# Patient Record
Sex: MALE | Race: WHITE | Employment: OTHER | URBAN - NONMETROPOLITAN AREA
[De-identification: names, ages, dates, MRNs, and addresses within clinical notes are randomized per-mention and may not be internally consistent; named-entity substitution may affect disease eponyms.]

---

## 2017-01-12 ENCOUNTER — OFFICE VISIT (OUTPATIENT)
Dept: UROLOGY | Age: 77
End: 2017-01-12

## 2017-01-12 VITALS
DIASTOLIC BLOOD PRESSURE: 80 MMHG | WEIGHT: 204 LBS | BODY MASS INDEX: 27.04 KG/M2 | HEIGHT: 73 IN | SYSTOLIC BLOOD PRESSURE: 130 MMHG

## 2017-01-12 DIAGNOSIS — N28.1 RENAL CYST, LEFT: ICD-10-CM

## 2017-01-12 DIAGNOSIS — C61 PROSTATE CANCER (HCC): Primary | ICD-10-CM

## 2017-01-12 DIAGNOSIS — R31.29 MICROSCOPIC HEMATURIA: ICD-10-CM

## 2017-01-12 LAB
BILIRUBIN URINE: NEGATIVE
BLOOD URINE, POC: NORMAL
CHARACTER, URINE: CLEAR
COLOR, URINE: YELLOW
GLUCOSE URINE: NEGATIVE MG/DL
KETONES, URINE: NEGATIVE
LEUKOCYTE CLUMPS, URINE: NEGATIVE
NITRITE, URINE: NEGATIVE
PH, URINE: 5.5
PROTEIN, URINE: NEGATIVE MG/DL
SPECIFIC GRAVITY, URINE: <= 1.005 (ref 1–1.03)
UROBILINOGEN, URINE: 0.2 EU/DL

## 2017-01-12 PROCEDURE — G8420 CALC BMI NORM PARAMETERS: HCPCS | Performed by: PHYSICIAN ASSISTANT

## 2017-01-12 PROCEDURE — 4040F PNEUMOC VAC/ADMIN/RCVD: CPT | Performed by: PHYSICIAN ASSISTANT

## 2017-01-12 PROCEDURE — 1123F ACP DISCUSS/DSCN MKR DOCD: CPT | Performed by: PHYSICIAN ASSISTANT

## 2017-01-12 PROCEDURE — 3017F COLORECTAL CA SCREEN DOC REV: CPT | Performed by: PHYSICIAN ASSISTANT

## 2017-01-12 PROCEDURE — 81003 URINALYSIS AUTO W/O SCOPE: CPT | Performed by: PHYSICIAN ASSISTANT

## 2017-01-12 PROCEDURE — G8427 DOCREV CUR MEDS BY ELIG CLIN: HCPCS | Performed by: PHYSICIAN ASSISTANT

## 2017-01-12 PROCEDURE — G8598 ASA/ANTIPLAT THER USED: HCPCS | Performed by: PHYSICIAN ASSISTANT

## 2017-01-12 PROCEDURE — 99213 OFFICE O/P EST LOW 20 MIN: CPT | Performed by: PHYSICIAN ASSISTANT

## 2017-01-12 PROCEDURE — G8484 FLU IMMUNIZE NO ADMIN: HCPCS | Performed by: PHYSICIAN ASSISTANT

## 2017-01-12 PROCEDURE — 1036F TOBACCO NON-USER: CPT | Performed by: PHYSICIAN ASSISTANT

## 2017-04-21 ENCOUNTER — OFFICE VISIT (OUTPATIENT)
Dept: CARDIOLOGY | Age: 77
End: 2017-04-21

## 2017-04-21 VITALS
BODY MASS INDEX: 30.22 KG/M2 | HEART RATE: 62 BPM | WEIGHT: 228 LBS | DIASTOLIC BLOOD PRESSURE: 70 MMHG | HEIGHT: 73 IN | SYSTOLIC BLOOD PRESSURE: 150 MMHG

## 2017-04-21 DIAGNOSIS — I25.10 CORONARY ARTERY DISEASE INVOLVING NATIVE HEART WITHOUT ANGINA PECTORIS, UNSPECIFIED VESSEL OR LESION TYPE: Primary | ICD-10-CM

## 2017-04-21 DIAGNOSIS — I10 ESSENTIAL HYPERTENSION: ICD-10-CM

## 2017-04-21 DIAGNOSIS — E78.01 FAMILIAL HYPERCHOLESTEROLEMIA: ICD-10-CM

## 2017-04-21 PROCEDURE — 1123F ACP DISCUSS/DSCN MKR DOCD: CPT | Performed by: NUCLEAR MEDICINE

## 2017-04-21 PROCEDURE — G8427 DOCREV CUR MEDS BY ELIG CLIN: HCPCS | Performed by: NUCLEAR MEDICINE

## 2017-04-21 PROCEDURE — 4040F PNEUMOC VAC/ADMIN/RCVD: CPT | Performed by: NUCLEAR MEDICINE

## 2017-04-21 PROCEDURE — 99213 OFFICE O/P EST LOW 20 MIN: CPT | Performed by: NUCLEAR MEDICINE

## 2017-04-21 PROCEDURE — G8417 CALC BMI ABV UP PARAM F/U: HCPCS | Performed by: NUCLEAR MEDICINE

## 2017-04-21 PROCEDURE — 1036F TOBACCO NON-USER: CPT | Performed by: NUCLEAR MEDICINE

## 2017-04-21 PROCEDURE — 3017F COLORECTAL CA SCREEN DOC REV: CPT | Performed by: NUCLEAR MEDICINE

## 2017-04-21 PROCEDURE — 93000 ELECTROCARDIOGRAM COMPLETE: CPT | Performed by: NUCLEAR MEDICINE

## 2017-04-21 PROCEDURE — G8598 ASA/ANTIPLAT THER USED: HCPCS | Performed by: NUCLEAR MEDICINE

## 2017-04-21 RX ORDER — ATORVASTATIN CALCIUM 80 MG/1
TABLET, FILM COATED ORAL
Qty: 30 TABLET | Refills: 11 | Status: SHIPPED | OUTPATIENT
Start: 2017-04-21 | End: 2018-04-23 | Stop reason: SDUPTHER

## 2017-05-31 ENCOUNTER — TELEPHONE (OUTPATIENT)
Dept: CARDIOLOGY | Age: 77
End: 2017-05-31

## 2017-06-05 RX ORDER — CLOPIDOGREL BISULFATE 75 MG/1
TABLET ORAL
Qty: 90 TABLET | Refills: 0 | Status: SHIPPED | OUTPATIENT
Start: 2017-06-05 | End: 2017-06-16 | Stop reason: SDUPTHER

## 2017-06-16 ENCOUNTER — OFFICE VISIT (OUTPATIENT)
Dept: FAMILY MEDICINE CLINIC | Age: 77
End: 2017-06-16

## 2017-06-16 VITALS
BODY MASS INDEX: 29.66 KG/M2 | WEIGHT: 224.8 LBS | DIASTOLIC BLOOD PRESSURE: 78 MMHG | SYSTOLIC BLOOD PRESSURE: 116 MMHG | HEART RATE: 68 BPM | TEMPERATURE: 98 F | RESPIRATION RATE: 16 BRPM

## 2017-06-16 DIAGNOSIS — I10 ESSENTIAL HYPERTENSION: Primary | ICD-10-CM

## 2017-06-16 DIAGNOSIS — N52.9 ERECTILE DYSFUNCTION, UNSPECIFIED ERECTILE DYSFUNCTION TYPE: ICD-10-CM

## 2017-06-16 DIAGNOSIS — I25.10 CORONARY ARTERY DISEASE INVOLVING NATIVE HEART WITHOUT ANGINA PECTORIS, UNSPECIFIED VESSEL OR LESION TYPE: ICD-10-CM

## 2017-06-16 DIAGNOSIS — E78.5 HYPERLIPIDEMIA, UNSPECIFIED HYPERLIPIDEMIA TYPE: ICD-10-CM

## 2017-06-16 DIAGNOSIS — H35.30 MACULAR DEGENERATION: ICD-10-CM

## 2017-06-16 DIAGNOSIS — N13.8 BPH WITH OBSTRUCTION/LOWER URINARY TRACT SYMPTOMS: ICD-10-CM

## 2017-06-16 DIAGNOSIS — N40.1 BPH WITH OBSTRUCTION/LOWER URINARY TRACT SYMPTOMS: ICD-10-CM

## 2017-06-16 PROCEDURE — 99214 OFFICE O/P EST MOD 30 MIN: CPT | Performed by: FAMILY MEDICINE

## 2017-06-16 PROCEDURE — G8427 DOCREV CUR MEDS BY ELIG CLIN: HCPCS | Performed by: FAMILY MEDICINE

## 2017-06-16 PROCEDURE — 1036F TOBACCO NON-USER: CPT | Performed by: FAMILY MEDICINE

## 2017-06-16 PROCEDURE — 1123F ACP DISCUSS/DSCN MKR DOCD: CPT | Performed by: FAMILY MEDICINE

## 2017-06-16 PROCEDURE — G8598 ASA/ANTIPLAT THER USED: HCPCS | Performed by: FAMILY MEDICINE

## 2017-06-16 PROCEDURE — G8417 CALC BMI ABV UP PARAM F/U: HCPCS | Performed by: FAMILY MEDICINE

## 2017-06-16 PROCEDURE — 3017F COLORECTAL CA SCREEN DOC REV: CPT | Performed by: FAMILY MEDICINE

## 2017-06-16 PROCEDURE — 4040F PNEUMOC VAC/ADMIN/RCVD: CPT | Performed by: FAMILY MEDICINE

## 2017-06-16 RX ORDER — CLOPIDOGREL BISULFATE 75 MG/1
75 TABLET ORAL DAILY
Qty: 90 TABLET | Refills: 3 | Status: SHIPPED | OUTPATIENT
Start: 2017-06-16 | End: 2018-04-27 | Stop reason: SDUPTHER

## 2017-06-16 ASSESSMENT — ENCOUNTER SYMPTOMS
DIARRHEA: 0
ABDOMINAL PAIN: 0
CONSTIPATION: 0
ANAL BLEEDING: 0
SHORTNESS OF BREATH: 0
NAUSEA: 0
CHEST TIGHTNESS: 0
VOMITING: 0
BLOOD IN STOOL: 0

## 2017-08-04 LAB — PSA, ULTRASENSITIVE: 1.64 NG/ML

## 2017-08-18 ENCOUNTER — TELEPHONE (OUTPATIENT)
Dept: UROLOGY | Age: 77
End: 2017-08-18

## 2017-08-22 DIAGNOSIS — R97.20 ELEVATED PSA: Primary | ICD-10-CM

## 2017-08-23 DIAGNOSIS — R97.20 ELEVATED PSA: Primary | ICD-10-CM

## 2017-08-24 ENCOUNTER — HOSPITAL ENCOUNTER (OUTPATIENT)
Dept: MRI IMAGING | Age: 77
Discharge: HOME | End: 2017-08-24
Payer: MEDICARE

## 2017-08-24 DIAGNOSIS — R97.20 ELEVATED PSA: ICD-10-CM

## 2017-08-24 LAB — POC CREATININE WHOLE BLOOD: 0.7 MG/DL (ref 0.5–1.2)

## 2017-08-24 PROCEDURE — 6360000004 HC RX CONTRAST MEDICATION: Performed by: PHYSICIAN ASSISTANT

## 2017-08-24 PROCEDURE — 82565 ASSAY OF CREATININE: CPT

## 2017-08-24 PROCEDURE — 72197 MRI PELVIS W/O & W/DYE: CPT

## 2017-08-24 PROCEDURE — A9579 GAD-BASE MR CONTRAST NOS,1ML: HCPCS | Performed by: PHYSICIAN ASSISTANT

## 2017-08-24 RX ADMIN — GADOTERIDOL 20 ML: 279.3 INJECTION, SOLUTION INTRAVENOUS at 13:27

## 2017-08-30 ENCOUNTER — TELEPHONE (OUTPATIENT)
Dept: UROLOGY | Age: 77
End: 2017-08-30

## 2017-08-30 NOTE — TELEPHONE ENCOUNTER
Spoke to Mr. Kylee Jose regarding his recent MRI of the pelvis with and without contrast. He has a history of [place] 3+3=6 prostate cancer found on TURP specimen in 2015. His PSA increased from 0.51 (1/17) to 1.64 (8/17), necessitating the MRI. Results as follows:    MRI pelvis    1. There are multiple, heterogeneous and well defined nodules within the transitional and peripheral zones consistent with nodules of benign prostatic hypertrophy. No focal, suspicious lesion is identified. This is consistent with PI-RADS 2: Low    (clinically significant cancer is unlikely to be present).       2. Asymmetry of the seminal vesicles with low intensity T2 signal throughout the right seminal vesicle as well as more homogeneous enhancement on the postcontrast images. No contour deforming mass is identified and this may relate to inspissated material    however, neoplastic process cannot be completely excluded. Further clinical correlation and consideration of repeat exam to document stability would be beneficial     Would like to schedule UroNav Fusion Prostate Biopsy with concentration on the right seminal vesicle with Dr. Jovita Gaucher. Patient is in agreement with the plan. Discussed the possible risks associated with the procedure including bleeding, infection, damage to tissue or adjacent organs. Patient understands risks and wishes to proceed. Mr. Kylee Jose is scheduled to have a colonoscopy on 9/11/17. Will scheduled his prostate biopsy for late September or early October 2017.

## 2017-09-11 ENCOUNTER — PROCEDURE VISIT (OUTPATIENT)
Dept: UROLOGY | Age: 77
End: 2017-09-11
Payer: MEDICARE

## 2017-09-11 VITALS — DIASTOLIC BLOOD PRESSURE: 92 MMHG | SYSTOLIC BLOOD PRESSURE: 172 MMHG

## 2017-09-11 DIAGNOSIS — Z85.46 PERSONAL HISTORY OF PROSTATE CANCER: ICD-10-CM

## 2017-09-11 DIAGNOSIS — R97.20 ELEVATED PSA: Primary | ICD-10-CM

## 2017-09-11 PROCEDURE — 96372 THER/PROPH/DIAG INJ SC/IM: CPT | Performed by: UROLOGY

## 2017-09-11 PROCEDURE — 1036F TOBACCO NON-USER: CPT | Performed by: UROLOGY

## 2017-09-11 PROCEDURE — 76942 ECHO GUIDE FOR BIOPSY: CPT | Performed by: UROLOGY

## 2017-09-11 PROCEDURE — 99999 PR OFFICE/OUTPT VISIT,PROCEDURE ONLY: CPT | Performed by: UROLOGY

## 2017-09-11 PROCEDURE — 76872 US TRANSRECTAL: CPT | Performed by: UROLOGY

## 2017-09-11 PROCEDURE — 55700 PR BIOPSY OF PROSTATE,NEEDLE/PUNCH: CPT | Performed by: UROLOGY

## 2017-09-11 RX ORDER — GENTAMICIN SULFATE 40 MG/ML
80 INJECTION, SOLUTION INTRAMUSCULAR; INTRAVENOUS ONCE
Status: COMPLETED | OUTPATIENT
Start: 2017-09-11 | End: 2017-09-11

## 2017-09-11 RX ADMIN — GENTAMICIN SULFATE 80 MG: 40 INJECTION, SOLUTION INTRAMUSCULAR; INTRAVENOUS at 17:15

## 2017-09-13 ENCOUNTER — TELEPHONE (OUTPATIENT)
Dept: UROLOGY | Age: 77
End: 2017-09-13

## 2017-09-14 ENCOUNTER — TELEPHONE (OUTPATIENT)
Dept: UROLOGY | Age: 77
End: 2017-09-14

## 2017-09-18 ENCOUNTER — TELEPHONE (OUTPATIENT)
Dept: UROLOGY | Age: 77
End: 2017-09-18

## 2018-01-03 ENCOUNTER — HOSPITAL ENCOUNTER (OUTPATIENT)
Dept: ULTRASOUND IMAGING | Age: 78
Discharge: HOME | End: 2018-01-03
Payer: MEDICARE

## 2018-01-03 DIAGNOSIS — R39.15 URGENCY OF URINATION: ICD-10-CM

## 2018-01-03 DIAGNOSIS — N28.1 RENAL CYST, LEFT: ICD-10-CM

## 2018-01-03 PROCEDURE — 76770 US EXAM ABDO BACK WALL COMP: CPT

## 2018-01-08 ENCOUNTER — TELEPHONE (OUTPATIENT)
Dept: UROLOGY | Age: 78
End: 2018-01-08

## 2018-01-09 ENCOUNTER — HOSPITAL ENCOUNTER (OUTPATIENT)
Age: 78
Discharge: HOME | End: 2018-01-09
Payer: MEDICARE

## 2018-01-09 DIAGNOSIS — N13.8 BPH WITH OBSTRUCTION/LOWER URINARY TRACT SYMPTOMS: Primary | ICD-10-CM

## 2018-01-09 DIAGNOSIS — N13.8 BPH WITH OBSTRUCTION/LOWER URINARY TRACT SYMPTOMS: ICD-10-CM

## 2018-01-09 DIAGNOSIS — N40.1 BPH WITH OBSTRUCTION/LOWER URINARY TRACT SYMPTOMS: Primary | ICD-10-CM

## 2018-01-09 DIAGNOSIS — N40.1 BPH WITH OBSTRUCTION/LOWER URINARY TRACT SYMPTOMS: ICD-10-CM

## 2018-01-09 LAB — PROSTATE SPECIFIC ANTIGEN: 0.44 NG/ML (ref 0–1)

## 2018-01-09 PROCEDURE — 84153 ASSAY OF PSA TOTAL: CPT

## 2018-01-09 PROCEDURE — 36415 COLL VENOUS BLD VENIPUNCTURE: CPT

## 2018-02-16 RX ORDER — CLOPIDOGREL BISULFATE 75 MG/1
TABLET ORAL
Qty: 90 TABLET | Refills: 0 | Status: SHIPPED | OUTPATIENT
Start: 2018-02-16 | End: 2018-03-01 | Stop reason: SDUPTHER

## 2018-03-01 ENCOUNTER — TELEPHONE (OUTPATIENT)
Dept: UROLOGY | Age: 78
End: 2018-03-01

## 2018-03-01 ENCOUNTER — OFFICE VISIT (OUTPATIENT)
Dept: UROLOGY | Age: 78
End: 2018-03-01
Payer: MEDICARE

## 2018-03-01 VITALS
BODY MASS INDEX: 29.69 KG/M2 | SYSTOLIC BLOOD PRESSURE: 136 MMHG | WEIGHT: 224 LBS | HEIGHT: 73 IN | DIASTOLIC BLOOD PRESSURE: 72 MMHG

## 2018-03-01 DIAGNOSIS — C61 PROSTATE CANCER (HCC): ICD-10-CM

## 2018-03-01 DIAGNOSIS — N28.1 RENAL CYST: ICD-10-CM

## 2018-03-01 DIAGNOSIS — N40.1 BPH WITH OBSTRUCTION/LOWER URINARY TRACT SYMPTOMS: Primary | ICD-10-CM

## 2018-03-01 DIAGNOSIS — R31.29 MICROSCOPIC HEMATURIA: ICD-10-CM

## 2018-03-01 DIAGNOSIS — N13.8 BPH WITH OBSTRUCTION/LOWER URINARY TRACT SYMPTOMS: Primary | ICD-10-CM

## 2018-03-01 LAB
BILIRUBIN URINE: NEGATIVE
BILIRUBIN URINE: NEGATIVE
BLOOD URINE, POC: NORMAL
BLOOD, URINE: NEGATIVE
CHARACTER, URINE: CLEAR
CHARACTER, URINE: CLEAR
COLOR, URINE: YELLOW
COLOR: YELLOW
GLUCOSE URINE: NEGATIVE MG/DL
GLUCOSE URINE: NEGATIVE MG/DL
KETONES, URINE: NEGATIVE
KETONES, URINE: NEGATIVE
LEUKOCYTE CLUMPS, URINE: NEGATIVE
LEUKOCYTE ESTERASE, URINE: NEGATIVE
NITRITE, URINE: NEGATIVE
NITRITE, URINE: NEGATIVE
PH UA: 5.5
PH, URINE: 5.5
PROTEIN UA: NEGATIVE
PROTEIN, URINE: NEGATIVE MG/DL
SPECIFIC GRAVITY, URINE: 1.01 (ref 1–1.03)
SPECIFIC GRAVITY, URINE: <= 1.005 (ref 1–1.03)
UROBILINOGEN, URINE: 0.2 EU/DL
UROBILINOGEN, URINE: 0.2 EU/DL

## 2018-03-01 PROCEDURE — G8419 CALC BMI OUT NRM PARAM NOF/U: HCPCS | Performed by: PHYSICIAN ASSISTANT

## 2018-03-01 PROCEDURE — G8427 DOCREV CUR MEDS BY ELIG CLIN: HCPCS | Performed by: PHYSICIAN ASSISTANT

## 2018-03-01 PROCEDURE — G8484 FLU IMMUNIZE NO ADMIN: HCPCS | Performed by: PHYSICIAN ASSISTANT

## 2018-03-01 PROCEDURE — 1036F TOBACCO NON-USER: CPT | Performed by: PHYSICIAN ASSISTANT

## 2018-03-01 PROCEDURE — G8598 ASA/ANTIPLAT THER USED: HCPCS | Performed by: PHYSICIAN ASSISTANT

## 2018-03-01 PROCEDURE — 3017F COLORECTAL CA SCREEN DOC REV: CPT | Performed by: PHYSICIAN ASSISTANT

## 2018-03-01 PROCEDURE — 81003 URINALYSIS AUTO W/O SCOPE: CPT | Performed by: PHYSICIAN ASSISTANT

## 2018-03-01 PROCEDURE — 1123F ACP DISCUSS/DSCN MKR DOCD: CPT | Performed by: PHYSICIAN ASSISTANT

## 2018-03-01 PROCEDURE — 4040F PNEUMOC VAC/ADMIN/RCVD: CPT | Performed by: PHYSICIAN ASSISTANT

## 2018-03-01 PROCEDURE — 99213 OFFICE O/P EST LOW 20 MIN: CPT | Performed by: PHYSICIAN ASSISTANT

## 2018-03-01 RX ORDER — SILDENAFIL CITRATE 20 MG/1
TABLET ORAL
Qty: 30 TABLET | Refills: 11 | Status: SHIPPED | OUTPATIENT
Start: 2018-03-01 | End: 2019-03-07 | Stop reason: SDUPTHER

## 2018-03-01 NOTE — TELEPHONE ENCOUNTER
Gladys Napoles is scheduled for renal uts at [facility] radiology 2-25-19 at 9 am, being there at 830 am.  He has order and instructions for preparation, in hand.

## 2018-03-01 NOTE — PROGRESS NOTES
pH, Urine 5.50 5.0 - 9.0    Protein, Urine Negative NEGATIVE mg/dl    Urobilinogen, Urine 0.20 0.0 - 1.0 eu/dl    Nitrite, Urine Negative NEGATIVE    Leukocyte Clumps, Urine Negative NEGATIVE    Color, Urine Yellow YELLOW-STR    Character, Urine Clear [id number]       Lab Results   Component Value Date    CREATININE 0.6 11/23/2016    BUN 14 11/23/2016     11/23/2016    K 4.6 11/23/2016     11/23/2016    CO2 27 11/23/2016       Lab Results   Component Value Date    PSA 0.44 01/09/2018    PSA 0.51 01/06/2017    PSA 0.48 07/13/2016     Radiology:    FINDINGS: The right kidney measures 12.7 x 5.8 x 6.9 cm and the left kidney measures 13.7 x 6.4 x 8.2 cm. Renal cortical thickness is normal bilaterally. Given differences in technique, an avascular anechoic structure at the superior left kidney is    stable in size, now measuring approximately 1.2 cm. There is no hydronephrosis or renal calculi on either side.       Color Doppler demonstrates expected wave forms in the bilateral renal arteries. Arcuate resistive indices are normal bilaterally.       The distended urinary bladder is unremarkable. There is a small amount of postvoid residual urine in the bladder.           Impression   1. Stable small left renal cyst.   2. Small amount of postvoid residual urine but otherwise unremarkable urinary bladder. Plan:    1. Prostate cancer- [place] 3+3=6 disease. Under active surveillance. TRUS prostate biopsy in 9/17 significant for [place] 3+3=6 in left mid and left base. PSA has decreased from 1.64 to 0.44. Will continue to monitor PSA levels every 6 months. Will call patient with next PSA result. Follow-up in one year.     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 His has mild to moderate irritative/obstructive symptoms but the patient does not desire treatment at this time. Discussed medical and surgical options. Patient was instructed to call immediately if his irritative or obstructive symptoms worsen.

## 2018-03-06 ENCOUNTER — TELEPHONE (OUTPATIENT)
Dept: UROLOGY | Age: 78
End: 2018-03-06

## 2018-03-06 NOTE — TELEPHONE ENCOUNTER
Completed request from cover my meds for sildenafil citrate 20mg. Response from website:  \"Your information has been submitted to [facility] Medicare Part D. [facility] Medicare Part D will review the request and will issue a decision, typically within 1-3 days from your submission. You can check the updated outcome later by reopening this request.    If [facility] Medicare Part D has not responded in 1-3 days or if you have any questions about your ePA request, please contact [facility] Medicare Part D at [phone]. If you think there may be a problem with your PA request, use our live chat feature at the bottom right. \"

## 2018-04-21 ENCOUNTER — HOSPITAL ENCOUNTER (OUTPATIENT)
Age: 78
Discharge: HOME | End: 2018-04-21
Payer: MEDICARE

## 2018-04-21 DIAGNOSIS — I10 ESSENTIAL HYPERTENSION: ICD-10-CM

## 2018-04-21 DIAGNOSIS — E78.5 HYPERLIPIDEMIA, UNSPECIFIED HYPERLIPIDEMIA TYPE: ICD-10-CM

## 2018-04-21 LAB
ALBUMIN SERPL-MCNC: 3.9 G/DL (ref 3.5–5.1)
ALP BLD-CCNC: 87 U/L (ref 38–126)
ALT SERPL-CCNC: 23 U/L (ref 11–66)
ANION GAP SERPL CALCULATED.3IONS-SCNC: 7 MEQ/L (ref 8–16)
AST SERPL-CCNC: 23 U/L (ref 5–40)
BILIRUB SERPL-MCNC: 1 MG/DL (ref 0.3–1.2)
BUN BLDV-MCNC: 12 MG/DL (ref 7–22)
CALCIUM SERPL-MCNC: 9.5 MG/DL (ref 8.5–10.5)
CHLORIDE BLD-SCNC: 103 MEQ/L (ref 98–111)
CHOLESTEROL, TOTAL: 202 MG/DL (ref 100–199)
CO2: 29 MEQ/L (ref 23–33)
CREAT SERPL-MCNC: 0.7 MG/DL (ref 0.4–1.2)
GFR SERPL CREATININE-BSD FRML MDRD: > 90 ML/MIN/1.73M2
GLUCOSE BLD-MCNC: 102 MG/DL (ref 70–108)
HDLC SERPL-MCNC: 33 MG/DL
LDL CHOLESTEROL CALCULATED: 103 MG/DL
POTASSIUM SERPL-SCNC: 4.2 MEQ/L (ref 3.5–5.2)
SODIUM BLD-SCNC: 139 MEQ/L (ref 135–145)
TOTAL PROTEIN: 7.3 G/DL (ref 6.1–8)
TRIGL SERPL-MCNC: 331 MG/DL (ref 0–199)

## 2018-04-21 PROCEDURE — 80053 COMPREHEN METABOLIC PANEL: CPT

## 2018-04-21 PROCEDURE — 36415 COLL VENOUS BLD VENIPUNCTURE: CPT

## 2018-04-21 PROCEDURE — 80061 LIPID PANEL: CPT

## 2018-04-23 ENCOUNTER — TELEPHONE (OUTPATIENT)
Dept: FAMILY MEDICINE CLINIC | Age: 78
End: 2018-04-23

## 2018-04-23 DIAGNOSIS — I10 ESSENTIAL HYPERTENSION: ICD-10-CM

## 2018-04-23 DIAGNOSIS — E78.5 HYPERLIPIDEMIA, UNSPECIFIED HYPERLIPIDEMIA TYPE: Primary | ICD-10-CM

## 2018-04-23 RX ORDER — LOSARTAN POTASSIUM 100 MG/1
TABLET ORAL
Qty: 90 TABLET | Refills: 3 | Status: SHIPPED | OUTPATIENT
Start: 2018-04-23 | End: 2019-09-23 | Stop reason: SDUPTHER

## 2018-04-23 RX ORDER — ATORVASTATIN CALCIUM 80 MG/1
TABLET, FILM COATED ORAL
Qty: 30 TABLET | Refills: 1 | Status: SHIPPED | OUTPATIENT
Start: 2018-04-23 | End: 2018-06-07 | Stop reason: SDUPTHER

## 2018-04-23 RX ORDER — AMLODIPINE BESYLATE 2.5 MG/1
TABLET ORAL
Qty: 90 TABLET | Refills: 3 | Status: SHIPPED | OUTPATIENT
Start: 2018-04-23 | End: 2018-04-27 | Stop reason: DRUGHIGH

## 2018-04-27 ENCOUNTER — OFFICE VISIT (OUTPATIENT)
Dept: CARDIOLOGY CLINIC | Age: 78
End: 2018-04-27
Payer: MEDICARE

## 2018-04-27 ENCOUNTER — TELEPHONE (OUTPATIENT)
Dept: CARDIOLOGY CLINIC | Age: 78
End: 2018-04-27

## 2018-04-27 VITALS
DIASTOLIC BLOOD PRESSURE: 94 MMHG | HEIGHT: 73 IN | WEIGHT: 232 LBS | HEART RATE: 68 BPM | SYSTOLIC BLOOD PRESSURE: 154 MMHG | BODY MASS INDEX: 30.75 KG/M2

## 2018-04-27 DIAGNOSIS — I25.10 CORONARY ARTERY DISEASE INVOLVING NATIVE HEART WITHOUT ANGINA PECTORIS, UNSPECIFIED VESSEL OR LESION TYPE: Primary | ICD-10-CM

## 2018-04-27 DIAGNOSIS — I10 ESSENTIAL HYPERTENSION: ICD-10-CM

## 2018-04-27 DIAGNOSIS — Z13.6 SCREENING FOR AAA (ABDOMINAL AORTIC ANEURYSM): ICD-10-CM

## 2018-04-27 DIAGNOSIS — E78.01 FAMILIAL HYPERCHOLESTEROLEMIA: ICD-10-CM

## 2018-04-27 PROCEDURE — G8417 CALC BMI ABV UP PARAM F/U: HCPCS | Performed by: NUCLEAR MEDICINE

## 2018-04-27 PROCEDURE — 4040F PNEUMOC VAC/ADMIN/RCVD: CPT | Performed by: NUCLEAR MEDICINE

## 2018-04-27 PROCEDURE — 1036F TOBACCO NON-USER: CPT | Performed by: NUCLEAR MEDICINE

## 2018-04-27 PROCEDURE — 93000 ELECTROCARDIOGRAM COMPLETE: CPT | Performed by: NUCLEAR MEDICINE

## 2018-04-27 PROCEDURE — 99213 OFFICE O/P EST LOW 20 MIN: CPT | Performed by: NUCLEAR MEDICINE

## 2018-04-27 PROCEDURE — 3017F COLORECTAL CA SCREEN DOC REV: CPT | Performed by: NUCLEAR MEDICINE

## 2018-04-27 PROCEDURE — G8427 DOCREV CUR MEDS BY ELIG CLIN: HCPCS | Performed by: NUCLEAR MEDICINE

## 2018-04-27 PROCEDURE — 1123F ACP DISCUSS/DSCN MKR DOCD: CPT | Performed by: NUCLEAR MEDICINE

## 2018-04-27 PROCEDURE — G8598 ASA/ANTIPLAT THER USED: HCPCS | Performed by: NUCLEAR MEDICINE

## 2018-04-27 RX ORDER — CLOPIDOGREL BISULFATE 75 MG/1
75 TABLET ORAL DAILY
Qty: 90 TABLET | Refills: 3 | Status: SHIPPED | OUTPATIENT
Start: 2018-04-27 | End: 2019-04-23 | Stop reason: SDUPTHER

## 2018-04-27 RX ORDER — AMLODIPINE BESYLATE 5 MG/1
5 TABLET ORAL DAILY
COMMUNITY
End: 2018-04-27 | Stop reason: SDUPTHER

## 2018-04-27 RX ORDER — AMLODIPINE BESYLATE 5 MG/1
5 TABLET ORAL DAILY
Qty: 90 TABLET | Refills: 3 | Status: SHIPPED | OUTPATIENT
Start: 2018-04-27 | End: 2019-09-23 | Stop reason: SDUPTHER

## 2018-04-27 RX ORDER — CHLORAL HYDRATE 500 MG
2000 CAPSULE ORAL DAILY
COMMUNITY

## 2018-05-08 ENCOUNTER — HOSPITAL ENCOUNTER (OUTPATIENT)
Dept: ULTRASOUND IMAGING | Age: 78
Discharge: HOME | End: 2018-05-08
Payer: MEDICARE

## 2018-05-08 DIAGNOSIS — Z13.6 SCREENING FOR AAA (ABDOMINAL AORTIC ANEURYSM): ICD-10-CM

## 2018-05-08 DIAGNOSIS — I10 ESSENTIAL HYPERTENSION: ICD-10-CM

## 2018-05-08 DIAGNOSIS — I25.10 CORONARY ARTERY DISEASE INVOLVING NATIVE HEART WITHOUT ANGINA PECTORIS, UNSPECIFIED VESSEL OR LESION TYPE: ICD-10-CM

## 2018-05-08 DIAGNOSIS — E78.01 FAMILIAL HYPERCHOLESTEROLEMIA: ICD-10-CM

## 2018-05-08 PROCEDURE — 76706 US ABDL AORTA SCREEN AAA: CPT

## 2018-05-09 ENCOUNTER — TELEPHONE (OUTPATIENT)
Dept: CARDIOLOGY CLINIC | Age: 78
End: 2018-05-09

## 2018-05-31 ENCOUNTER — OFFICE VISIT (OUTPATIENT)
Dept: CARDIOLOGY CLINIC | Age: 78
End: 2018-05-31
Payer: MEDICARE

## 2018-05-31 VITALS
HEIGHT: 73 IN | DIASTOLIC BLOOD PRESSURE: 90 MMHG | HEART RATE: 72 BPM | SYSTOLIC BLOOD PRESSURE: 164 MMHG | WEIGHT: 227 LBS | BODY MASS INDEX: 30.09 KG/M2

## 2018-05-31 DIAGNOSIS — I25.10 CORONARY ARTERY DISEASE INVOLVING NATIVE CORONARY ARTERY OF NATIVE HEART WITHOUT ANGINA PECTORIS: ICD-10-CM

## 2018-05-31 DIAGNOSIS — E78.01 FAMILIAL HYPERCHOLESTEROLEMIA: ICD-10-CM

## 2018-05-31 DIAGNOSIS — I10 ESSENTIAL HYPERTENSION: Primary | ICD-10-CM

## 2018-05-31 DIAGNOSIS — Z01.818 PRE-OP EVALUATION: ICD-10-CM

## 2018-05-31 PROCEDURE — G8427 DOCREV CUR MEDS BY ELIG CLIN: HCPCS | Performed by: NUCLEAR MEDICINE

## 2018-05-31 PROCEDURE — G8417 CALC BMI ABV UP PARAM F/U: HCPCS | Performed by: NUCLEAR MEDICINE

## 2018-05-31 PROCEDURE — 4040F PNEUMOC VAC/ADMIN/RCVD: CPT | Performed by: NUCLEAR MEDICINE

## 2018-05-31 PROCEDURE — G8598 ASA/ANTIPLAT THER USED: HCPCS | Performed by: NUCLEAR MEDICINE

## 2018-05-31 PROCEDURE — 1123F ACP DISCUSS/DSCN MKR DOCD: CPT | Performed by: NUCLEAR MEDICINE

## 2018-05-31 PROCEDURE — 1036F TOBACCO NON-USER: CPT | Performed by: NUCLEAR MEDICINE

## 2018-05-31 PROCEDURE — 3017F COLORECTAL CA SCREEN DOC REV: CPT | Performed by: NUCLEAR MEDICINE

## 2018-05-31 PROCEDURE — 99214 OFFICE O/P EST MOD 30 MIN: CPT | Performed by: NUCLEAR MEDICINE

## 2018-06-05 LAB
ALT SERPL-CCNC: 26 U/L (ref 5–50)
AST SERPL-CCNC: 29 U/L (ref 9–50)
CHOLESTEROL/HDL RATIO: 5.1
CHOLESTEROL: 172 MG/DL
HDLC SERPL-MCNC: 34 MG/DL
LDL CHOLESTEROL CALCULATED: 95 MG/DL
LDL/HDL RATIO: 2.8
TRIGL SERPL-MCNC: 217 MG/DL
VLDLC SERPL CALC-MCNC: 43 MG/DL

## 2018-06-06 ENCOUNTER — HOSPITAL ENCOUNTER (OUTPATIENT)
Dept: NON INVASIVE DIAGNOSTICS | Age: 78
Discharge: HOME | End: 2018-06-06
Payer: MEDICARE

## 2018-06-06 VITALS — WEIGHT: 215 LBS | BODY MASS INDEX: 28.49 KG/M2 | HEIGHT: 73 IN

## 2018-06-06 DIAGNOSIS — E78.01 FAMILIAL HYPERCHOLESTEROLEMIA: ICD-10-CM

## 2018-06-06 DIAGNOSIS — I10 ESSENTIAL HYPERTENSION: ICD-10-CM

## 2018-06-06 DIAGNOSIS — Z01.818 PRE-OP EVALUATION: ICD-10-CM

## 2018-06-06 DIAGNOSIS — I25.10 CORONARY ARTERY DISEASE INVOLVING NATIVE CORONARY ARTERY OF NATIVE HEART WITHOUT ANGINA PECTORIS: ICD-10-CM

## 2018-06-06 LAB
LV EF: 53 %
LVEF MODALITY: NORMAL

## 2018-06-06 PROCEDURE — 3430000000 HC RX DIAGNOSTIC RADIOPHARMACEUTICAL: Performed by: NUCLEAR MEDICINE

## 2018-06-06 PROCEDURE — 93017 CV STRESS TEST TRACING ONLY: CPT

## 2018-06-06 PROCEDURE — A9500 TC99M SESTAMIBI: HCPCS | Performed by: NUCLEAR MEDICINE

## 2018-06-06 PROCEDURE — 78452 HT MUSCLE IMAGE SPECT MULT: CPT

## 2018-06-06 RX ADMIN — Medication 31.8 MILLICURIE: at 08:35

## 2018-06-06 RX ADMIN — Medication 10.1 MILLICURIE: at 07:28

## 2018-06-07 ENCOUNTER — TELEPHONE (OUTPATIENT)
Dept: CARDIOLOGY CLINIC | Age: 78
End: 2018-06-07

## 2018-06-07 ENCOUNTER — TELEPHONE (OUTPATIENT)
Dept: FAMILY MEDICINE CLINIC | Age: 78
End: 2018-06-07

## 2018-06-07 DIAGNOSIS — E78.5 HYPERLIPIDEMIA, UNSPECIFIED HYPERLIPIDEMIA TYPE: ICD-10-CM

## 2018-06-07 RX ORDER — ATORVASTATIN CALCIUM 80 MG/1
TABLET, FILM COATED ORAL
Qty: 90 TABLET | Refills: 3 | Status: SHIPPED | OUTPATIENT
Start: 2018-06-07 | End: 2019-08-27 | Stop reason: SDUPTHER

## 2018-06-12 ENCOUNTER — OFFICE VISIT (OUTPATIENT)
Dept: FAMILY MEDICINE CLINIC | Age: 78
End: 2018-06-12
Payer: MEDICARE

## 2018-06-12 VITALS
WEIGHT: 228 LBS | BODY MASS INDEX: 30.08 KG/M2 | SYSTOLIC BLOOD PRESSURE: 148 MMHG | DIASTOLIC BLOOD PRESSURE: 94 MMHG | HEART RATE: 60 BPM | TEMPERATURE: 98 F | RESPIRATION RATE: 20 BRPM

## 2018-06-12 DIAGNOSIS — N40.1 BPH WITH OBSTRUCTION/LOWER URINARY TRACT SYMPTOMS: ICD-10-CM

## 2018-06-12 DIAGNOSIS — I10 ESSENTIAL HYPERTENSION: ICD-10-CM

## 2018-06-12 DIAGNOSIS — E78.5 HYPERLIPIDEMIA, UNSPECIFIED HYPERLIPIDEMIA TYPE: ICD-10-CM

## 2018-06-12 DIAGNOSIS — N13.8 BPH WITH OBSTRUCTION/LOWER URINARY TRACT SYMPTOMS: ICD-10-CM

## 2018-06-12 DIAGNOSIS — I25.10 CORONARY ARTERY DISEASE INVOLVING NATIVE HEART WITHOUT ANGINA PECTORIS, UNSPECIFIED VESSEL OR LESION TYPE: ICD-10-CM

## 2018-06-12 DIAGNOSIS — R73.01 IFG (IMPAIRED FASTING GLUCOSE): Primary | ICD-10-CM

## 2018-06-12 PROCEDURE — G8417 CALC BMI ABV UP PARAM F/U: HCPCS | Performed by: FAMILY MEDICINE

## 2018-06-12 PROCEDURE — 99214 OFFICE O/P EST MOD 30 MIN: CPT | Performed by: FAMILY MEDICINE

## 2018-06-12 PROCEDURE — 4040F PNEUMOC VAC/ADMIN/RCVD: CPT | Performed by: FAMILY MEDICINE

## 2018-06-12 PROCEDURE — 3017F COLORECTAL CA SCREEN DOC REV: CPT | Performed by: FAMILY MEDICINE

## 2018-06-12 PROCEDURE — G8427 DOCREV CUR MEDS BY ELIG CLIN: HCPCS | Performed by: FAMILY MEDICINE

## 2018-06-12 PROCEDURE — G8598 ASA/ANTIPLAT THER USED: HCPCS | Performed by: FAMILY MEDICINE

## 2018-06-12 PROCEDURE — 1123F ACP DISCUSS/DSCN MKR DOCD: CPT | Performed by: FAMILY MEDICINE

## 2018-06-12 PROCEDURE — 1036F TOBACCO NON-USER: CPT | Performed by: FAMILY MEDICINE

## 2018-06-12 ASSESSMENT — ENCOUNTER SYMPTOMS
ABDOMINAL PAIN: 0
DIARRHEA: 0
VOMITING: 0
ANAL BLEEDING: 0
BLOOD IN STOOL: 0
NAUSEA: 0
CHEST TIGHTNESS: 0
SHORTNESS OF BREATH: 0
CONSTIPATION: 0

## 2018-06-12 ASSESSMENT — PATIENT HEALTH QUESTIONNAIRE - PHQ9
SUM OF ALL RESPONSES TO PHQ QUESTIONS 1-9: 0
1. LITTLE INTEREST OR PLEASURE IN DOING THINGS: 0
SUM OF ALL RESPONSES TO PHQ9 QUESTIONS 1 & 2: 0
2. FEELING DOWN, DEPRESSED OR HOPELESS: 0

## 2018-12-04 LAB
AVERAGE GLUCOSE: 114 MG/DL (ref 66–114)
HBA1C MFR BLD: 5.6 %
HDLC SERPL-MCNC: 30.6 MG/DL
LDL CHOLESTEROL DIRECT: 102 MG/DL
PSA, ULTRASENSITIVE: 0.68 NG/ML
TRIGL SERPL-MCNC: 291 MG/DL

## 2018-12-05 ENCOUNTER — TELEPHONE (OUTPATIENT)
Dept: UROLOGY | Age: 78
End: 2018-12-05

## 2018-12-10 ENCOUNTER — OFFICE VISIT (OUTPATIENT)
Dept: FAMILY MEDICINE CLINIC | Age: 78
End: 2018-12-10
Payer: MEDICARE

## 2018-12-10 VITALS
DIASTOLIC BLOOD PRESSURE: 80 MMHG | HEART RATE: 68 BPM | WEIGHT: 231 LBS | SYSTOLIC BLOOD PRESSURE: 136 MMHG | BODY MASS INDEX: 30.48 KG/M2 | TEMPERATURE: 98.1 F | RESPIRATION RATE: 16 BRPM

## 2018-12-10 DIAGNOSIS — N40.1 BPH WITH OBSTRUCTION/LOWER URINARY TRACT SYMPTOMS: ICD-10-CM

## 2018-12-10 DIAGNOSIS — F43.21 ADJUSTMENT DISORDER WITH DEPRESSED MOOD: ICD-10-CM

## 2018-12-10 DIAGNOSIS — H35.30 MACULAR DEGENERATION, UNSPECIFIED LATERALITY, UNSPECIFIED TYPE: ICD-10-CM

## 2018-12-10 DIAGNOSIS — R73.01 IFG (IMPAIRED FASTING GLUCOSE): ICD-10-CM

## 2018-12-10 DIAGNOSIS — N13.8 BPH WITH OBSTRUCTION/LOWER URINARY TRACT SYMPTOMS: ICD-10-CM

## 2018-12-10 DIAGNOSIS — R39.15 URINARY URGENCY: ICD-10-CM

## 2018-12-10 DIAGNOSIS — I10 ESSENTIAL HYPERTENSION: Primary | ICD-10-CM

## 2018-12-10 DIAGNOSIS — I25.10 CORONARY ARTERY DISEASE INVOLVING NATIVE HEART WITHOUT ANGINA PECTORIS, UNSPECIFIED VESSEL OR LESION TYPE: ICD-10-CM

## 2018-12-10 DIAGNOSIS — E78.5 HYPERLIPIDEMIA, UNSPECIFIED HYPERLIPIDEMIA TYPE: ICD-10-CM

## 2018-12-10 PROCEDURE — 99214 OFFICE O/P EST MOD 30 MIN: CPT | Performed by: FAMILY MEDICINE

## 2018-12-10 PROCEDURE — 1036F TOBACCO NON-USER: CPT | Performed by: FAMILY MEDICINE

## 2018-12-10 PROCEDURE — G8598 ASA/ANTIPLAT THER USED: HCPCS | Performed by: FAMILY MEDICINE

## 2018-12-10 PROCEDURE — G8427 DOCREV CUR MEDS BY ELIG CLIN: HCPCS | Performed by: FAMILY MEDICINE

## 2018-12-10 PROCEDURE — G8417 CALC BMI ABV UP PARAM F/U: HCPCS | Performed by: FAMILY MEDICINE

## 2018-12-10 PROCEDURE — G8484 FLU IMMUNIZE NO ADMIN: HCPCS | Performed by: FAMILY MEDICINE

## 2018-12-10 PROCEDURE — 4040F PNEUMOC VAC/ADMIN/RCVD: CPT | Performed by: FAMILY MEDICINE

## 2018-12-10 PROCEDURE — 1101F PT FALLS ASSESS-DOCD LE1/YR: CPT | Performed by: FAMILY MEDICINE

## 2018-12-10 PROCEDURE — 1123F ACP DISCUSS/DSCN MKR DOCD: CPT | Performed by: FAMILY MEDICINE

## 2018-12-10 RX ORDER — BUPROPION HYDROCHLORIDE 150 MG/1
150 TABLET ORAL EVERY MORNING
Qty: 30 TABLET | Refills: 1 | Status: SHIPPED | OUTPATIENT
Start: 2018-12-10 | End: 2019-01-15 | Stop reason: SDUPTHER

## 2018-12-10 ASSESSMENT — ENCOUNTER SYMPTOMS
ABDOMINAL PAIN: 0
CHEST TIGHTNESS: 0
SHORTNESS OF BREATH: 0
CONSTIPATION: 0
DIARRHEA: 0
ANAL BLEEDING: 0
NAUSEA: 0
BLOOD IN STOOL: 0
VOMITING: 0

## 2018-12-10 NOTE — PATIENT INSTRUCTIONS
Encouraged NEW SHINGLES SHOT [facility]) - to do at 12 PM today and then again in 2-6 months. COLONOSCOPY done 10/11/2017 per Dr. Penny Oquendo- to do again in 2020. (updated 12/10/2018)  Dr. Edgar Orellana/ Tania Anne managing JOSEFA and PSA- to follow up 3/7/2019  After discussion with pt, will start Wellbutrin  mg- 1 pill daily. #30/1 refill. Call update in 2-3 weeks   After discussion with pt, will hold on changing max dose Lipitor and instead, try to take medicine more regularly and start exercise regimen at this time. Continue current medicines   Check HGA1C, FLP, CMP, TSH/ FREE T4 after 6/4/2019  No refills needed today.     Follow up in 6 weeks.

## 2018-12-10 NOTE — PROGRESS NOTES
oriented to person, place, and time. He appears well-developed and well-nourished. HENT:   Head: Normocephalic and atraumatic. Right Ear: External ear normal.   Left Ear: External ear normal.   Nose: Nose normal.   Mouth/Throat: Oropharynx is clear and moist.   Eyes: Pupils are equal, round, and reactive to light. Conjunctivae and EOM are normal.   Neck: Normal range of motion. Neck supple. Cardiovascular: Normal rate, regular rhythm and intact distal pulses. Pulmonary/Chest: Effort normal and breath sounds normal.   Abdominal: Soft. Bowel sounds are normal.   Musculoskeletal: Normal range of motion. Neurological: He is alert and oriented to person, place, and time. He has normal reflexes. Skin: Skin is warm and dry. Psychiatric: He has a normal mood and affect. His behavior is normal. Judgment and thought content normal.   Nursing note and vitals reviewed.     Component      Latest Ref Rng & Units 12/3/2018 6/4/2018 4/21/2018   Glucose      70 - 108 mg/dL   102   Creatinine      0.4 - 1.2 mg/dL   0.7   BUN      7 - 22 mg/dL   12   Sodium      135 - 145 meq/L   139   Potassium      3.5 - 5.2 meq/L   4.2   Chloride      98 - 111 meq/L   103   CO2      23 - 33 meq/L   29   Calcium      8.5 - 10.5 mg/dL   9.5   AST      9 - 50 U/L  29 23   Alk Phos      38 - 126 U/L   87   Total Protein      6.1 - 8.0 g/dL   7.3   Albumin      3.5 - 5.1 g/dL   3.9   Bilirubin      0.3 - 1.2 mg/dL   1.0   ALT      5 - 50 U/L  26 23   Cholesterol      <200 mg/dL  172    Triglycerides      <150 mg/dL 291 (H) 217 (H) 331 (H)   HDL Cholesterol      >39 mg/dL 30.6 (L) 34 (L) 33   LDL Calculated      <130 mg/dL  95 103   VLDL Cholesterol Calculated      <30 mg/dL  43 (H)    LDl/HDL Ratio      <3.5  2.8    Chol/HDL Ratio      <5  5.1 (H)    Cholesterol, Total      100 - 199 mg/dL   202 (H)   Hemoglobin A1C      <5.5 % 5.6 (H)     AVERAGE GLUCOSE      66 - 114 mg/dL 114     Est, Glom Filt Rate      ml/min/1.73m2   >90   Anion Gap      8.0 - 16.0 meq/L   7.0 (L)   LDL Direct      <100 mg/dL 102 (H)     PSA, Ultrasensitive      <=4.00 ng/mL 0.683         The 10-year ASCVD risk score (Marlen Lawler et al., 2013) is: 37.5%    Values used to calculate the score:      Age: 70 years      Sex: Male      Is Non- : No      Diabetic: No      Tobacco smoker: No      Systolic Blood Pressure: 431 mmHg      Is BP treated: Yes      HDL Cholesterol: 30.6 mg/dL      Total Cholesterol: 172 mg/dL      Lab Results   Component Value Date    WBC 6.3 09/02/2015    HGB 14.2 09/02/2015    HCT 41.9 (L) 09/02/2015    MCV 98.2 (H) 09/02/2015     09/02/2015       Component      Latest Ref Rng & Units 12/3/2018 1/9/2018 8/3/2017 1/6/2017           8:21 AM  2:00 PM  8:15 AM  1:23 PM   Prostatic Specific Ag      0.00 - 1.00 ng/mL  0.44  0.51   PSA, Ultrasensitive      <=4.00 ng/mL 0.683  1.64        Component      Latest Ref Rng & Units 7/13/2016 7/30/2015 1/22/2013 5/27/2011           4:39 PM  8:37 AM  7:01 AM 12:00 AM   Prostatic Specific Ag      0.00 - 1.00 ng/mL 0.48 1.88 1.41 1.44   PSA, Ultrasensitive      <=4.00 ng/mL           Component      Latest Ref Rng & Units 5/18/2010          12:00 AM   Prostatic Specific Ag      0.00 - 1.00 ng/mL 1.32   PSA, Ultrasensitive      <=4.00 ng/mL        Immunization History   Administered Date(s) Administered    Influenza Virus Vaccine 10/01/2009, 11/08/2012, 11/12/2013, 12/05/2014, 11/10/2015, 12/03/2018    Influenza, Rose [place], 3 Years and older, IM (Fluzone 3 yrs and older or Afluria 5 yrs and older) 12/08/2016    Pneumococcal 13-valent Conjugate ([id number]) 11/10/2015    Pneumococcal Polysaccharide ([id number]) 11/15/2007    Tdap (Boostrix, Adacel) 05/01/2007, 06/15/2017    Zoster Live (Zostavax) 12/03/2009       Health Maintenance   Topic Date Due    Shingles Vaccine (1 of 2 - 2 Dose Series) 02/03/2010    Potassium monitoring  04/21/2019    Creatinine monitoring  04/21/2019   

## 2019-01-14 ENCOUNTER — PATIENT MESSAGE (OUTPATIENT)
Dept: FAMILY MEDICINE CLINIC | Age: 79
End: 2019-01-14

## 2019-01-14 DIAGNOSIS — F43.21 ADJUSTMENT DISORDER WITH DEPRESSED MOOD: ICD-10-CM

## 2019-01-15 RX ORDER — BUPROPION HYDROCHLORIDE 150 MG/1
150 TABLET ORAL EVERY MORNING
Qty: 30 TABLET | Refills: 0 | Status: SHIPPED | OUTPATIENT
Start: 2019-01-15 | End: 2019-03-11 | Stop reason: SDUPTHER

## 2019-02-25 ENCOUNTER — HOSPITAL ENCOUNTER (OUTPATIENT)
Dept: ULTRASOUND IMAGING | Age: 79
Discharge: HOME | End: 2019-02-25
Payer: MEDICARE

## 2019-02-25 DIAGNOSIS — N28.1 RENAL CYST: ICD-10-CM

## 2019-02-25 PROCEDURE — 76770 US EXAM ABDO BACK WALL COMP: CPT

## 2019-03-06 DIAGNOSIS — F43.21 ADJUSTMENT DISORDER WITH DEPRESSED MOOD: ICD-10-CM

## 2019-03-06 RX ORDER — BUPROPION HYDROCHLORIDE 150 MG/1
150 TABLET ORAL EVERY MORNING
Qty: 90 TABLET | Refills: 2 | OUTPATIENT
Start: 2019-03-06

## 2019-03-07 ENCOUNTER — OFFICE VISIT (OUTPATIENT)
Dept: UROLOGY | Age: 79
End: 2019-03-07
Payer: MEDICARE

## 2019-03-07 VITALS
SYSTOLIC BLOOD PRESSURE: 134 MMHG | WEIGHT: 228 LBS | BODY MASS INDEX: 30.22 KG/M2 | DIASTOLIC BLOOD PRESSURE: 80 MMHG | HEIGHT: 73 IN

## 2019-03-07 DIAGNOSIS — N28.1 RENAL CYST, LEFT: ICD-10-CM

## 2019-03-07 DIAGNOSIS — C61 PROSTATE CANCER (HCC): ICD-10-CM

## 2019-03-07 DIAGNOSIS — N52.02 CORPORO-VENOUS OCCLUSIVE ERECTILE DYSFUNCTION: ICD-10-CM

## 2019-03-07 DIAGNOSIS — C61 MALIGNANT NEOPLASM OF PROSTATE (HCC): Primary | ICD-10-CM

## 2019-03-07 PROCEDURE — G8417 CALC BMI ABV UP PARAM F/U: HCPCS | Performed by: PHYSICIAN ASSISTANT

## 2019-03-07 PROCEDURE — 1101F PT FALLS ASSESS-DOCD LE1/YR: CPT | Performed by: PHYSICIAN ASSISTANT

## 2019-03-07 PROCEDURE — G8484 FLU IMMUNIZE NO ADMIN: HCPCS | Performed by: PHYSICIAN ASSISTANT

## 2019-03-07 PROCEDURE — 1036F TOBACCO NON-USER: CPT | Performed by: PHYSICIAN ASSISTANT

## 2019-03-07 PROCEDURE — 4040F PNEUMOC VAC/ADMIN/RCVD: CPT | Performed by: PHYSICIAN ASSISTANT

## 2019-03-07 PROCEDURE — G8427 DOCREV CUR MEDS BY ELIG CLIN: HCPCS | Performed by: PHYSICIAN ASSISTANT

## 2019-03-07 PROCEDURE — 99213 OFFICE O/P EST LOW 20 MIN: CPT | Performed by: PHYSICIAN ASSISTANT

## 2019-03-07 PROCEDURE — G8598 ASA/ANTIPLAT THER USED: HCPCS | Performed by: PHYSICIAN ASSISTANT

## 2019-03-07 PROCEDURE — 1123F ACP DISCUSS/DSCN MKR DOCD: CPT | Performed by: PHYSICIAN ASSISTANT

## 2019-03-07 RX ORDER — SILDENAFIL CITRATE 20 MG/1
TABLET ORAL
Qty: 30 TABLET | Refills: 11 | Status: SHIPPED | OUTPATIENT
Start: 2019-03-07 | End: 2021-03-18 | Stop reason: SDUPTHER

## 2019-03-11 DIAGNOSIS — F43.21 ADJUSTMENT DISORDER WITH DEPRESSED MOOD: ICD-10-CM

## 2019-03-11 RX ORDER — BUPROPION HYDROCHLORIDE 150 MG/1
150 TABLET ORAL EVERY MORNING
Qty: 90 TABLET | Refills: 3 | Status: SHIPPED | OUTPATIENT
Start: 2019-03-11 | End: 2020-04-16

## 2019-03-15 ENCOUNTER — HOSPITAL ENCOUNTER (OUTPATIENT)
Dept: MRI IMAGING | Age: 79
Discharge: HOME | End: 2019-03-15
Payer: MEDICARE

## 2019-03-15 DIAGNOSIS — C61 PROSTATE CANCER (HCC): ICD-10-CM

## 2019-03-15 LAB — POC CREATININE WHOLE BLOOD: 0.8 MG/DL (ref 0.5–1.2)

## 2019-03-15 PROCEDURE — 82565 ASSAY OF CREATININE: CPT

## 2019-03-15 PROCEDURE — A9579 GAD-BASE MR CONTRAST NOS,1ML: HCPCS | Performed by: PHYSICIAN ASSISTANT

## 2019-03-15 PROCEDURE — 76377 3D RENDER W/INTRP POSTPROCES: CPT

## 2019-03-15 PROCEDURE — 6360000004 HC RX CONTRAST MEDICATION: Performed by: PHYSICIAN ASSISTANT

## 2019-03-15 RX ADMIN — GADOTERIDOL 20 ML: 279.3 INJECTION, SOLUTION INTRAVENOUS at 08:53

## 2019-03-29 ENCOUNTER — TELEPHONE (OUTPATIENT)
Dept: UROLOGY | Age: 79
End: 2019-03-29

## 2019-03-29 NOTE — TELEPHONE ENCOUNTER
Mr. Londono's MRI of the pelvis with contrast is very stable. It is classified as PI-RADS 2 (no significant changes noted). Post surgical changes are noted from previous biopsy and areas of BPH. Due to the stability of his MRI, we will hold on repeat prostate biopsy at this time. If his PSA level demonstrates a persistent climb, we will discuss prostate biopsy at a later date.

## 2019-04-01 NOTE — TELEPHONE ENCOUNTER
I called the patient and advised him of the message from [facility]. MRI of the pelvis with contrast is very stable. It is classified as PI-RADS 2 (no significant changes noted). Post surgical changes are noted from previous biopsy and areas of BPH. Due to the stability of his MRI, we will hold on repeat prostate biopsy at this time. If his PSA level demonstrates a persistent climb, we will discuss prostate biopsy at a later date. He voiced understanding.

## 2019-04-23 ENCOUNTER — OFFICE VISIT (OUTPATIENT)
Dept: CARDIOLOGY CLINIC | Age: 79
End: 2019-04-23
Payer: MEDICARE

## 2019-04-23 VITALS
HEART RATE: 73 BPM | DIASTOLIC BLOOD PRESSURE: 80 MMHG | WEIGHT: 229 LBS | BODY MASS INDEX: 30.35 KG/M2 | SYSTOLIC BLOOD PRESSURE: 140 MMHG | HEIGHT: 73 IN

## 2019-04-23 DIAGNOSIS — E78.01 FAMILIAL HYPERCHOLESTEROLEMIA: ICD-10-CM

## 2019-04-23 DIAGNOSIS — I10 ESSENTIAL HYPERTENSION: ICD-10-CM

## 2019-04-23 DIAGNOSIS — I25.10 CORONARY ARTERY DISEASE INVOLVING NATIVE CORONARY ARTERY OF NATIVE HEART WITHOUT ANGINA PECTORIS: Primary | ICD-10-CM

## 2019-04-23 DIAGNOSIS — I25.10 CORONARY ARTERY DISEASE INVOLVING NATIVE HEART WITHOUT ANGINA PECTORIS, UNSPECIFIED VESSEL OR LESION TYPE: ICD-10-CM

## 2019-04-23 PROCEDURE — 1123F ACP DISCUSS/DSCN MKR DOCD: CPT | Performed by: NUCLEAR MEDICINE

## 2019-04-23 PROCEDURE — 4040F PNEUMOC VAC/ADMIN/RCVD: CPT | Performed by: NUCLEAR MEDICINE

## 2019-04-23 PROCEDURE — G8598 ASA/ANTIPLAT THER USED: HCPCS | Performed by: NUCLEAR MEDICINE

## 2019-04-23 PROCEDURE — G8417 CALC BMI ABV UP PARAM F/U: HCPCS | Performed by: NUCLEAR MEDICINE

## 2019-04-23 PROCEDURE — 99213 OFFICE O/P EST LOW 20 MIN: CPT | Performed by: NUCLEAR MEDICINE

## 2019-04-23 PROCEDURE — 93000 ELECTROCARDIOGRAM COMPLETE: CPT | Performed by: NUCLEAR MEDICINE

## 2019-04-23 PROCEDURE — G8427 DOCREV CUR MEDS BY ELIG CLIN: HCPCS | Performed by: NUCLEAR MEDICINE

## 2019-04-23 PROCEDURE — 1036F TOBACCO NON-USER: CPT | Performed by: NUCLEAR MEDICINE

## 2019-04-23 RX ORDER — CLOPIDOGREL BISULFATE 75 MG/1
75 TABLET ORAL DAILY
Qty: 90 TABLET | Refills: 3 | Status: SHIPPED | OUTPATIENT
Start: 2019-04-23 | End: 2020-12-07

## 2019-04-23 NOTE — PROGRESS NOTES
[address] CARDIOLOGY  [place] [address] [address]  [address] [address]  Dept: [phone]  Dept Fax: [phone]  Loc: [phone]    Visit Date: 4/23/2019    Oj Resendez is a 70 y.o. male who presents todayfor:  Chief Complaint   Patient presents with    1 Year Follow Up    Hypertension    Coronary Artery Disease    Hyperlipidemia   known stent to RCA a while back   No chest pain   No changes in breathing  Staying active   Bp is stable         HPI:  HPI  Past Medical History:   Diagnosis Date    Arrhythmia     BPH (benign prostatic hyperplasia)     Dr. Nolasco Sports Dr. Graciela Morris now    CAD (coronary artery disease)     C SILENT ISCHEMIA    Depression     Dizziness - light-headed     Erectile dysfunction     Hyperlipidemia     Hypertension     Prostate cancer ([address] 3/7/2019    Tinnitus, subjective       Past Surgical History:   Procedure Laterality Date    CARDIAC CATHETERIZATION  12/20/2006    Multivessel CAD, diffuse in nature w/ more significant being RCA, which is site of ischemia. Normal LV function. EF 55%.  CARDIOVASCULAR STRESS TEST  11-10-06    Sinus rhythm. Frequent PVC's. No V-tach, SVT, or pauses. Abnormal Holter monitor.  CARPAL TUNNEL RELEASE  1/09    bilateral-Dr. Estrella Pastor    CATARACT REMOVAL Bilateral 2015    Bilateral - staged    COLONOSCOPY      EYE SURGERY      HERNIA REPAIR  1970's    bilateral inguinal hernia    PROSTATE SURGERY      PTCA  12/06    mid RCA    ROTATOR CUFF REPAIR           ROTATOR CUFF REPAIR  07/2018    Dr Krystle Barajas ECHOCARDIOGRAM  12/05/2006    Preserved LV size w/ systolic function at lower limits of normal. EF 50%. Possible bicuspal AV, consider PAULA. Mild MR and TR. No significant AS or AI at current time.      TURP  8/14/14    TURP  09/01/2015     Family History   Problem Relation Age of Onset    Cancer Mother     Heart Disease Father     High Cholesterol Father     or weight loss  Pulmonary:    No dyspnea, no wheezing  Cardiac:    Denies recent chest pain,   GI:     No nausea or vomiting, no abdominal pain  Neuro:    No dizziness or light headedness,   Musculoskeletal:  No recent active issues  Extremities:   No edema, good peripheral pulses      Objective:  Physical Exam  BP (!) 144/80   Pulse 73   Ht 6' 1\" (1.854 m)   Wt 229 lb (103.9 kg)   BMI 30.21 kg/m²   General:   Well developed, well nourished  Lungs:   Clear to auscultation  Heart:    Normal S1 S2, Slight murmur. no rubs, no gallops  Abdomen:   Soft, non tender, no organomegalies, positive bowel sounds  Extremities:   No edema, no cyanosis, good peripheral pulses  Neurological:   Awake, alert, oriented. No obvious focal deficits  Musculoskelatal:  No obvious deformities    Assessment:      Diagnosis Orders   1. Coronary artery disease involving native coronary artery of native heart without angina pectoris  EKG 12 Lead   2. Coronary artery disease involving native heart without angina pectoris, unspecified vessel or lesion type  clopidogrel (PLAVIX) 75 MG tablet   3. Essential hypertension     4. Familial hypercholesterolemia     cardiac fair for now   ECG in office was done today. I reviewed the ECG. No acute findings      Plan:  No follow-ups on file. As above  Continue risk factor modification and medical management  Thank you for allowing me to participate in the care of your patient. Please don't hesitate to contact me regarding any further issues related to the patient care    Orders Placed:  Orders Placed This Encounter   Procedures    EKG 12 Lead     Order Specific Question:   Reason for Exam?     Answer: Other       Medications Prescribed:  No orders of the defined types were placed in this encounter. Discussed use, benefit, and side effects of prescribed medications. All patient questions answered. Pt voicedunderstanding. Instructed to continue current medications, diet and exercise.  Continue risk factor modification and medical management. Patient agreed with treatment plan. Follow up as directed.     Electronically signedby Salomón Wilson MD on 4/23/2019 at 8:35 AM

## 2019-04-23 NOTE — PROGRESS NOTES
1 year follow-up. He denies having any chest pain, SOB, dizziness, lightheadedness, palpitations or KI. EKG completed.

## 2019-06-18 ENCOUNTER — TELEPHONE (OUTPATIENT)
Dept: UROLOGY | Age: 79
End: 2019-06-18

## 2019-06-18 NOTE — TELEPHONE ENCOUNTER
The patient stated 6-7 years ago he was told a low grade cancer was present. He went to donate blood and was rejected  due to the cancer DX. He needs clearance so he can donate blood. Please advise. Thank you.

## 2019-06-19 NOTE — TELEPHONE ENCOUNTER
Called pt to inform him of your note and he stated that the last 5 years he has been donating blood. He said either he has not been telling them the truth or they haven't been asking the the right question? He did voice understanding and said that he will no longer donate if that is what the recommendation is.      Thank you

## 2019-06-19 NOTE — TELEPHONE ENCOUNTER
Mr. Santi Esteban has Miles 3+3=6 prostate cancer diagnosed in 2015. He has never received treatment for this cancer and he is undergoing strict surveillance. According to the [employer] blood donation guidelines, people with cancer (other than leukemias and lymphomas) may donate if the cancer has been successfully treated,  it has been more than 12 months since treatment was completed, and there has been no cancer recurrence in this time. Even though he has only a small amount of low-grade disease, he still harbors the disease and has never been treated. This would make him ineligible to donate per the [employer] guidelines. If he has a prostatectomy in the future and there are no signs of disease recurrence, he could donate blood one year from his surgery date.

## 2019-08-07 LAB
ALBUMIN SERPL-MCNC: 4 G/DL (ref 3.2–5.3)
ALK PHOSPHATASE: 86 U/L (ref 39–130)
ALT SERPL-CCNC: 23 U/L (ref 0–40)
ANION GAP SERPL CALCULATED.3IONS-SCNC: 7 MMOL/L (ref 4–12)
AST SERPL-CCNC: 24 U/L (ref 0–41)
AVERAGE GLUCOSE: 120 MG/DL
BILIRUB SERPL-MCNC: 1 MG/DL (ref 0.3–1.2)
BUN BLDV-MCNC: 17 MG/DL (ref 5–27)
CALCIUM SERPL-MCNC: 9.4 MG/DL (ref 8.5–10.5)
CHLORIDE BLD-SCNC: 105 MMOL/L (ref 98–109)
CHOLESTEROL/HDL RATIO: 5.9 (ref 1–5)
CHOLESTEROL: 176 MG/DL (ref 150–200)
CO2: 30 MMOL/L (ref 22–32)
CREAT SERPL-MCNC: 0.9 MG/DL (ref 0.6–1.3)
EGFR AFRICAN AMERICAN: >60 ML/MIN/1.73SQ.M
EGFR IF NONAFRICAN AMERICAN: >60 ML/MIN/1.73SQ.M
GLUCOSE: 107 MG/DL (ref 65–99)
HBA1C MFR BLD: 5.8 % (ref 4.4–6.4)
HDLC SERPL-MCNC: 30 MG/DL
LDL CHOLESTEROL CALCULATED: 90 MG/DL
LDL/HDL RATIO: 3
POTASSIUM SERPL-SCNC: 4.9 MMOL/L (ref 3.5–5)
SODIUM BLD-SCNC: 142 MMOL/L (ref 134–146)
T4 FREE: 0.96 NG/DL (ref 0.61–1.6)
TOTAL PROTEIN: 6.7 G/DL (ref 6–8)
TRIGL SERPL-MCNC: 281 MG/DL (ref 27–150)
TSH SERPL DL<=0.05 MIU/L-ACNC: 1.86 UIU/ML (ref 0.49–4.67)
VLDLC SERPL CALC-MCNC: 56 MG/DL (ref 0–30)

## 2019-08-27 DIAGNOSIS — E78.5 HYPERLIPIDEMIA, UNSPECIFIED HYPERLIPIDEMIA TYPE: ICD-10-CM

## 2019-08-27 RX ORDER — ATORVASTATIN CALCIUM 80 MG/1
TABLET, FILM COATED ORAL
Qty: 90 TABLET | Refills: 1 | Status: SHIPPED | OUTPATIENT
Start: 2019-08-27 | End: 2020-08-25

## 2019-09-04 NOTE — PROGRESS NOTES
Mr. Chalino Velázquez is a 70year-old-male was seen in follow up for BPH with obstruction. He is status post TURP with cystolitholapaxy in 9/15 for a bladder neck contracture, prostatic regrowth, and prostate stones. His pathology at this time was significant for a small focus of [place] 3+3=6 prostate cancer and acute/chronic prostatitis. He elected active surveillance and has been followed very closely with serial PSA levels. His PSA level increased from 0.51 (1/17) to 1.64 (8/17), necessitating a MRI of the pelvis in August 2017. Asymmetric enhancement of the seminal vesicles was noted, so a TRUS prostate biopsy was performed in 9/17. His pathology was significant for a small amount of [place] 3+3=6 in the left mid and left base, along with acute and chronic prostatitis. A MRI of the pelvis was obtained in March 2019 revealing post-surgical changes in the previous biopsy area and areas of BPH. It was classified as PI-RADS 2. In regards to his urinary health, he reports rare urgency, frequency, and nocturia x 1. He reports rare weakened stream. He believes that his irritative symptomatology is exacerbated by dietary factors and stress. He has noticed significant improvement in his irritative symptomatology with Prelief before meals. He denies dysuria, gross hematuria, flank pain, fever, chills, suprapubic pain, and urinary retention.  He also denies night sweats, poor appetite, unexplained weight loss, fatigue, malaise, hip or back pain. He presents today to review his PSA and for general follow-up.           Past Medical History:   Diagnosis Date    Arrhythmia     BPH (benign prostatic hyperplasia)     Dr. Maris Tomlinson now    CAD (coronary artery disease)     C SILENT ISCHEMIA    Depression     Dizziness - light-headed     Erectile dysfunction     Hyperlipidemia     Hypertension     Prostate cancer ([facility] [address] 3/7/2019    Tinnitus, subjective        Past Surgical History:   Procedure Laterality Date    CARDIAC CATHETERIZATION  12/20/2006    Multivessel CAD, diffuse in nature w/ more significant being RCA, which is site of ischemia. Normal LV function. EF 55%.  CARDIOVASCULAR STRESS TEST  11-10-06    Sinus rhythm. Frequent PVC's. No V-tach, SVT, or pauses. Abnormal Holter monitor.  CARPAL TUNNEL RELEASE  1/09    bilateral-Dr. Burroughs Favor    CATARACT REMOVAL Bilateral 2015    Bilateral - staged    COLONOSCOPY      EYE SURGERY      HERNIA REPAIR  1970's    bilateral inguinal hernia    PROSTATE SURGERY      PTCA  12/06    mid RCA    ROTATOR CUFF REPAIR           ROTATOR CUFF REPAIR  07/2018    Dr Garrett Joseph ECHOCARDIOGRAM  12/05/2006    Preserved LV size w/ systolic function at lower limits of normal. EF 50%. Possible bicuspal AV, consider PAULA. Mild MR and TR. No significant AS or AI at current time.  TURP  8/14/14    TURP  09/01/2015       Current Outpatient Medications on File Prior to Visit   Medication Sig Dispense Refill    atorvastatin (LIPITOR) 80 MG tablet TAKE 1 TABLET BY MOUTH ONE TIME A DAY  90 tablet 1    clopidogrel (PLAVIX) 75 MG tablet Take 1 tablet by mouth daily 90 tablet 3    buPROPion (WELLBUTRIN XL) 150 MG extended release tablet Take 1 tablet by mouth every morning 90 tablet 3    sildenafil (REVATIO) 20 MG tablet Use 3-5 tablets once daily as needed 30-60 minutes prior to intercourse. 30 tablet 11    Omega-3 Fatty Acids (FISH OIL) 1000 MG CAPS Take 2,000 mg by mouth daily       amLODIPine (NORVASC) 5 MG tablet Take 1 tablet by mouth daily 90 tablet 3    losartan (COZAAR) 100 MG tablet TAKE 1 TABLET DAILY 90 tablet 3    aspirin 81 MG tablet Take 81 mg by mouth daily      Misc Natural Products (GLUCOSAMINE CHONDROITIN ADV PO) Take 1 tablet by mouth daily      Cholecalciferol (VITAMIN D3) 2000 UNITS CAPS Take by mouth      ibuprofen (ADVIL;MOTRIN) 800 MG tablet Take 800 mg by mouth as needed for Pain.       Coenzyme Q10 (CO Q-10) 200 MG CAPS Take 1 capsule by mouth daily. No current facility-administered medications on file prior to visit. No Known Allergies    Family History   Problem Relation Age of Onset    Cancer Mother     Heart Disease Father     High Cholesterol Father     Cancer Other         kidney, colon, thyroid cancers-multiple siblings    Cancer Brother        Social History     Socioeconomic History    Marital status:      Spouse name: Not on file    Number of children: Not on file    Years of education: Not on file    Highest education level: Not on file   Occupational History    Not on file   Social Needs    Financial resource strain: Not on file    Food insecurity:     Worry: Not on file     Inability: Not on file    Transportation needs:     Medical: Not on file     Non-medical: Not on file   Tobacco Use    Smoking status: Former Smoker     Packs/day: 1.50     Years: 3.00     Pack years: 4.50     Types: Cigarettes     Last attempt to quit: 1967     Years since quittin.2    Smokeless tobacco: Never Used   Substance and Sexual Activity    Alcohol use: Yes     Alcohol/week: 0.0 standard drinks     Comment: socially- 2-3 beers/HS.      Drug use: No    Sexual activity: Yes     Partners: Female   Lifestyle    Physical activity:     Days per week: Not on file     Minutes per session: Not on file    Stress: Not on file   Relationships    Social connections:     Talks on phone: Not on file     Gets together: Not on file     Attends Jainism service: Not on file     Active member of club or organization: Not on file     Attends meetings of clubs or organizations: Not on file     Relationship status: Not on file    Intimate partner violence:     Fear of current or ex partner: Not on file     Emotionally abused: Not on file     Physically abused: Not on file     Forced sexual activity: Not on file   Other Topics Concern    Not on file   Social History Narrative    Not on file       Review of Character, Urine Clear [id number]       Lab Results   Component Value Date    CREATININE 0.90 08/06/2019    BUN 17 08/06/2019     08/06/2019    K 4.9 08/06/2019     08/06/2019    CO2 30 08/06/2019       Lab Results   Component Value Date    PSA 0.44 01/09/2018    PSA 0.51 01/06/2017    PSA 0.48 07/13/2016         Plan:  1. Prostate cancer- [place] 3+3=6 disease. Under active surveillance. TRUS prostate biopsy in 9/17 significant for [place] 3+3=6 in left mid and left base. PSA has ranged from 1.64 to 0. 44. MRI of the pelvis was obtained in March 2019 revealing post-surgical changes in the previous biopsy area and areas of BPH. It was classified as PI-RADS 2. Even though the MRI looked unremarkable, it has been two years since his last prostate biopsy. We discussed the need for periodic prostate biopsies on active surveillance. He would like to hold at this time due to his stable PSA of 0.49. His previous PSA was 0.683 in December 2018. Will continue to monitor PSA levels every 3 months. Will call patient with next PSA results in three months. Follow-up in six months.     2. BPH with obstruction- S/p TURP 9/15. He has very mild irritative symptomatology and does not desire treatment at this time. Patient was instructed to call immediately if his irritative or obstructive symptoms worsen.      3. Family history of renal cell carcinoma- Renal US as above. Stable simple 1.3 cm lesion of left kidney. Since patient has an extensive family history of renal cell carcinoma (brothers x 3) will obtain follow-up renal US every 12 months. Due March 2020.

## 2019-09-05 ENCOUNTER — OFFICE VISIT (OUTPATIENT)
Dept: UROLOGY | Age: 79
End: 2019-09-05
Payer: MEDICARE

## 2019-09-05 VITALS
DIASTOLIC BLOOD PRESSURE: 82 MMHG | SYSTOLIC BLOOD PRESSURE: 132 MMHG | WEIGHT: 228 LBS | BODY MASS INDEX: 30.22 KG/M2 | HEIGHT: 73 IN

## 2019-09-05 DIAGNOSIS — C61 MALIGNANT NEOPLASM OF PROSTATE (HCC): Primary | ICD-10-CM

## 2019-09-05 LAB
BILIRUBIN URINE: NEGATIVE
BLOOD URINE, POC: NEGATIVE
CHARACTER, URINE: CLEAR
COLOR, URINE: YELLOW
GLUCOSE URINE: NEGATIVE MG/DL
KETONES, URINE: NEGATIVE
LEUKOCYTE CLUMPS, URINE: NEGATIVE
NITRITE, URINE: NEGATIVE
PH, URINE: 7 (ref 5–9)
PROTEIN, URINE: NEGATIVE MG/DL
PSA, ULTRASENSITIVE: 0.49 NG/ML (ref 0–4)
SPECIFIC GRAVITY, URINE: 1.01 (ref 1–1.03)
UROBILINOGEN, URINE: 1 EU/DL (ref 0–1)

## 2019-09-05 PROCEDURE — G8428 CUR MEDS NOT DOCUMENT: HCPCS | Performed by: PHYSICIAN ASSISTANT

## 2019-09-05 PROCEDURE — G8598 ASA/ANTIPLAT THER USED: HCPCS | Performed by: PHYSICIAN ASSISTANT

## 2019-09-05 PROCEDURE — 99213 OFFICE O/P EST LOW 20 MIN: CPT | Performed by: PHYSICIAN ASSISTANT

## 2019-09-05 PROCEDURE — G8417 CALC BMI ABV UP PARAM F/U: HCPCS | Performed by: PHYSICIAN ASSISTANT

## 2019-09-05 PROCEDURE — 1123F ACP DISCUSS/DSCN MKR DOCD: CPT | Performed by: PHYSICIAN ASSISTANT

## 2019-09-05 PROCEDURE — 81003 URINALYSIS AUTO W/O SCOPE: CPT | Performed by: PHYSICIAN ASSISTANT

## 2019-09-05 PROCEDURE — 1036F TOBACCO NON-USER: CPT | Performed by: PHYSICIAN ASSISTANT

## 2019-09-05 PROCEDURE — 4040F PNEUMOC VAC/ADMIN/RCVD: CPT | Performed by: PHYSICIAN ASSISTANT

## 2019-09-23 DIAGNOSIS — I10 ESSENTIAL HYPERTENSION: ICD-10-CM

## 2019-09-24 RX ORDER — LOSARTAN POTASSIUM 100 MG/1
TABLET ORAL
Qty: 90 TABLET | Refills: 3 | Status: SHIPPED | OUTPATIENT
Start: 2019-09-24 | End: 2020-11-30

## 2019-09-24 RX ORDER — AMLODIPINE BESYLATE 5 MG/1
TABLET ORAL
Qty: 90 TABLET | Refills: 3 | Status: SHIPPED | OUTPATIENT
Start: 2019-09-24 | End: 2020-11-29

## 2019-12-01 DIAGNOSIS — I25.10 CORONARY ARTERY DISEASE INVOLVING NATIVE HEART WITHOUT ANGINA PECTORIS, UNSPECIFIED VESSEL OR LESION TYPE: ICD-10-CM

## 2019-12-02 RX ORDER — CLOPIDOGREL BISULFATE 75 MG/1
TABLET ORAL
Qty: 90 TABLET | Refills: 3 | Status: SHIPPED | OUTPATIENT
Start: 2019-12-02 | End: 2020-01-08 | Stop reason: SDUPTHER

## 2019-12-12 LAB — PSA, ULTRASENSITIVE: 0.54 NG/ML (ref 0–4)

## 2019-12-15 ENCOUNTER — TELEPHONE (OUTPATIENT)
Dept: UROLOGY | Age: 79
End: 2019-12-15

## 2020-01-08 ENCOUNTER — OFFICE VISIT (OUTPATIENT)
Dept: FAMILY MEDICINE CLINIC | Age: 80
End: 2020-01-08
Payer: MEDICARE

## 2020-01-08 VITALS
RESPIRATION RATE: 17 BRPM | HEART RATE: 68 BPM | BODY MASS INDEX: 30.27 KG/M2 | SYSTOLIC BLOOD PRESSURE: 136 MMHG | DIASTOLIC BLOOD PRESSURE: 84 MMHG | TEMPERATURE: 98.4 F | WEIGHT: 229.4 LBS

## 2020-01-08 PROCEDURE — 99214 OFFICE O/P EST MOD 30 MIN: CPT | Performed by: FAMILY MEDICINE

## 2020-01-08 PROCEDURE — 90653 IIV ADJUVANT VACCINE IM: CPT | Performed by: FAMILY MEDICINE

## 2020-01-08 PROCEDURE — 4040F PNEUMOC VAC/ADMIN/RCVD: CPT | Performed by: FAMILY MEDICINE

## 2020-01-08 PROCEDURE — G8482 FLU IMMUNIZE ORDER/ADMIN: HCPCS | Performed by: FAMILY MEDICINE

## 2020-01-08 PROCEDURE — G8427 DOCREV CUR MEDS BY ELIG CLIN: HCPCS | Performed by: FAMILY MEDICINE

## 2020-01-08 PROCEDURE — G0008 ADMIN INFLUENZA VIRUS VAC: HCPCS | Performed by: FAMILY MEDICINE

## 2020-01-08 PROCEDURE — 1036F TOBACCO NON-USER: CPT | Performed by: FAMILY MEDICINE

## 2020-01-08 PROCEDURE — G8417 CALC BMI ABV UP PARAM F/U: HCPCS | Performed by: FAMILY MEDICINE

## 2020-01-08 PROCEDURE — 1123F ACP DISCUSS/DSCN MKR DOCD: CPT | Performed by: FAMILY MEDICINE

## 2020-01-08 RX ORDER — CYCLOBENZAPRINE HCL 10 MG
5-10 TABLET ORAL 3 TIMES DAILY PRN
Qty: 30 TABLET | Refills: 0 | Status: SHIPPED | OUTPATIENT
Start: 2020-01-08 | End: 2020-11-17

## 2020-01-08 SDOH — ECONOMIC STABILITY: FOOD INSECURITY: WITHIN THE PAST 12 MONTHS, THE FOOD YOU BOUGHT JUST DIDN'T LAST AND YOU DIDN'T HAVE MONEY TO GET MORE.: NEVER TRUE

## 2020-01-08 SDOH — ECONOMIC STABILITY: TRANSPORTATION INSECURITY
IN THE PAST 12 MONTHS, HAS THE LACK OF TRANSPORTATION KEPT YOU FROM MEDICAL APPOINTMENTS OR FROM GETTING MEDICATIONS?: NO

## 2020-01-08 SDOH — ECONOMIC STABILITY: TRANSPORTATION INSECURITY
IN THE PAST 12 MONTHS, HAS LACK OF TRANSPORTATION KEPT YOU FROM MEETINGS, WORK, OR FROM GETTING THINGS NEEDED FOR DAILY LIVING?: NO

## 2020-01-08 SDOH — ECONOMIC STABILITY: FOOD INSECURITY: WITHIN THE PAST 12 MONTHS, YOU WORRIED THAT YOUR FOOD WOULD RUN OUT BEFORE YOU GOT MONEY TO BUY MORE.: NEVER TRUE

## 2020-01-08 SDOH — ECONOMIC STABILITY: INCOME INSECURITY: HOW HARD IS IT FOR YOU TO PAY FOR THE VERY BASICS LIKE FOOD, HOUSING, MEDICAL CARE, AND HEATING?: NOT HARD AT ALL

## 2020-01-08 ASSESSMENT — ENCOUNTER SYMPTOMS
CONSTIPATION: 0
ABDOMINAL PAIN: 0
BLOOD IN STOOL: 0
VOMITING: 0
SHORTNESS OF BREATH: 0
ANAL BLEEDING: 0
CHEST TIGHTNESS: 0
DIARRHEA: 0
NAUSEA: 0

## 2020-01-08 ASSESSMENT — PATIENT HEALTH QUESTIONNAIRE - PHQ9
SUM OF ALL RESPONSES TO PHQ QUESTIONS 1-9: 0
1. LITTLE INTEREST OR PLEASURE IN DOING THINGS: 0
SUM OF ALL RESPONSES TO PHQ QUESTIONS 1-9: 0
2. FEELING DOWN, DEPRESSED OR HOPELESS: 0
SUM OF ALL RESPONSES TO PHQ9 QUESTIONS 1 & 2: 0
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
## 2020-01-08 NOTE — PATIENT INSTRUCTIONS
FLU SHOT today. COLONOSCOPY done 10/11/2017 per Dr. Mandi Yao- to do again in 10/2020. (updated 1/8/2020)  JOSEFA and PSA management per [facility]/ [facility]- to follow up 3/5/2020- to have PSA every 3 months. OK for Flexeril 10 mg- 1/2-1 pill 3x/day as needed for back spasm. #30/ no refills. After discussion with pt, he would like to hold on changing max dose Lipitor and instead, continue to work on diet, exercise (30 minutes, 5x/week), and weight loss for optimal cardiovascular health. pt would like to recheck in 8/2020 with annual labs. (updated 1/8/2020)  Check HGA1C, FLP, CMP, and CBC after 8/6/2020  Continue current medicines   No refills needed today.     Follow up in 6-12 months.

## 2020-02-28 ENCOUNTER — HOSPITAL ENCOUNTER (OUTPATIENT)
Dept: ULTRASOUND IMAGING | Age: 80
Discharge: HOME | End: 2020-02-28
Payer: MEDICARE

## 2020-02-28 PROCEDURE — 76770 US EXAM ABDO BACK WALL COMP: CPT

## 2020-03-04 LAB — PSA, ULTRASENSITIVE: 0.5 NG/ML (ref 0–4)

## 2020-03-04 NOTE — PROGRESS NOTES
CARDIAC CATHETERIZATION  12/20/2006    Multivessel CAD, diffuse in nature w/ more significant being RCA, which is site of ischemia. Normal LV function. EF 55%.  CARDIOVASCULAR STRESS TEST  11-10-06    Sinus rhythm. Frequent PVC's. No V-tach, SVT, or pauses. Abnormal Holter monitor.  CARPAL TUNNEL RELEASE  1/09    bilateral-Dr. Jame Rashid    CATARACT REMOVAL Bilateral 2015    Bilateral - staged    COLONOSCOPY      EYE SURGERY      HERNIA REPAIR  1970's    bilateral inguinal hernia    PROSTATE SURGERY      PTCA  12/06    mid RCA    ROTATOR CUFF REPAIR           ROTATOR CUFF REPAIR  07/2018    Dr Rito Ovalles ECHOCARDIOGRAM  12/05/2006    Preserved LV size w/ systolic function at lower limits of normal. EF 50%. Possible bicuspal AV, consider PAULA. Mild MR and TR. No significant AS or AI at current time.  TURP  8/14/14    TURP  09/01/2015       Current Outpatient Medications on File Prior to Visit   Medication Sig Dispense Refill    losartan (COZAAR) 100 MG tablet TAKE 1 TABLET BY MOUTH ONCE DAILY 90 tablet 3    amLODIPine (NORVASC) 5 MG tablet TAKE 1 TABLET BY MOUTH ONCE DAILY 90 tablet 3    atorvastatin (LIPITOR) 80 MG tablet TAKE 1 TABLET BY MOUTH ONE TIME A DAY  90 tablet 1    clopidogrel (PLAVIX) 75 MG tablet Take 1 tablet by mouth daily 90 tablet 3    buPROPion (WELLBUTRIN XL) 150 MG extended release tablet Take 1 tablet by mouth every morning 90 tablet 3    sildenafil (REVATIO) 20 MG tablet Use 3-5 tablets once daily as needed 30-60 minutes prior to intercourse. 30 tablet 11    Omega-3 Fatty Acids (FISH OIL) 1000 MG CAPS Take 2,000 mg by mouth daily       aspirin 81 MG tablet Take 81 mg by mouth daily      Misc Natural Products (GLUCOSAMINE CHONDROITIN ADV PO) Take 1 tablet by mouth daily      Cholecalciferol (VITAMIN D3) 2000 UNITS CAPS Take by mouth      ibuprofen (ADVIL;MOTRIN) 800 MG tablet Take 800 mg by mouth as needed for Pain.       Coenzyme Q10 (CO Q-10) 200 MG CAPS Take 1 capsule by mouth daily. No current facility-administered medications on file prior to visit. No Known Allergies    Family History   Problem Relation Age of Onset    Cancer Mother     Heart Disease Father     High Cholesterol Father     Cancer Other         kidney, colon, thyroid cancers-multiple siblings    Cancer Brother        Social History     Socioeconomic History    Marital status:      Spouse name: Not on file    Number of children: Not on file    Years of education: Not on file    Highest education level: Not on file   Occupational History    Not on file   Social Needs    Financial resource strain: Not hard at all   [employer] insecurity:     Worry: Never true     Inability: Never true   Evince needs:     Medical: No     Non-medical: No   Tobacco Use    Smoking status: Former Smoker     Packs/day: 1.50     Years: 3.00     Pack years: 4.50     Types: Cigarettes     Last attempt to quit: 1967     Years since quittin.6    Smokeless tobacco: Never Used   Substance and Sexual Activity    Alcohol use: Yes     Alcohol/week: 0.0 standard drinks     Comment: socially- 2-3 beers/HS.      Drug use: No    Sexual activity: Yes     Partners: Female   Lifestyle    Physical activity:     Days per week: Not on file     Minutes per session: Not on file    Stress: Not on file   Relationships    Social connections:     Talks on phone: Not on file     Gets together: Not on file     Attends Worship service: Not on file     Active member of club or organization: Not on file     Attends meetings of clubs or organizations: Not on file     Relationship status: Not on file    Intimate partner violence:     Fear of current or ex partner: Not on file     Emotionally abused: Not on file     Physically abused: Not on file     Forced sexual activity: Not on file   Other Topics Concern    Not on file   Social History Narrative    Not on file       Review of

## 2020-03-05 ENCOUNTER — OFFICE VISIT (OUTPATIENT)
Dept: UROLOGY | Age: 80
End: 2020-03-05
Payer: MEDICARE

## 2020-03-05 VITALS
BODY MASS INDEX: 30.48 KG/M2 | SYSTOLIC BLOOD PRESSURE: 136 MMHG | WEIGHT: 230 LBS | HEIGHT: 73 IN | DIASTOLIC BLOOD PRESSURE: 74 MMHG

## 2020-03-05 PROCEDURE — G8482 FLU IMMUNIZE ORDER/ADMIN: HCPCS | Performed by: PHYSICIAN ASSISTANT

## 2020-03-05 PROCEDURE — G8417 CALC BMI ABV UP PARAM F/U: HCPCS | Performed by: PHYSICIAN ASSISTANT

## 2020-03-05 PROCEDURE — 99213 OFFICE O/P EST LOW 20 MIN: CPT | Performed by: PHYSICIAN ASSISTANT

## 2020-03-05 PROCEDURE — G8427 DOCREV CUR MEDS BY ELIG CLIN: HCPCS | Performed by: PHYSICIAN ASSISTANT

## 2020-03-05 PROCEDURE — 4040F PNEUMOC VAC/ADMIN/RCVD: CPT | Performed by: PHYSICIAN ASSISTANT

## 2020-03-05 PROCEDURE — 1036F TOBACCO NON-USER: CPT | Performed by: PHYSICIAN ASSISTANT

## 2020-03-05 PROCEDURE — 1123F ACP DISCUSS/DSCN MKR DOCD: CPT | Performed by: PHYSICIAN ASSISTANT

## 2020-03-11 ENCOUNTER — TELEPHONE (OUTPATIENT)
Dept: UROLOGY | Age: 80
End: 2020-03-11

## 2020-04-16 RX ORDER — BUPROPION HYDROCHLORIDE 150 MG/1
TABLET ORAL
Qty: 90 TABLET | Refills: 3 | Status: SHIPPED | OUTPATIENT
Start: 2020-04-16 | End: 2021-01-11 | Stop reason: SDUPTHER

## 2020-07-29 ENCOUNTER — OFFICE VISIT (OUTPATIENT)
Dept: CARDIOLOGY CLINIC | Age: 80
End: 2020-07-29
Payer: MEDICARE

## 2020-07-29 VITALS
BODY MASS INDEX: 29.03 KG/M2 | HEART RATE: 67 BPM | DIASTOLIC BLOOD PRESSURE: 76 MMHG | SYSTOLIC BLOOD PRESSURE: 140 MMHG | WEIGHT: 219 LBS | HEIGHT: 73 IN

## 2020-07-29 PROCEDURE — 1036F TOBACCO NON-USER: CPT | Performed by: NUCLEAR MEDICINE

## 2020-07-29 PROCEDURE — 99213 OFFICE O/P EST LOW 20 MIN: CPT | Performed by: NUCLEAR MEDICINE

## 2020-07-29 PROCEDURE — G8427 DOCREV CUR MEDS BY ELIG CLIN: HCPCS | Performed by: NUCLEAR MEDICINE

## 2020-07-29 PROCEDURE — 93000 ELECTROCARDIOGRAM COMPLETE: CPT | Performed by: NUCLEAR MEDICINE

## 2020-07-29 PROCEDURE — 1123F ACP DISCUSS/DSCN MKR DOCD: CPT | Performed by: NUCLEAR MEDICINE

## 2020-07-29 PROCEDURE — G8417 CALC BMI ABV UP PARAM F/U: HCPCS | Performed by: NUCLEAR MEDICINE

## 2020-07-29 PROCEDURE — 4040F PNEUMOC VAC/ADMIN/RCVD: CPT | Performed by: NUCLEAR MEDICINE

## 2020-07-29 NOTE — PROGRESS NOTES
[facility],[address] [address]  Dept: [phone]  Dept Fax: [phone]  Loc: [phone]    Visit Date: 7/29/2020    Daniel Rolon is a 70 y.o. male who presents todayfor:  Chief Complaint   Patient presents with    Check-Up    Hypertension    Hyperlipidemia    Coronary Artery Disease   known RCA stent   No chest pain  No changes in breathing  BP is stable  No dizziness  No syncope        HPI:  HPI  Past Medical History:   Diagnosis Date    Arrhythmia     BPH (benign prostatic hyperplasia)     Dr. Colleen Kiran now    CAD (coronary artery disease)     C SILENT ISCHEMIA    Depression     Dizziness - light-headed     Erectile dysfunction     Hyperlipidemia     Hypertension     Prostate cancer ([facility] [address] 3/7/2019    Tinnitus, subjective       Past Surgical History:   Procedure Laterality Date    CARDIAC CATHETERIZATION  12/20/2006    Multivessel CAD, diffuse in nature w/ more significant being RCA, which is site of ischemia. Normal LV function. EF 55%.  CARDIOVASCULAR STRESS TEST  11-10-06    Sinus rhythm. Frequent PVC's. No V-tach, SVT, or pauses. Abnormal Holter monitor.  CARPAL TUNNEL RELEASE  1/09    bilateral-Dr. Logan Maxwell    CATARACT REMOVAL Bilateral 2015    Bilateral - staged    COLONOSCOPY      EYE SURGERY      HERNIA REPAIR  1970's    bilateral inguinal hernia    PROSTATE SURGERY      PTCA  12/06    mid RCA    ROTATOR CUFF REPAIR           ROTATOR CUFF REPAIR  07/2018    Dr April Crews ECHOCARDIOGRAM  12/05/2006    Preserved LV size w/ systolic function at lower limits of normal. EF 50%. Possible bicuspal AV, consider PAULA. Mild MR and TR. No significant AS or AI at current time.      TURP  8/14/14    TURP  09/01/2015     Family History   Problem Relation Age of Onset    Cancer Mother     Heart Disease Father     High Cholesterol Father     Cancer Other kidney, colon, thyroid cancers-multiple siblings    Cancer Brother      Social History     Tobacco Use    Smoking status: Former Smoker     Packs/day: 1.50     Years: 3.00     Pack years: 4.50     Types: Cigarettes     Last attempt to quit: 1967     Years since quittin.0    Smokeless tobacco: Never Used   Substance Use Topics    Alcohol use: Yes     Alcohol/week: 0.0 standard drinks     Comment: socially- 2-3 beers/HS. Current Outpatient Medications   Medication Sig Dispense Refill    buPROPion (WELLBUTRIN XL) 150 MG extended release tablet TAKE 1 TABLET BY MOUTH ONCE DAILY IN THE MORNING 90 tablet 3    losartan (COZAAR) 100 MG tablet TAKE 1 TABLET BY MOUTH ONCE DAILY 90 tablet 3    amLODIPine (NORVASC) 5 MG tablet TAKE 1 TABLET BY MOUTH ONCE DAILY 90 tablet 3    atorvastatin (LIPITOR) 80 MG tablet TAKE 1 TABLET BY MOUTH ONE TIME A DAY  90 tablet 1    clopidogrel (PLAVIX) 75 MG tablet Take 1 tablet by mouth daily 90 tablet 3    sildenafil (REVATIO) 20 MG tablet Use 3-5 tablets once daily as needed 30-60 minutes prior to intercourse. 30 tablet 11    Omega-3 Fatty Acids (FISH OIL) 1000 MG CAPS Take 2,000 mg by mouth daily       aspirin 81 MG tablet Take 81 mg by mouth daily      Misc Natural Products (GLUCOSAMINE CHONDROITIN ADV PO) Take 1 tablet by mouth daily      Cholecalciferol (VITAMIN D3) 2000 UNITS CAPS Take by mouth      ibuprofen (ADVIL;MOTRIN) 800 MG tablet Take 800 mg by mouth as needed for Pain.  Coenzyme Q10 (CO Q-10) 200 MG CAPS Take 1 capsule by mouth daily. No current facility-administered medications for this visit.       No Known Allergies  Health Maintenance   Topic Date Due    Annual Wellness Visit (AWV)  2019    Lipid screen  2020    Potassium monitoring  2020    Creatinine monitoring  2020    Flu vaccine (1) 2020    PSA counseling  2021    DTaP/Tdap/Td vaccine (3 - Td) 06/15/2027    Shingles Vaccine  Completed    Pneumococcal 65+ years Vaccine  Completed    Hepatitis A vaccine  Aged Out    Hepatitis B vaccine  Aged Out    Hib vaccine  Aged Out    Meningococcal (ACWY) vaccine  Aged Out       Subjective:  Review of Systems  General:   No fever, no chills, No fatigue or weight loss  Pulmonary:    No dyspnea, no wheezing  Cardiac:    Denies recent chest pain,   GI:     No nausea or vomiting, no abdominal pain  Neuro:    No dizziness or light headedness,   Musculoskeletal:  No recent active issues  Extremities:   No edema, no obvious claudication       Objective:  Physical Exam  BP (!) 160/84   Pulse 67   Ht 6' 1\" (1.854 m)   Wt 219 lb (99.3 kg)   BMI 28.89 kg/m²   General:   Well developed, well nourished  Lungs:   Clear to auscultation  Heart:    Normal S1 S2, Slight murmur. no rubs, no gallops  Abdomen:   Soft, non tender, no organomegalies, positive bowel sounds  Extremities:   No edema, no cyanosis, good peripheral pulses  Neurological:   Awake, alert, oriented. No obvious focal deficits  Musculoskelatal:  No obvious deformities    Assessment:      Diagnosis Orders   1. Coronary artery disease involving native heart without angina pectoris, unspecified vessel or lesion type  EKG 12 lead   2. Essential hypertension     3. Familial hypercholesterolemia     cardiac fair for now   ECG in office was done today. I reviewed the ECG. No acute findings      Plan:  No follow-ups on file. As above  Continue risk factor modification and medical management  Thank you for allowing me to participate in the care of your patient. Please don't hesitate to contact me regarding any further issues related to the patient care    Orders Placed:  Orders Placed This Encounter   Procedures    EKG 12 lead     Order Specific Question:   Reason for Exam?     Answer: Other       Medications Prescribed:  No orders of the defined types were placed in this encounter. Discussed use, benefit, and side effects of prescribed medications.  All

## 2020-08-03 ENCOUNTER — TELEPHONE (OUTPATIENT)
Dept: FAMILY MEDICINE CLINIC | Age: 80
End: 2020-08-03

## 2020-08-03 NOTE — LETTER
[facility]  [address] [place] [address], [address]  Phone: [phone]  Fax: [phone]    August 3, 2020    [place] [address] [facility]  [place] [address]    Dear Kimberly Brito,    Are you enrolled in Medicare Part B? If so, you qualify for an Annual Wellness Visit, a wonderful benefit that can help you stay healthy at no cost to you. Your Annual Wellness Visit will include:   *Measurements of your height, weight and blood pressure   *Screenings for depression and mood disorders, risk of falling and other health and safety issues   *Review of medical and family history and medications   *Advice on how to improve your health and well-being   *You will be asked to complete a Health Risk Questionnaire before your visit    What are the Benefits to You? *Empowers you to improve your health and manage chronic diseases   *Review of risk factors for potential diseases to catch potentially serious health issues early   *Personalized health advice   *You receive recommendations for preventing disease, managing chronic diseases, making lifestyle changes, and health screens to detect undiagnosed disease    The Annual Wellness Visit is not an appointment for refilling your medications, discussing existing conditions or addressing new or immediate health problems. Addressing those additional items during an Annual Wellness Visit would require a longer visit and separate charges would apply. Please call the office at [phone] to schedule your Free Medicare Annual Wellness visit by video or in office. Call and schedule with our nurse practitioners Shane Mata or Maureen Cooley.     Sincerely,   Mamie Freeman MD and your [facility]

## 2020-08-11 ENCOUNTER — NURSE ONLY (OUTPATIENT)
Dept: LAB | Age: 80
End: 2020-08-11

## 2020-08-11 LAB
ALBUMIN SERPL-MCNC: 4 G/DL (ref 3.5–5.1)
ALP BLD-CCNC: 90 U/L (ref 38–126)
ALT SERPL-CCNC: 25 U/L (ref 11–66)
ANION GAP SERPL CALCULATED.3IONS-SCNC: 9 MEQ/L (ref 8–16)
AST SERPL-CCNC: 27 U/L (ref 5–40)
AVERAGE GLUCOSE: 108 MG/DL (ref 70–126)
BASOPHILS # BLD: 1.9 %
BASOPHILS ABSOLUTE: 0.1 THOU/MM3 (ref 0–0.1)
BILIRUB SERPL-MCNC: 1.4 MG/DL (ref 0.3–1.2)
BUN BLDV-MCNC: 15 MG/DL (ref 7–22)
CALCIUM SERPL-MCNC: 9.4 MG/DL (ref 8.5–10.5)
CHLORIDE BLD-SCNC: 105 MEQ/L (ref 98–111)
CHOLESTEROL, TOTAL: 169 MG/DL (ref 100–199)
CO2: 28 MEQ/L (ref 23–33)
CREAT SERPL-MCNC: 0.6 MG/DL (ref 0.4–1.2)
EOSINOPHIL # BLD: 2.6 %
EOSINOPHILS ABSOLUTE: 0.1 THOU/MM3 (ref 0–0.4)
ERYTHROCYTE [DISTWIDTH] IN BLOOD BY AUTOMATED COUNT: 12.5 % (ref 11.5–14.5)
ERYTHROCYTE [DISTWIDTH] IN BLOOD BY AUTOMATED COUNT: 47.2 FL (ref 35–45)
GFR SERPL CREATININE-BSD FRML MDRD: > 90 ML/MIN/1.73M2
GLUCOSE BLD-MCNC: 100 MG/DL (ref 70–108)
HBA1C MFR BLD: 5.6 % (ref 4.4–6.4)
HCT VFR BLD CALC: 47.9 % (ref 42–52)
HDLC SERPL-MCNC: 35 MG/DL
HEMOGLOBIN: 15.8 GM/DL (ref 14–18)
IMMATURE GRANS (ABS): 0.01 THOU/MM3 (ref 0–0.07)
IMMATURE GRANULOCYTES: 0.2 %
LDL CHOLESTEROL CALCULATED: 97 MG/DL
LYMPHOCYTES # BLD: 41.7 %
LYMPHOCYTES ABSOLUTE: 1.8 THOU/MM3 (ref 1–4.8)
MCH RBC QN AUTO: 33.3 PG (ref 26–33)
MCHC RBC AUTO-ENTMCNC: 33 GM/DL (ref 32.2–35.5)
MCV RBC AUTO: 100.8 FL (ref 80–94)
MONOCYTES # BLD: 11.7 %
MONOCYTES ABSOLUTE: 0.5 THOU/MM3 (ref 0.4–1.3)
NUCLEATED RED BLOOD CELLS: 0 /100 WBC
PLATELET # BLD: 202 THOU/MM3 (ref 130–400)
PMV BLD AUTO: 10.6 FL (ref 9.4–12.4)
POTASSIUM SERPL-SCNC: 4 MEQ/L (ref 3.5–5.2)
RBC # BLD: 4.75 MILL/MM3 (ref 4.7–6.1)
SEG NEUTROPHILS: 41.9 %
SEGMENTED NEUTROPHILS ABSOLUTE COUNT: 1.8 THOU/MM3 (ref 1.8–7.7)
SODIUM BLD-SCNC: 142 MEQ/L (ref 135–145)
TOTAL PROTEIN: 7 G/DL (ref 6.1–8)
TRIGL SERPL-MCNC: 184 MG/DL (ref 0–199)
WBC # BLD: 4.2 THOU/MM3 (ref 4.8–10.8)

## 2020-08-14 ENCOUNTER — TELEPHONE (OUTPATIENT)
Dept: FAMILY MEDICINE CLINIC | Age: 80
End: 2020-08-14

## 2020-08-14 NOTE — TELEPHONE ENCOUNTER
----- Message from Nona Madrid MD sent at 8/11/2020 12:23 PM EDT -----  Notify pt-   Results reviewed. 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 FLP ok, except TG slightly above goal of 150, HDL slightly low, and LDL slightly above goal of 70- continue max dose Lipitor. CBC ok, except mild leukopenia  Recheck CBC In 1-2 months to assure normalization.   ES

## 2020-08-25 RX ORDER — ATORVASTATIN CALCIUM 80 MG/1
TABLET, FILM COATED ORAL
Qty: 90 TABLET | Refills: 3 | Status: SHIPPED | OUTPATIENT
Start: 2020-08-25 | End: 2020-09-15 | Stop reason: SDUPTHER

## 2020-08-26 LAB — PSA, ULTRASENSITIVE: 0.55 NG/ML (ref 0–4)

## 2020-08-27 ENCOUNTER — OFFICE VISIT (OUTPATIENT)
Dept: UROLOGY | Age: 80
End: 2020-08-27
Payer: MEDICARE

## 2020-08-27 VITALS — HEIGHT: 73 IN | WEIGHT: 220 LBS | BODY MASS INDEX: 29.16 KG/M2 | TEMPERATURE: 97.2 F

## 2020-08-27 LAB
BILIRUBIN URINE: NEGATIVE
BLOOD URINE, POC: NEGATIVE
CHARACTER, URINE: CLEAR
COLOR, URINE: YELLOW
GLUCOSE URINE: NEGATIVE MG/DL
KETONES, URINE: NEGATIVE
LEUKOCYTE CLUMPS, URINE: NEGATIVE
NITRITE, URINE: NEGATIVE
PH, URINE: 5.5 (ref 5–9)
PROTEIN, URINE: NEGATIVE MG/DL
SPECIFIC GRAVITY, URINE: 1.01 (ref 1–1.03)
UROBILINOGEN, URINE: 0.2 EU/DL (ref 0–1)

## 2020-08-27 PROCEDURE — 81003 URINALYSIS AUTO W/O SCOPE: CPT | Performed by: PHYSICIAN ASSISTANT

## 2020-08-27 PROCEDURE — 1123F ACP DISCUSS/DSCN MKR DOCD: CPT | Performed by: PHYSICIAN ASSISTANT

## 2020-08-27 PROCEDURE — 99213 OFFICE O/P EST LOW 20 MIN: CPT | Performed by: PHYSICIAN ASSISTANT

## 2020-08-27 PROCEDURE — G8417 CALC BMI ABV UP PARAM F/U: HCPCS | Performed by: PHYSICIAN ASSISTANT

## 2020-08-27 PROCEDURE — 4040F PNEUMOC VAC/ADMIN/RCVD: CPT | Performed by: PHYSICIAN ASSISTANT

## 2020-08-27 PROCEDURE — 1036F TOBACCO NON-USER: CPT | Performed by: PHYSICIAN ASSISTANT

## 2020-08-27 PROCEDURE — G8427 DOCREV CUR MEDS BY ELIG CLIN: HCPCS | Performed by: PHYSICIAN ASSISTANT

## 2020-08-27 NOTE — PROGRESS NOTES
Mr. Randal Kilpatrick is a 70year-old-male was seen in follow up for BPH with obstruction. He is status post TURP with cystolitholapaxy in 9/15 for a bladder neck contracture, prostatic regrowth, and prostate stones. His pathology at this time was significant for a small focus of [place] 3+3=6 prostate cancer and acute/chronic prostatitis. He elected active surveillance and has been followed very closely with serial PSA levels. His PSA level increased from 0.51 (1/17) to 1.64 (8/17), necessitating a MRI of the pelvis in August 2017. Asymmetric enhancement of the seminal vesicles was noted, so a TRUS prostate biopsy was performed in 9/17. His pathology was significant for a small amount of [place] 3+3=6 in the left mid and left base, along with acute and chronic prostatitis. A MRI of the pelvis was obtained in March 2019 revealing post-surgical changes in the previous biopsy area and areas of BPH. It was classified as PI-RADS 2.      In regards to his urinary health, he reports rare urgency, frequency, and nocturia x 1. He reports rare weakened stream. He believes that his irritative symptomatology is exacerbated by dietary factors and stress. He has noticed significant improvement in his irritative symptomatology with Prelief before meals. He denies dysuria, gross hematuria, flank pain, fever, chills, suprapubic pain, and urinary retention. He also denies night sweats, poor appetite, unexplained weight loss, fatigue, malaise, hip or back pain. He presents today to review his PSA and for general follow-up.       Past Medical History:   Diagnosis Date    Arrhythmia     BPH (benign prostatic hyperplasia)     Dr. Tosin Cronin now    CAD (coronary artery disease)     C SILENT ISCHEMIA    Depression     Dizziness - light-headed     Erectile dysfunction     Hyperlipidemia     Hypertension     Prostate cancer ([facility] [address] 3/7/2019    Tinnitus, subjective        Past Surgical History:   Procedure Laterality Date    CARDIAC CATHETERIZATION  12/20/2006    Multivessel CAD, diffuse in nature w/ more significant being RCA, which is site of ischemia. Normal LV function. EF 55%.  CARDIOVASCULAR STRESS TEST  11-10-06    Sinus rhythm. Frequent PVC's. No V-tach, SVT, or pauses. Abnormal Holter monitor.  CARPAL TUNNEL RELEASE  1/09    bilateral-Dr. Beckman Steffany    CATARACT REMOVAL Bilateral 2015    Bilateral - staged    COLONOSCOPY      EYE SURGERY      HERNIA REPAIR  1970's    bilateral inguinal hernia    PROSTATE SURGERY      PTCA  12/06    mid RCA    ROTATOR CUFF REPAIR           ROTATOR CUFF REPAIR  07/2018    Dr Burgess Asa ECHOCARDIOGRAM  12/05/2006    Preserved LV size w/ systolic function at lower limits of normal. EF 50%. Possible bicuspal AV, consider PAULA. Mild MR and TR. No significant AS or AI at current time.  TURP  8/14/14    TURP  09/01/2015       Current Outpatient Medications on File Prior to Visit   Medication Sig Dispense Refill    atorvastatin (LIPITOR) 80 MG tablet TAKE 1 TABLET BY MOUTH ONE TIME A DAY  90 tablet 3    buPROPion (WELLBUTRIN XL) 150 MG extended release tablet TAKE 1 TABLET BY MOUTH ONCE DAILY IN THE MORNING 90 tablet 3    losartan (COZAAR) 100 MG tablet TAKE 1 TABLET BY MOUTH ONCE DAILY 90 tablet 3    amLODIPine (NORVASC) 5 MG tablet TAKE 1 TABLET BY MOUTH ONCE DAILY 90 tablet 3    clopidogrel (PLAVIX) 75 MG tablet Take 1 tablet by mouth daily 90 tablet 3    sildenafil (REVATIO) 20 MG tablet Use 3-5 tablets once daily as needed 30-60 minutes prior to intercourse. 30 tablet 11    Omega-3 Fatty Acids (FISH OIL) 1000 MG CAPS Take 2,000 mg by mouth daily       aspirin 81 MG tablet Take 81 mg by mouth daily      Misc Natural Products (GLUCOSAMINE CHONDROITIN ADV PO) Take 1 tablet by mouth daily      ibuprofen (ADVIL;MOTRIN) 800 MG tablet Take 800 mg by mouth as needed for Pain.  Coenzyme Q10 (CO Q-10) 200 MG CAPS Take 1 capsule by mouth daily.       Cholecalciferol (VITAMIN D3) 2000 UNITS CAPS Take by mouth       No current facility-administered medications on file prior to visit. No Known Allergies    Family History   Problem Relation Age of Onset    Cancer Mother     Heart Disease Father     High Cholesterol Father     Cancer Other         kidney, colon, thyroid cancers-multiple siblings    Cancer Brother        Social History     Socioeconomic History    Marital status:      Spouse name: Not on file    Number of children: Not on file    Years of education: Not on file    Highest education level: Not on file   Occupational History    Not on file   Social Needs    Financial resource strain: Not hard at all   "[employer] insecurity     Worry: Never true     Inability: Never true   [employer] needs     Medical: No     Non-medical: No   Tobacco Use    Smoking status: Former Smoker     Packs/day: 1.50     Years: 3.00     Pack years: 4.50     Types: Cigarettes     Last attempt to quit: 1967     Years since quittin.1    Smokeless tobacco: Never Used   Substance and Sexual Activity    Alcohol use: Yes     Alcohol/week: 0.0 standard drinks     Comment: socially- 2-3 beers/HS.      Drug use: No    Sexual activity: Yes     Partners: Female   Lifestyle    Physical activity     Days per week: Not on file     Minutes per session: Not on file    Stress: Not on file   Relationships    Social connections     Talks on phone: Not on file     Gets together: Not on file     Attends Judaism service: Not on file     Active member of club or organization: Not on file     Attends meetings of clubs or organizations: Not on file     Relationship status: Not on file    Intimate partner violence     Fear of current or ex partner: Not on file     Emotionally abused: Not on file     Physically abused: Not on file     Forced sexual activity: Not on file   Other Topics Concern    Not on file   Social History Narrative    Not on file       Review of Systems  No problems with ears, nose or throat. No problems with eyes. No chest pain, shortness of breath, abdominal pain, extremity pain or weakness, and no neurological deficits. No rashes. No swollen glands or lymph nodes.  symptoms per HPI. The remainder of the review of symptoms is negative.     Exam     Temp 97.2 °F (36.2 °C)   Ht 6' 1\" (1.854 m)   Wt 220 lb (99.8 kg)   BMI 29.03 kg/m²     Constitutional: Alert and oriented times 3, no acute distress and cooperative to examination with appropriate mood and affect. HENT:    NC/AT. PERRL. Neck supple. Trachea midline  Cardiovascular:    Normal rate, regular rhythm, S1 S2 heart sounds. No murmurs, rub, or gallops.    Pulmonary/Chest:  Chest symmetric with normal A/P diameter, CTA with no wheezes, rales, or rhonchi noted. Normal respiratory rate and rhthym. No use of accessory muscles. Abdominal:    Soft. No tenderness. No rebound, no guarding and no CVA tenderness. Bowel sounds present. : Prostate enlarged no obvious nodularity   Musculoskeletal:  Normal range of motion. No edema or tenderness of lower extremities. Extremities: No cyanosis, clubbing, or edema present. Neurological:    Alert and oriented. No cranial nerve deficit. There are no focalizing motor or sensory deficits. CN II-XII are grossly intact. Skin:  Skin color, texture, turgor normal. No rashes or lesions.    Psychiatric:    Normal mood and affect.      Labs    Results for POC orders placed in visit on 08/27/20   POCT Urinalysis No Micro (Auto)   Result Value Ref Range    Glucose, Ur Negative NEGATIVE mg/dl    Bilirubin Urine Negative     Ketones, Urine Negative NEGATIVE    Specific Gravity, Urine 1.010 1.002 - 1.03    Blood, UA POC Negative NEGATIVE    pH, Urine 5.50 5.0 - 9.0    Protein, Urine Negative NEGATIVE mg/dl    Urobilinogen, Urine 0.20 0.0 - 1.0 eu/dl    Nitrite, Urine Negative NEGATIVE    Leukocyte Clumps, Urine Negative NEGATIVE    Color, Urine Yellow YELLOW-STR Character, Urine Clear [id number]       Lab Results   Component Value Date    CREATININE 0.6 08/11/2020    BUN 15 08/11/2020     08/11/2020    K 4.0 08/11/2020     08/11/2020    CO2 28 08/11/2020       Lab Results   Component Value Date    PSA 0.44 01/09/2018    PSA 0.51 01/06/2017    PSA 0.48 07/13/2016         Plan:  1. Prostate cancer- [place] 3+3=6 disease. Under active surveillance. TRUS prostate biopsy in 9/17 significant for [place] 3+3=6 in left mid and left base. PSA has ranged from 1.64 to 0.44. MRI of the pelvis was obtained in March 2019 revealing post-surgical changes in the previous biopsy area and areas of BPH. It was classified as PI-RADS 2. Even though the MRI looked unremarkable, it has been over two years since his last prostate biopsy. We discussed the need for periodic prostate biopsies on active surveillance. We will hold at this time due to his stable PSA (August 2020) of 0.55. Will continue to monitor PSA levels every six months. Follow-up in six months.     2. BPH with obstruction- S/p TURP 9/15. He has very mild irritative symptomatology and does not feel the need for  treatment at this time. Patient was instructed to call immediately if his irritative or obstructive symptoms worsen.      3. Family history of renal cell carcinoma-  Stable simple 1.5 cm lesion of left kidney. Since patient has an extensive family history of renal cell carcinoma (brothers x 3) will obtain annual renal US  March 2021.

## 2020-09-15 ENCOUNTER — PATIENT MESSAGE (OUTPATIENT)
Dept: FAMILY MEDICINE CLINIC | Age: 80
End: 2020-09-15

## 2020-09-15 RX ORDER — ATORVASTATIN CALCIUM 80 MG/1
TABLET, FILM COATED ORAL
Qty: 90 TABLET | Refills: 3 | Status: SHIPPED | OUTPATIENT
Start: 2020-09-15 | End: 2021-11-15

## 2020-09-15 NOTE — TELEPHONE ENCOUNTER
From: Perlita Hidalgo  To: Coby Rivas MD  Sent: 9/15/2020 11:06 AM EDT  Subject: Prescription Question    Could you please send a prescription for Atorvastatin 80 mg to [facility] [address]ANT on [address]? I can get it for no charge at [facility] but [facility] now charges.  Thanks, [employer]

## 2020-10-27 ENCOUNTER — NURSE ONLY (OUTPATIENT)
Dept: LAB | Age: 80
End: 2020-10-27

## 2020-10-27 ENCOUNTER — TELEPHONE (OUTPATIENT)
Dept: FAMILY MEDICINE CLINIC | Age: 80
End: 2020-10-27

## 2020-10-27 LAB
BASOPHILS # BLD: 1.7 %
BASOPHILS ABSOLUTE: 0.1 THOU/MM3 (ref 0–0.1)
EOSINOPHIL # BLD: 2 %
EOSINOPHILS ABSOLUTE: 0.1 THOU/MM3 (ref 0–0.4)
ERYTHROCYTE [DISTWIDTH] IN BLOOD BY AUTOMATED COUNT: 12.2 % (ref 11.5–14.5)
ERYTHROCYTE [DISTWIDTH] IN BLOOD BY AUTOMATED COUNT: 45 FL (ref 35–45)
HCT VFR BLD CALC: 47 % (ref 42–52)
HEMOGLOBIN: 15.8 GM/DL (ref 14–18)
IMMATURE GRANS (ABS): 0 THOU/MM3 (ref 0–0.07)
IMMATURE GRANULOCYTES: 0 %
LYMPHOCYTES # BLD: 41.3 %
LYMPHOCYTES ABSOLUTE: 1.7 THOU/MM3 (ref 1–4.8)
MCH RBC QN AUTO: 33.5 PG (ref 26–33)
MCHC RBC AUTO-ENTMCNC: 33.6 GM/DL (ref 32.2–35.5)
MCV RBC AUTO: 99.6 FL (ref 80–94)
MONOCYTES # BLD: 12.5 %
MONOCYTES ABSOLUTE: 0.5 THOU/MM3 (ref 0.4–1.3)
NUCLEATED RED BLOOD CELLS: 0 /100 WBC
PLATELET # BLD: 212 THOU/MM3 (ref 130–400)
PMV BLD AUTO: 10.5 FL (ref 9.4–12.4)
RBC # BLD: 4.72 MILL/MM3 (ref 4.7–6.1)
SEG NEUTROPHILS: 42.5 %
SEGMENTED NEUTROPHILS ABSOLUTE COUNT: 1.7 THOU/MM3 (ref 1.8–7.7)
WBC # BLD: 4.1 THOU/MM3 (ref 4.8–10.8)

## 2020-10-27 NOTE — TELEPHONE ENCOUNTER
----- Message from Caity Parham MD sent at 10/27/2020 10:46 AM EDT -----  Notify pt-   Results reviewed. CBC shows stable mild leukopenia, otherwise unremarkable. Recheck CBC prior to appt in 1/2021 if not already ordered.   ES

## 2020-11-17 RX ORDER — CYCLOBENZAPRINE HCL 10 MG
TABLET ORAL
Qty: 30 TABLET | Refills: 0 | Status: SHIPPED | OUTPATIENT
Start: 2020-11-17

## 2020-11-29 RX ORDER — AMLODIPINE BESYLATE 5 MG/1
TABLET ORAL
Qty: 90 TABLET | Refills: 0 | Status: SHIPPED | OUTPATIENT
Start: 2020-11-29 | End: 2021-01-11 | Stop reason: SDUPTHER

## 2020-11-30 RX ORDER — LOSARTAN POTASSIUM 100 MG/1
TABLET ORAL
Qty: 90 TABLET | Refills: 3 | Status: SHIPPED | OUTPATIENT
Start: 2020-11-30 | End: 2022-04-04

## 2020-12-07 RX ORDER — CLOPIDOGREL BISULFATE 75 MG/1
TABLET ORAL
Qty: 90 TABLET | Refills: 2 | Status: SHIPPED | OUTPATIENT
Start: 2020-12-07 | End: 2021-09-08 | Stop reason: ALTCHOICE

## 2021-01-05 ENCOUNTER — NURSE ONLY (OUTPATIENT)
Dept: LAB | Age: 81
End: 2021-01-05

## 2021-01-05 DIAGNOSIS — D72.819 LEUKOPENIA, UNSPECIFIED TYPE: ICD-10-CM

## 2021-01-05 LAB
ERYTHROCYTE [DISTWIDTH] IN BLOOD BY AUTOMATED COUNT: 12.2 % (ref 11.5–14.5)
ERYTHROCYTE [DISTWIDTH] IN BLOOD BY AUTOMATED COUNT: 44.2 FL (ref 35–45)
HCT VFR BLD CALC: 47.5 % (ref 42–52)
HEMOGLOBIN: 16.1 GM/DL (ref 14–18)
MCH RBC QN AUTO: 33.3 PG (ref 26–33)
MCHC RBC AUTO-ENTMCNC: 33.9 GM/DL (ref 32.2–35.5)
MCV RBC AUTO: 98.3 FL (ref 80–94)
PLATELET # BLD: 218 THOU/MM3 (ref 130–400)
PMV BLD AUTO: 10.6 FL (ref 9.4–12.4)
RBC # BLD: 4.83 MILL/MM3 (ref 4.7–6.1)
WBC # BLD: 4.8 THOU/MM3 (ref 4.8–10.8)

## 2021-01-09 NOTE — PROGRESS NOTES
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 The home BP readings have been in the 130-140 / 70-80's range. Checking 3x/week at this time    Pt doing well at this time- denies any new complaints. Pt continues working every day and \"enjoying it\"     Wt Readings from Last 3 Encounters:   01/11/21 224 lb 9.6 oz (101.9 kg)   08/27/20 220 lb (99.8 kg)   07/29/20 219 lb (99.3 kg)   Weight decreased 5# since last visit 12 months ago.      Patient Active Problem List   Diagnosis    Essential hypertension    Hyperlipidemia    Arrhythmia    Tinnitus, subjective    ED (erectile dysfunction)    Depression    Family history of kidney cancer    Urinary urgency    History of adenomatous polyp of colon    Stone, prostate    Bladder stone    BPH with obstruction/lower urinary tract symptoms    Coronary artery disease involving native heart without angina pectoris    Macular degeneration- Dr. Jerrica Heart Prostate cancer [facility])     Current Outpatient Medications   Medication Sig Dispense Refill    buPROPion (WELLBUTRIN XL) 150 MG extended release tablet Take 1 tablet by mouth every morning 90 tablet 3    amLODIPine (NORVASC) 5 MG tablet Take 1 tablet by mouth daily 90 tablet 3    clopidogrel (PLAVIX) 75 MG tablet Take 1 tablet by mouth once daily 90 tablet 2    losartan (COZAAR) 100 MG tablet Take 1 tablet by mouth once daily 90 tablet 3    cyclobenzaprine (FLEXERIL) 10 MG tablet TAKE 1/2 TO 1 (ONE-HALF TO ONE) TABLET BY MOUTH THREE TIMES DAILY AS NEEDED FOR MUSCLE SPASM 30 tablet 0    atorvastatin (LIPITOR) 80 MG tablet TAKE 1 TABLET BY MOUTH ONE TIME A DAY 90 tablet 3    sildenafil (REVATIO) 20 MG tablet Use 3-5 tablets once daily as needed 30-60 minutes prior to intercourse. 30 tablet 11    Omega-3 Fatty Acids (FISH OIL) 1000 MG CAPS Take 2,000 mg by mouth daily       aspirin 81 MG tablet Take 81 mg by mouth daily      Misc Natural Products (GLUCOSAMINE CHONDROITIN ADV PO) Take 1 tablet by mouth daily      Cholecalciferol (VITAMIN D3) 2000 UNITS CAPS Take by mouth      ibuprofen (ADVIL;MOTRIN) 800 MG tablet Take 800 mg by mouth as needed for Pain.  Coenzyme Q10 (CO Q-10) 200 MG CAPS Take 1 capsule by mouth daily. No current facility-administered medications for this visit. Review of Systems   Constitutional: Negative for chills, diaphoresis, fatigue, fever and unexpected weight change. Eyes: Negative for visual disturbance. Respiratory: Negative for chest tightness and shortness of breath. Cardiovascular: Negative for chest pain, palpitations and leg swelling. Gastrointestinal: Negative for abdominal pain, anal bleeding, blood in stool, constipation, diarrhea, nausea and vomiting. Genitourinary: Negative for dysuria and hematuria. Musculoskeletal: Positive for arthralgias (right arm/ right shoulder pain with occasional tingling- no work-up desired. ). Negative for neck pain. Neurological: Negative for dizziness, light-headedness and headaches. OBJECTIVE     /82 (Site: Right Upper Arm, Position: Sitting, Cuff Size: Medium Adult)   Pulse 65   Temp 97.2 °F (36.2 °C) (Temporal)   Resp 16   Ht 6' 0.84\" (1.85 m)   Wt 224 lb 9.6 oz (101.9 kg)   BMI 29.77 kg/m²   Body mass index is 29.77 kg/m².   BP Readings from Last 3 Encounters:   01/11/21 136/82   07/29/20 (!) 140/76   03/05/20 136/74 Physical Exam  Vitals signs and nursing note reviewed. Exam conducted with a chaperone present. Constitutional:       General: He is not in acute distress. Appearance: Normal appearance. He is not ill-appearing, toxic-appearing or diaphoretic. HENT:      Head: Normocephalic and atraumatic. Right Ear: External ear normal.      Left Ear: External ear normal.      Nose: Nose normal. No rhinorrhea. Mouth/Throat:      Mouth: Mucous membranes are moist.      Pharynx: Oropharynx is clear. Eyes:      General: No scleral icterus. Right eye: No discharge. Left eye: No discharge. Extraocular Movements: Extraocular movements intact. Conjunctiva/sclera: Conjunctivae normal.      Pupils: Pupils are equal, round, and reactive to light. Neck:      Musculoskeletal: Normal range of motion. Cardiovascular:      Rate and Rhythm: Normal rate and regular rhythm. Heart sounds: Normal heart sounds. No murmur. No friction rub. No gallop. Pulmonary:      Effort: Pulmonary effort is normal. No respiratory distress. Breath sounds: Normal breath sounds. No stridor. No wheezing, rhonchi or rales. Chest:      Chest wall: No tenderness. Abdominal:      General: Abdomen is flat. Bowel sounds are normal. There is no distension. Palpations: Abdomen is soft. There is no mass. Tenderness: There is no abdominal tenderness. There is no guarding or rebound. Hernia: No hernia is present. Musculoskeletal: Normal range of motion. General: No swelling, deformity or signs of injury. Skin:     General: Skin is warm and dry. Coloration: Skin is not jaundiced or pale. Findings: No bruising, erythema, lesion or rash. Neurological:      General: No focal deficit present. Mental Status: He is alert and oriented to person, place, and time. Mental status is at baseline. Motor: No weakness.       Coordination: Coordination normal.      Gait: Gait Latest Ref Rng & Units 8/25/2020 3/3/2020 12/11/2019 9/4/2019          10:02 AM  7:56 AM  8:22 AM 11:33 AM   Prostatic Specific Ag      0.00 - 1.00 ng/mL       PSA, Ultrasensitive      0.00 - 4.00 ng/mL 0.55 0.50 0.54 0.49     Component      Latest Ref Rng & Units 12/3/2018 1/9/2018 8/3/2017 1/6/2017           8:21 AM  2:00 PM  8:15 AM  1:23 PM   Prostatic Specific Ag      0.00 - 1.00 ng/mL  0.44  0.51   PSA, Ultrasensitive      0.00 - 4.00 ng/mL 0.683  1.64      Component      Latest Ref Rng & Units 7/13/2016           4:39 PM   Prostatic Specific Ag      0.00 - 1.00 ng/mL 0.48   PSA, Ultrasensitive      0.00 - 4.00 ng/mL          Immunization History   Administered Date(s) Administered    Influenza 10/01/2009, 11/08/2012, 11/12/2013    Influenza Virus Vaccine 10/01/2009, 12/05/2014, 11/10/2015, 12/03/2018    Influenza, High Dose (Fluzone 65 yrs and older) 10/22/2020    Influenza, Quadv, IM, (6 mo and older Fluzone, Flulaval, Fluarix and 3 yrs and older Afluria) 12/08/2016    Influenza, Quadv, adjuvanted, 65 yrs +, IM, PF (Fluad) 10/22/2020    Influenza, Triv, inactivated, subunit, adjuvanted, IM (Fluad 65 yrs and older) 12/03/2018, 01/08/2020    Pneumococcal Conjugate 13-valent ([id number]) 11/10/2015    Pneumococcal Polysaccharide ([id number]) 11/15/2007    Tdap (Boostrix, Adacel) 05/01/2007, 06/15/2017    Zoster Live (Zostavax) 12/03/2009    Zoster Recombinant (Shingrix) 12/10/2018, 07/08/2019       Health Maintenance   Topic Date Due    Annual Wellness Visit (AWV)  06/18/2019    Lipid screen  08/11/2021    Potassium monitoring  08/11/2021    Creatinine monitoring  08/11/2021    PSA counseling  08/25/2021    DTaP/Tdap/Td vaccine (3 - Td) 06/15/2027    Flu vaccine  Completed    Shingles Vaccine  Completed    Pneumococcal 65+ years Vaccine  Completed    Hepatitis A vaccine  Aged Out    Hepatitis B vaccine  Aged Out    Hib vaccine  Aged Out    Meningococcal (ACWY) vaccine  Aged Out    Hepatitis C screen  Discontinued       AAA ultrasound (Male, 65-75, smoked ever) indicated at this time? COMPLETED   Right common iliac artery aneurysm measuring 1.6 cm. Ectasia of the abdominal aorta.               **This report has been created using voice recognition software.  It may contain minor errors which are inherent in voice recognition technology. **       Final report electronically signed by Dr. Noreen Malave on 5/8/2018 9:34 AM     CT Lung Screen (55-80, 30 pk-yrs, smoking or quit <15 years) indicated at this time? NO HISTORY OF PROLONGED USE, quit in 1967. Sleep Medicine referral indicated at this time (Obesity, Snoring, Daytime Somnolence, Apneic Episodes)? Pt denies any significant symptoms. Future Appointments   Date Time Provider Minerva Pendleton   1/13/2021  6:00 AM STR ULTRASOUND RM 2 STRZ US STR Radiolog   3/5/2021  8:30 AM STR ULTRASOUND RM 2 STRZ US STR Radiolog   3/18/2021 10:00 AM Rosette Alonzo PA-C [facility] [address]   8/11/2021  9:00 AM Gayle Cortez MD N [facility]   1/17/2022  8:15 AM Rc Marsh MD [address] [facility]         ASSESSMENT       Diagnosis Orders   1. Routine general medical examination at a health care facility     2. Iliac artery aneurysm, right (HCC)  US ABDOMINAL AORTA LIMITED   3. Essential hypertension  Comprehensive Metabolic Panel    T4, Free    TSH without Reflex    amLODIPine (NORVASC) 5 MG tablet   4. Hyperlipidemia, unspecified hyperlipidemia type  Lipid Panel    Comprehensive Metabolic Panel   5. Adjustment disorder with depressed mood  buPROPion (WELLBUTRIN XL) 150 MG extended release tablet   6. Prostate cancer ([facility] [address]     7. Coronary artery disease involving native heart without angina pectoris, unspecified vessel or lesion type     8. Erectile dysfunction, unspecified erectile dysfunction type     9. Elevated bilirubin  Bilirubin Total Direct & Indirect   10. IFG (impaired fasting glucose)  Hemoglobin A1C   11. Leukopenia, unspecified type  CBC Auto Differential       PLAN      Home BP's- Call if > 140/90 on a regular basis  After discussion with pt, he would like to hold on changing max dose Lipitor and instead, continue to work on diet, exercise (30 minutes, 5x/week), and weight loss for optimal cardiovascular health. (updated 1/11/2021)  Continue annual Renal Ultrasound per Urology due to family history of Kidney CA. Recheck US Abdomen for known Right Iliac Artery Aneurysm. Check T & D BILI  at this time. Check HGA1C, FLP, CMP, FREE T4/ TSH, and CBC after 8/11/2021  Continue current medicines   Refills     Follow up in 6-12 months. Preventive Health Topics:  Pt declines HEP C screening at this time due to lack of risk factors.    (updated 1/11/2021)  COLONOSCOPY done 10/15/2020 per Dr. Medina Shon- to do again in 10/2023. (updated 1/11/2021)  JOSEFA and PSA management per [facility]/ Gonzalo Juarez- to follow up 3/18/2021 (updated 1/11/2021)       Electronically signed by Yodit Falk MD on 1/11/2021 at 12:22 PM

## 2021-01-11 ENCOUNTER — OFFICE VISIT (OUTPATIENT)
Dept: FAMILY MEDICINE CLINIC | Age: 81
End: 2021-01-11
Payer: MEDICARE

## 2021-01-11 VITALS
DIASTOLIC BLOOD PRESSURE: 82 MMHG | HEIGHT: 73 IN | WEIGHT: 224.6 LBS | BODY MASS INDEX: 29.77 KG/M2 | RESPIRATION RATE: 16 BRPM | TEMPERATURE: 97.2 F | HEART RATE: 65 BPM | SYSTOLIC BLOOD PRESSURE: 136 MMHG

## 2021-01-11 DIAGNOSIS — Z00.00 ROUTINE GENERAL MEDICAL EXAMINATION AT A HEALTH CARE FACILITY: Primary | ICD-10-CM

## 2021-01-11 DIAGNOSIS — I72.3 ILIAC ARTERY ANEURYSM, RIGHT (HCC): ICD-10-CM

## 2021-01-11 DIAGNOSIS — D72.819 LEUKOPENIA, UNSPECIFIED TYPE: ICD-10-CM

## 2021-01-11 DIAGNOSIS — E78.5 HYPERLIPIDEMIA, UNSPECIFIED HYPERLIPIDEMIA TYPE: ICD-10-CM

## 2021-01-11 DIAGNOSIS — R73.01 IFG (IMPAIRED FASTING GLUCOSE): ICD-10-CM

## 2021-01-11 DIAGNOSIS — R17 ELEVATED BILIRUBIN: ICD-10-CM

## 2021-01-11 DIAGNOSIS — I10 ESSENTIAL HYPERTENSION: ICD-10-CM

## 2021-01-11 DIAGNOSIS — C61 PROSTATE CANCER (HCC): ICD-10-CM

## 2021-01-11 DIAGNOSIS — N52.9 ERECTILE DYSFUNCTION, UNSPECIFIED ERECTILE DYSFUNCTION TYPE: ICD-10-CM

## 2021-01-11 DIAGNOSIS — F43.21 ADJUSTMENT DISORDER WITH DEPRESSED MOOD: ICD-10-CM

## 2021-01-11 DIAGNOSIS — I25.10 CORONARY ARTERY DISEASE INVOLVING NATIVE HEART WITHOUT ANGINA PECTORIS, UNSPECIFIED VESSEL OR LESION TYPE: ICD-10-CM

## 2021-01-11 PROCEDURE — G0438 PPPS, INITIAL VISIT: HCPCS | Performed by: FAMILY MEDICINE

## 2021-01-11 PROCEDURE — 4040F PNEUMOC VAC/ADMIN/RCVD: CPT | Performed by: FAMILY MEDICINE

## 2021-01-11 PROCEDURE — 1123F ACP DISCUSS/DSCN MKR DOCD: CPT | Performed by: FAMILY MEDICINE

## 2021-01-11 PROCEDURE — G8482 FLU IMMUNIZE ORDER/ADMIN: HCPCS | Performed by: FAMILY MEDICINE

## 2021-01-11 RX ORDER — BUPROPION HYDROCHLORIDE 150 MG/1
150 TABLET ORAL EVERY MORNING
Qty: 90 TABLET | Refills: 3 | Status: SHIPPED | OUTPATIENT
Start: 2021-01-11 | End: 2022-08-08 | Stop reason: ALTCHOICE

## 2021-01-11 RX ORDER — AMLODIPINE BESYLATE 5 MG/1
5 TABLET ORAL DAILY
Qty: 90 TABLET | Refills: 3 | Status: SHIPPED | OUTPATIENT
Start: 2021-01-11 | End: 2022-04-04

## 2021-01-11 ASSESSMENT — LIFESTYLE VARIABLES
HOW OFTEN DO YOU HAVE SIX OR MORE DRINKS ON ONE OCCASION: 1
HAS A RELATIVE, FRIEND, DOCTOR, OR ANOTHER HEALTH PROFESSIONAL EXPRESSED CONCERN ABOUT YOUR DRINKING OR SUGGESTED YOU CUT DOWN: 0
HOW OFTEN DURING THE LAST YEAR HAVE YOU NEEDED AN ALCOHOLIC DRINK FIRST THING IN THE MORNING TO GET YOURSELF GOING AFTER A NIGHT OF HEAVY DRINKING: 0
HOW OFTEN DURING THE LAST YEAR HAVE YOU HAD A FEELING OF GUILT OR REMORSE AFTER DRINKING: 0
HOW MANY STANDARD DRINKS CONTAINING ALCOHOL DO YOU HAVE ON A TYPICAL DAY: 0
HAVE YOU OR SOMEONE ELSE BEEN INJURED AS A RESULT OF YOUR DRINKING: 0
AUDIT TOTAL SCORE: 5
HOW OFTEN DURING THE LAST YEAR HAVE YOU BEEN UNABLE TO REMEMBER WHAT HAPPENED THE NIGHT BEFORE BECAUSE YOU HAD BEEN DRINKING: 0
HOW OFTEN DURING THE LAST YEAR HAVE YOU FAILED TO DO WHAT WAS NORMALLY EXPECTED FROM YOU BECAUSE OF DRINKING: 0
AUDIT-C TOTAL SCORE: 5

## 2021-01-11 ASSESSMENT — PATIENT HEALTH QUESTIONNAIRE - PHQ9
1. LITTLE INTEREST OR PLEASURE IN DOING THINGS: 0
2. FEELING DOWN, DEPRESSED OR HOPELESS: 0
SUM OF ALL RESPONSES TO PHQ QUESTIONS 1-9: 0

## 2021-01-11 ASSESSMENT — ENCOUNTER SYMPTOMS
CONSTIPATION: 0
NAUSEA: 0
BLOOD IN STOOL: 0
DIARRHEA: 0
CHEST TIGHTNESS: 0
ABDOMINAL PAIN: 0
SHORTNESS OF BREATH: 0
ANAL BLEEDING: 0
VOMITING: 0

## 2021-01-11 NOTE — PATIENT INSTRUCTIONS
Home BP's- Call if > 140/90 on a regular basis  After discussion with pt, he would like to hold on changing max dose Lipitor and instead, continue to work on diet, exercise (30 minutes, 5x/week), and weight loss for optimal cardiovascular health. (updated 1/11/2021)  Continue annual Renal Ultrasound per Urology due to family history of Kidney CA. Recheck US Abdomen for known Right Iliac Artery Aneurysm. Check T & D BILI  at this time. Check HGA1C, FLP, CMP, FREE T4/ TSH, and CBC after 8/11/2021  Continue current medicines   Refills     Follow up in 6-12 months. Preventive Health Topics:  Pt declines HEP C screening at this time due to lack of risk factors. (updated 1/11/2021)  COLONOSCOPY done 10/15/2020 per Dr. [employer]- to do again in 10/2023. (updated 1/11/2021)  JOSEFA and PSA management per [facility]/ Henrique Leonard- to follow up 3/18/2021 (updated 1/11/2021)                    Personalized Preventive Plan for Kit Lisa - 1/11/2021  Medicare offers a range of preventive health benefits. Some of the tests and screenings are paid in full while other may be subject to a deductible, co-insurance, and/or copay. Some of these benefits include a comprehensive review of your medical history including lifestyle, illnesses that may run in your family, and various assessments and screenings as appropriate. After reviewing your medical record and screening and assessments performed today your provider may have ordered immunizations, labs, imaging, and/or referrals for you. A list of these orders (if applicable) as well as your Preventive Care list are included within your After Visit Summary for your review. Other Preventive Recommendations:    · A preventive eye exam performed by an eye specialist is recommended every 1-2 years to screen for glaucoma; cataracts, macular degeneration, and other eye disorders. · A preventive dental visit is recommended every 6 months.   · Try to get at least 150 minutes of exercise per week or 10,000 steps per day on a pedometer . · Order or download the FREE \"Exercise & Physical Activity: Your Everyday Guide\" from [employer]. Call 0-333.160.2496 or search [employer] online. · You need 9223-8528 mg of calcium and 9444-6610 IU of vitamin D per day. It is possible to meet your calcium requirement with diet alone, but a vitamin D supplement is usually necessary to meet this goal.  · When exposed to the sun, use a sunscreen that protects against both UVA and UVB radiation with an SPF of 30 or greater. Reapply every 2 to 3 hours or after sweating, drying off with a towel, or swimming. · Always wear a seat belt when traveling in a car. Always wear a helmet when riding a bicycle or motorcycle.

## 2021-01-11 NOTE — PROGRESS NOTES
Medicare Annual Wellness Visit  Name: Kory Hodge Date: 2021   MRN: [id number] Sex: Male   Age: 68 y.o. Ethnicity: Non-/Non    : 1940 Race: Unique Johns is here for Medicare AWV (1 year follow up)    Screenings for behavioral, psychosocial and functional/safety risks, and cognitive dysfunction are all negative except as indicated below. These results, as well as other patient data from the [address] form, are documented in Flowsheets linked to this Encounter. No Known Allergies      Prior to Visit Medications    Medication Sig Taking? Authorizing Provider   buPROPion (WELLBUTRIN XL) 150 MG extended release tablet Take 1 tablet by mouth every morning Yes Ian Shelton MD   amLODIPine (NORVASC) 5 MG tablet Take 1 tablet by mouth daily Yes Ian Shelton MD   clopidogrel (PLAVIX) 75 MG tablet Take 1 tablet by mouth once daily Yes Tono Guevara MD   losartan (COZAAR) 100 MG tablet Take 1 tablet by mouth once daily Yes Ian Shelton MD   cyclobenzaprine (FLEXERIL) 10 MG tablet TAKE 1/2 TO 1 (ONE-HALF TO ONE) TABLET BY MOUTH THREE TIMES DAILY AS NEEDED FOR MUSCLE SPASM Yes Ian Shelton MD   atorvastatin (LIPITOR) 80 MG tablet TAKE 1 TABLET BY MOUTH ONE TIME A DAY Yes Ian Shelton MD   sildenafil (REVATIO) 20 MG tablet Use 3-5 tablets once daily as needed 30-60 minutes prior to intercourse. Yes Coreen Mathews PA-C   Omega-3 Fatty Acids (FISH OIL) 1000 MG CAPS Take 2,000 mg by mouth daily  Yes Historical Provider, MD   aspirin 81 MG tablet Take 81 mg by mouth daily Yes Historical Provider, MD   Misc Natural Products (GLUCOSAMINE CHONDROITIN ADV PO) Take 1 tablet by mouth daily Yes Historical Provider, MD   Cholecalciferol (VITAMIN D3) 2000 UNITS CAPS Take by mouth Yes Historical Provider, MD   ibuprofen (ADVIL;MOTRIN) 800 MG tablet Take 800 mg by mouth as needed for Pain.  Yes Historical Provider, MD   Coenzyme Q10 (CO Q-10) 200 MG CAPS Take 1 capsule by mouth daily. Yes Historical Provider, MD         Past Medical History:   Diagnosis Date    Arrhythmia     BPH (benign prostatic hyperplasia)     Dr. Francisca Mullen now    CAD (coronary artery disease)     C SILENT ISCHEMIA    Depression     Dizziness - light-headed     Erectile dysfunction     Hyperlipidemia     Hypertension     Prostate cancer ([facility] [address] 3/7/2019    Tinnitus, subjective        Past Surgical History:   Procedure Laterality Date    CARDIAC CATHETERIZATION  12/20/2006    Multivessel CAD, diffuse in nature w/ more significant being RCA, which is site of ischemia. Normal LV function. EF 55%.  CARDIOVASCULAR STRESS TEST  11-10-06    Sinus rhythm. Frequent PVC's. No V-tach, SVT, or pauses. Abnormal Holter monitor.  CARPAL TUNNEL RELEASE  1/09    bilateral-Dr. Aakash Solis    CATARACT REMOVAL Bilateral 2015    Bilateral - staged    COLONOSCOPY      EYE SURGERY      HERNIA REPAIR  1970's    bilateral inguinal hernia    PROSTATE SURGERY      PTCA  12/06    mid RCA    ROTATOR CUFF REPAIR           ROTATOR CUFF REPAIR  07/2018    Dr [facility]ke [facility] ECHOCARDIOGRAM  12/05/2006    Preserved LV size w/ systolic function at lower limits of normal. EF 50%. Possible bicuspal AV, consider PAULA. Mild MR and TR. No significant AS or AI at current time.      TURP  8/14/14    TURP  09/01/2015         Family History   Problem Relation Age of Onset    Cancer Mother     Heart Disease Father     High Cholesterol Father     Cancer Other         kidney, colon, thyroid cancers-multiple siblings    Cancer Brother        CareTeam (Including outside providers/suppliers regularly involved in providing care):   Patient Care Team:  Betsey Walker MD as PCP - General (Family Medicine)  Betsey Walker MD as PCP [facility]  Hernando Persaud MD (Cardiology)  Quintin Ashton MD (Urology)    Wt Readings from Last 3 Encounters: 01/11/21 224 lb 9.6 oz (101.9 kg)   08/27/20 220 lb (99.8 kg)   07/29/20 219 lb (99.3 kg)     Vitals:    01/11/21 0806   BP: 136/82   Site: Right Upper Arm   Position: Sitting   Cuff Size: Medium Adult   Pulse: 65   Resp: 16   Temp: 97.2 °F (36.2 °C)   TempSrc: Temporal   Weight: 224 lb 9.6 oz (101.9 kg)   Height: 6' 0.84\" (1.85 m)     Body mass index is 29.77 kg/m². Based upon direct observation of the patient, evaluation of cognition reveals recent and remote memory intact. General Appearance: alert and oriented to person, place and time, well developed and well- nourished, in no acute distress  Skin: warm and dry, no rash or erythema  Head: normocephalic and atraumatic  Eyes: pupils equal, round, and reactive to light, extraocular eye movements intact, conjunctivae normal  ENT: tympanic membrane, external ear and ear canal normal bilaterally, nose without deformity, nasal mucosa and turbinates normal without polyps  Neck: supple and non-tender without mass, no thyromegaly or thyroid nodules, no cervical lymphadenopathy  Pulmonary/Chest: clear to auscultation bilaterally- no wheezes, rales or rhonchi, normal air movement, no respiratory distress  Cardiovascular: normal rate, regular rhythm, normal S1 and S2, no murmurs, rubs, clicks, or gallops, distal pulses intact, no carotid bruits  Abdomen: soft, non-tender, non-distended, normal bowel sounds, no masses or organomegaly  Extremities: no cyanosis, clubbing or edema  Musculoskeletal: normal range of motion, no joint swelling, deformity or tenderness  Neurologic: reflexes normal and symmetric, no cranial nerve deficit, gait, coordination and speech normal    Patient's complete Health Risk Assessment and screening values have been reviewed and are found in Flowsheets. The following problems were reviewed today and where indicated follow up appointments were made and/or referrals ordered.     Positive Risk Factor Screenings with Interventions: Hearing/Vision:  No exam data present  Hearing/Vision  Do you or your family notice any trouble with your hearing?: (!) Yes  Do you have difficulty driving, watching TV, or doing any of your daily activities because of your eyesight?: No  Have you had an eye exam within the past year?: Yes  Hearing/Vision Interventions:  · Hearing concerns:  patient declines any further evaluation/treatment for hearing issues, Pt has hearing aids per Dr. Leesa Cabrera- to follow up PRN    Safety:  Safety  Do you have working smoke detectors?: Yes  Have all throw rugs been removed or fastened?: Yes  Do you have non-slip mats or surfaces in all bathtubs/showers?: Yes  Do all of your stairways have a railing or banister?: Yes  Are your doorways, halls and stairs free of clutter?: (!) No  Do you always fasten your seatbelt when you are in a car?: Yes  Safety Interventions:  · Home safety tips provided  · Patient declines any further evaluation/treatment for this issue     Personalized Preventive Plan   Current Health Maintenance Status  Immunization History   Administered Date(s) Administered    Influenza 10/01/2009, 11/08/2012, 11/12/2013    Influenza Virus Vaccine 10/01/2009, 12/05/2014, 11/10/2015, 12/03/2018    Influenza, High Dose (Fluzone 65 yrs and older) 10/22/2020    Influenza, Marshia Creed, IM, (6 mo and older Fluzone, Flulaval, Fluarix and 3 yrs and older Afluria) 12/08/2016    Influenza, Quadv, adjuvanted, 65 yrs +, IM, PF (Fluad) 10/22/2020    Influenza, Triv, inactivated, subunit, adjuvanted, IM (Fluad 65 yrs and older) 12/03/2018, 01/08/2020    Pneumococcal Conjugate 13-valent ([id number]) 11/10/2015    Pneumococcal Polysaccharide ([id number]) 11/15/2007    Tdap (Boostrix, Adacel) 05/01/2007, 06/15/2017    Zoster Live (Zostavax) 12/03/2009    Zoster Recombinant (Shingrix) 12/10/2018, 07/08/2019        Health Maintenance   Topic Date Due    Annual Wellness Visit (AWV)  06/18/2019    Lipid screen  08/11/2021    Potassium monitoring  08/11/2021    Creatinine monitoring  08/11/2021    PSA counseling  08/25/2021    DTaP/Tdap/Td vaccine (3 - Td) 06/15/2027    Flu vaccine  Completed    Shingles Vaccine  Completed    Pneumococcal 65+ years Vaccine  Completed    Hepatitis A vaccine  Aged Out    Hepatitis B vaccine  Aged Out    Hib vaccine  Aged Out    Meningococcal (ACWY) vaccine  Aged Out    Hepatitis C screen  Discontinued     Recommendations for TheTakes Due: see orders and patient instructions/AVS.  . Recommended screening schedule for the next 5-10 years is provided to the patient in written form: see Patient Instructions/AVS.    Glayds Forman was seen today for medicare aw. Diagnoses and all orders for this visit:    Iliac artery aneurysm, right ([facility] [address]  -     [address]; Future    Essential hypertension  -     Comprehensive Metabolic Panel; Future  -     T4, Free; Future  -     TSH without Reflex; Future  -     amLODIPine (NORVASC) 5 MG tablet; Take 1 tablet by mouth daily    Hyperlipidemia, unspecified hyperlipidemia type  -     Lipid Panel; Future  -     Comprehensive Metabolic Panel; Future    Adjustment disorder with depressed mood  -     buPROPion (WELLBUTRIN XL) 150 MG extended release tablet; Take 1 tablet by mouth every morning    Prostate cancer ([facility] [address]    Coronary artery disease involving native heart without angina pectoris, unspecified vessel or lesion type    Erectile dysfunction, unspecified erectile dysfunction type    Elevated bilirubin  -     Bilirubin Total Direct & Indirect;  Future    IFG (impaired fasting glucose)  -     Hemoglobin A1C; Future    Leukopenia, unspecified type  -     CBC Auto Differential; Future    Routine general medical examination at a health care facility

## 2021-01-13 ENCOUNTER — TELEPHONE (OUTPATIENT)
Dept: FAMILY MEDICINE CLINIC | Age: 81
End: 2021-01-13

## 2021-01-13 ENCOUNTER — NURSE ONLY (OUTPATIENT)
Dept: LAB | Age: 81
End: 2021-01-13

## 2021-01-13 ENCOUNTER — HOSPITAL ENCOUNTER (OUTPATIENT)
Dept: ULTRASOUND IMAGING | Age: 81
Discharge: HOME | End: 2021-01-13
Payer: MEDICARE

## 2021-01-13 DIAGNOSIS — R17 ELEVATED BILIRUBIN: ICD-10-CM

## 2021-01-13 DIAGNOSIS — I72.3 ILIAC ARTERY ANEURYSM, RIGHT (HCC): ICD-10-CM

## 2021-01-13 DIAGNOSIS — I72.3 ILIAC ARTERY ANEURYSM (HCC): Primary | ICD-10-CM

## 2021-01-13 LAB
BILIRUB SERPL-MCNC: 0.9 MG/DL (ref 0.3–1.2)
BILIRUBIN DIRECT: < 0.2 MG/DL (ref 0–0.3)

## 2021-01-13 PROCEDURE — 76775 US EXAM ABDO BACK WALL LIM: CPT

## 2021-01-13 NOTE — TELEPHONE ENCOUNTER
----- Message from Sowmya Kinney MD sent at 1/13/2021 11:40 AM EST -----  Notify pt-   Results reviewed. IMPRESSION:  1. There is no sonographic evidence for abdominal aortic aneurysm. 2. There is aneurysmal dilation of the right greater than left common iliac arteries bilaterally. This is progressed when compared to prior examination dated 5/8/2018. Would recommend referral to Dr. Titi Hidalgo at this time for evaluation of enlarging bilateral Common Iliac Artery Aneurysms.   ES

## 2021-01-20 ENCOUNTER — TELEPHONE (OUTPATIENT)
Dept: CARDIOLOGY CLINIC | Age: 81
End: 2021-01-20

## 2021-01-20 NOTE — TELEPHONE ENCOUNTER
You can tell the patient it is relatively small size that I doubt alan will do anything  But seeing him for opinion is okay with me or if you want alan to look at the [address] [facility] results first he might let you know if the patient needs seen or not and save him a trip   I am okay either way   Seems relatively minor to me

## 2021-01-20 NOTE — TELEPHONE ENCOUNTER
Sparkle Meehan   Ref was placed by PCP to [facility] for iliac artery aneurysm  Do you want to see pt first or ref to [facility]?

## 2021-02-24 ENCOUNTER — OFFICE VISIT (OUTPATIENT)
Dept: CARDIOLOGY CLINIC | Age: 81
End: 2021-02-24
Payer: MEDICARE

## 2021-02-24 VITALS
DIASTOLIC BLOOD PRESSURE: 62 MMHG | BODY MASS INDEX: 30.35 KG/M2 | SYSTOLIC BLOOD PRESSURE: 138 MMHG | HEIGHT: 73 IN | HEART RATE: 80 BPM | WEIGHT: 229 LBS

## 2021-02-24 DIAGNOSIS — I72.3 ILIAC ARTERY ANEURYSM (HCC): Primary | ICD-10-CM

## 2021-02-24 PROCEDURE — 99214 OFFICE O/P EST MOD 30 MIN: CPT | Performed by: INTERNAL MEDICINE

## 2021-02-24 PROCEDURE — 1123F ACP DISCUSS/DSCN MKR DOCD: CPT | Performed by: INTERNAL MEDICINE

## 2021-02-24 PROCEDURE — G8482 FLU IMMUNIZE ORDER/ADMIN: HCPCS | Performed by: INTERNAL MEDICINE

## 2021-02-24 PROCEDURE — G8417 CALC BMI ABV UP PARAM F/U: HCPCS | Performed by: INTERNAL MEDICINE

## 2021-02-24 PROCEDURE — 4040F PNEUMOC VAC/ADMIN/RCVD: CPT | Performed by: INTERNAL MEDICINE

## 2021-02-24 PROCEDURE — 1036F TOBACCO NON-USER: CPT | Performed by: INTERNAL MEDICINE

## 2021-02-24 PROCEDURE — G8427 DOCREV CUR MEDS BY ELIG CLIN: HCPCS | Performed by: INTERNAL MEDICINE

## 2021-02-24 NOTE — PROGRESS NOTES
[address] [facility] [place] [address]  [place] [address]  Dept: [phone]  Dept Fax: [phone]  Loc: [phone]    Visit Date: 2/24/2021    Mr. Ravindra Escobar is a 68 y.o. male  who presented for:  Chief Complaint   Patient presents with    Follow-up       HPI:   HPI   67 yo M hx of HTN, HLD, CAD - s/p PCI years prior for angina who presents for evaluation of iliac artery aneurysms-- R TI 1.9 cm (2018 - 1.6 cm), L TI 2.1 cm (2018 - 1.4 cm). No other hx of aneurysms he knows of at this time. He has chronic lower back pain usually related to moving around. No groin pain. No leg or toe color changes. No temperature changes. No family hx of aneurysms. No AAA. DAPT without bleeding. No chest pain, angina, MOODY, orthopnea, PND, sob at rest, palpitations, LE edema, or syncope. Hx of smoking, but quit 50 years ago. He is on statin.        Current Outpatient Medications:     buPROPion (WELLBUTRIN XL) 150 MG extended release tablet, Take 1 tablet by mouth every morning, Disp: 90 tablet, Rfl: 3    amLODIPine (NORVASC) 5 MG tablet, Take 1 tablet by mouth daily, Disp: 90 tablet, Rfl: 3    clopidogrel (PLAVIX) 75 MG tablet, Take 1 tablet by mouth once daily, Disp: 90 tablet, Rfl: 2    losartan (COZAAR) 100 MG tablet, Take 1 tablet by mouth once daily, Disp: 90 tablet, Rfl: 3    cyclobenzaprine (FLEXERIL) 10 MG tablet, TAKE 1/2 TO 1 (ONE-HALF TO ONE) TABLET BY MOUTH THREE TIMES DAILY AS NEEDED FOR MUSCLE SPASM, Disp: 30 tablet, Rfl: 0    atorvastatin (LIPITOR) 80 MG tablet, TAKE 1 TABLET BY MOUTH ONE TIME A DAY, Disp: 90 tablet, Rfl: 3    sildenafil (REVATIO) 20 MG tablet, Use 3-5 tablets once daily as needed 30-60 minutes prior to intercourse., Disp: 30 tablet, Rfl: 11    Omega-3 Fatty Acids (FISH OIL) 1000 MG CAPS, Take 2,000 mg by mouth daily , Disp: , Rfl:     aspirin 81 MG tablet, Take 81 mg by mouth daily, Disp: , Rfl:   Misc Natural Products (GLUCOSAMINE CHONDROITIN ADV PO), Take 1 tablet by mouth daily, Disp: , Rfl:     ibuprofen (ADVIL;MOTRIN) 800 MG tablet, Take 800 mg by mouth as needed for Pain., Disp: , Rfl:     Coenzyme Q10 (CO Q-10) 200 MG CAPS, Take 1 capsule by mouth daily. , Disp: , Rfl:     Past Medical History  Keron Pennington  has a past medical history of Arrhythmia, BPH (benign prostatic hyperplasia), CAD (coronary artery disease), Depression, Dizziness - light-headed, Erectile dysfunction, Hyperlipidemia, Hypertension, Prostate cancer ([address] and Tinnitus, subjective. Social History  Keron Pennington  reports that he quit smoking about 53 years ago. His smoking use included cigarettes. He has a 4.50 pack-year smoking history. He has never used smokeless tobacco. He reports current alcohol use. He reports that he does not use drugs. Family History  Keron Pennington family history includes Cancer in his brother, mother, and another family member; Heart Disease in his father; High Cholesterol in his father. There is no family history of bicuspid aortic valve, aneurysms, heart transplant, pacemakers, defibrillators, or sudden cardiac death. Past Surgical History   Past Surgical History:   Procedure Laterality Date    CARDIAC CATHETERIZATION  12/20/2006    Multivessel CAD, diffuse in nature w/ more significant being RCA, which is site of ischemia. Normal LV function. EF 55%.  CARDIOVASCULAR STRESS TEST  11-10-06    Sinus rhythm. Frequent PVC's. No V-tach, SVT, or pauses. Abnormal Holter monitor.      CARPAL TUNNEL RELEASE  1/09    bilateral-Dr. Michael Valdes    CATARACT REMOVAL Bilateral 2015    Bilateral - staged    COLONOSCOPY      EYE SURGERY      HERNIA REPAIR  1970's    bilateral inguinal hernia    PROSTATE SURGERY      PTCA  12/06    mid RCA    ROTATOR CUFF REPAIR           ROTATOR CUFF REPAIR  07/2018    Dr Heather Ramirez ECHOCARDIOGRAM  12/05/2006 Preserved LV size w/ systolic function at lower limits of normal. EF 50%. Possible bicuspal AV, consider PAULA. Mild MR and TR. No significant AS or AI at current time.  TURP  8/14/14    TURP  09/01/2015       Review of Systems   Constitutional: Negative for chills and fever  HENT: Negative for congestion, sinus pressure, sneezing and sore throat. Eyes: Negative for pain, discharge, redness and itching. Respiratory: Negative for apnea, cough  Gastrointestinal: Negative for blood in stool, constipation, diarrhea   Endocrine: Negative for cold intolerance, heat intolerance, polydipsia. Genitourinary: Negative for dysuria, enuresis, flank pain and hematuria. Musculoskeletal: Negative for arthralgias, joint swelling and neck pain. Neurological: Negative for numbness and headaches. Psychiatric/Behavioral: Negative for agitation, confusion, decreased concentration and dysphoric mood. Objective:     /62   Pulse 80   Ht 6' 1\" (1.854 m)   Wt 229 lb (103.9 kg)   BMI 30.21 kg/m²     Wt Readings from Last 3 Encounters:   02/24/21 229 lb (103.9 kg)   01/11/21 224 lb 9.6 oz (101.9 kg)   08/27/20 220 lb (99.8 kg)     BP Readings from Last 3 Encounters:   02/24/21 138/62   01/11/21 136/82   07/29/20 (!) 140/76       Nursing note and vitals reviewed. Physical Exam   Constitutional: Oriented to person, place, and time. Appears well-developed and well-nourished. HENT:   Head: Normocephalic and atraumatic. Eyes: EOM are normal. Pupils are equal, round, and reactive to light. Neck: Normal range of motion. Neck supple. No JVD present. Cardiovascular: Normal rate, regular rhythm, normal heart sounds and intact distal pulses. No murmur heard. Pulmonary/Chest: Effort normal and breath sounds normal. No respiratory distress. No wheezes. No rales. Abdominal: Soft. Bowel sounds are normal. No distension. There is no tenderness. Musculoskeletal: Normal range of motion. No edema. Neurological: Alert and oriented to person, place, and time. No cranial nerve deficit. Coordination normal.   Skin: Skin is warm and dry. Psychiatric: Normal mood and affect. No results found for: CKTOTAL, CKMB, CKMBINDEX    Lab Results   Component Value Date    WBC 4.8 01/05/2021    RBC 4.83 01/05/2021    RBC 4.86 08/27/2015    HGB 16.1 01/05/2021    HCT 47.5 01/05/2021    MCV 98.3 01/05/2021    MCH 33.3 01/05/2021    MCHC 33.9 01/05/2021    RDW 12.6 09/02/2015     01/05/2021    MPV 10.6 01/05/2021       Lab Results   Component Value Date     08/11/2020    K 4.0 08/11/2020     08/11/2020    CO2 28 08/11/2020    BUN 15 08/11/2020    LABALBU 4.0 08/11/2020    LABALBU 4.1 03/16/2012    CREATININE 0.6 08/11/2020    CALCIUM 9.4 08/11/2020    LABGLOM >90 08/11/2020    GLUCOSE 100 08/11/2020    GLUCOSE 107 08/06/2019       Lab Results   Component Value Date    ALKPHOS 90 08/11/2020    ALT 25 08/11/2020    AST 27 08/11/2020    PROT 7.0 08/11/2020    BILITOT 0.9 01/13/2021    BILIDIR <0.2 01/13/2021    LABALBU 4.0 08/11/2020    LABALBU 4.1 03/16/2012       No results found for: MG    No results found for: INR, PROTIME      Lab Results   Component Value Date    LABA1C 5.6 08/11/2020       Lab Results   Component Value Date    TRIG 184 08/11/2020    HDL 35 08/11/2020    HDL 39 01/13/2011    LDLCALC 97 08/11/2020    LDLDIRECT 102 12/03/2018    LABVLDL 56 08/06/2019       Lab Results   Component Value Date    TSH 1.86 08/06/2019         Testing Reviewed:      I have individually reviewed the cardiac test below:    ECHO:   Results for orders placed during the hospital encounter of 05/12/16   ECHO Complete 2D W Doppler W Color        Assessment/Plan   Hx of Iliac artery aneurysms-- R TI 1.9 cm (2018 - 1.6 cm), L TI 2.1 cm (2018 - 1.4 cm).    CAD - s/p PCI years prior for angina   Preserved EF  HTN

## 2021-02-26 ENCOUNTER — TELEPHONE (OUTPATIENT)
Dept: CARDIOLOGY CLINIC | Age: 81
End: 2021-02-26

## 2021-03-05 ENCOUNTER — HOSPITAL ENCOUNTER (OUTPATIENT)
Dept: ULTRASOUND IMAGING | Age: 81
Discharge: HOME | End: 2021-03-05
Payer: MEDICARE

## 2021-03-05 DIAGNOSIS — N28.1 RENAL CYST, ACQUIRED: ICD-10-CM

## 2021-03-05 PROCEDURE — 76770 US EXAM ABDO BACK WALL COMP: CPT

## 2021-03-16 ENCOUNTER — NURSE ONLY (OUTPATIENT)
Dept: LAB | Age: 81
End: 2021-03-16

## 2021-03-16 DIAGNOSIS — C61 MALIGNANT NEOPLASM OF PROSTATE (HCC): ICD-10-CM

## 2021-03-16 LAB — PROSTATE SPECIFIC ANTIGEN: 0.71 NG/ML (ref 0–1)

## 2021-03-16 NOTE — PROGRESS NOTES
Mr. Izzy Woods  is a 68year-old-male was seen in follow up for BPH with obstruction. He is status post TURP with cystolitholapaxy in 9/15 for a bladder neck contracture, prostatic regrowth, and prostate stones. His pathology at this time was significant for a small focus of [place] 3+3=6 prostate cancer and acute/chronic prostatitis. He elected active surveillance and has been followed very closely with serial PSA levels. His PSA level increased from 0.51 (1/17) to 1.64 (8/17), necessitating a MRI of the pelvis in August 2017. Asymmetric enhancement of the seminal vesicles was noted, so a TRUS prostate biopsy was performed in 9/17. His pathology was significant for a small amount of [place] 3+3=6 in the left mid and left base, along with acute and chronic prostatitis. A MRI of the pelvis was obtained in March 2019 revealing post-surgical changes in the previous biopsy area and areas of BPH. It was classified as PI-RADS 2.      In regards to his urinary health, he reports rare urgency, frequency, and nocturia x 1. He reports rare weakened stream. He believes that his irritative symptomatology is exacerbated by dietary factors and stress. He has noticed significant improvement in his irritative symptomatology with Prelief before meals. He denies dysuria, gross hematuria, flank pain, fever, chills, suprapubic pain, and urinary retention. He also denies night sweats, poor appetite, unexplained weight loss, fatigue, malaise, hip or back pain. He presents today to review his PSA and for general follow-up.       Past Medical History:   Diagnosis Date    Arrhythmia     BPH (benign prostatic hyperplasia)     Dr. Alcira Farisa Fear now    CAD (coronary artery disease)     C SILENT ISCHEMIA    Depression     Dizziness - light-headed     Erectile dysfunction     Hyperlipidemia     Hypertension     Prostate cancer ([facility] [address] 3/7/2019    Tinnitus, subjective        Past Surgical History:   Procedure Laterality Date    CARDIAC CATHETERIZATION  12/20/2006    Multivessel CAD, diffuse in nature w/ more significant being RCA, which is site of ischemia. Normal LV function. EF 55%.  CARDIOVASCULAR STRESS TEST  11-10-06    Sinus rhythm. Frequent PVC's. No V-tach, SVT, or pauses. Abnormal Holter monitor.  CARPAL TUNNEL RELEASE  1/09    bilateral- [facility]    CATARACT REMOVAL Bilateral 2015    Bilateral - staged    COLONOSCOPY      EYE SURGERY      HERNIA REPAIR  1970's    bilateral inguinal hernia    PROSTATE SURGERY      PTCA  12/06    mid RCA    ROTATOR CUFF REPAIR           ROTATOR CUFF REPAIR  07/2018    Dr Luisa Phelan ECHOCARDIOGRAM  12/05/2006    Preserved LV size w/ systolic function at lower limits of normal. EF 50%. Possible bicuspal AV, consider PAULA. Mild MR and TR. No significant AS or AI at current time.  TURP  8/14/14    TURP  09/01/2015       Current Outpatient Medications on File Prior to Visit   Medication Sig Dispense Refill    buPROPion (WELLBUTRIN XL) 150 MG extended release tablet Take 1 tablet by mouth every morning 90 tablet 3    amLODIPine (NORVASC) 5 MG tablet Take 1 tablet by mouth daily 90 tablet 3    clopidogrel (PLAVIX) 75 MG tablet Take 1 tablet by mouth once daily 90 tablet 2    losartan (COZAAR) 100 MG tablet Take 1 tablet by mouth once daily 90 tablet 3    atorvastatin (LIPITOR) 80 MG tablet TAKE 1 TABLET BY MOUTH ONE TIME A DAY 90 tablet 3    Omega-3 Fatty Acids (FISH OIL) 1000 MG CAPS Take 2,000 mg by mouth daily       aspirin 81 MG tablet Take 81 mg by mouth daily      Misc Natural Products (GLUCOSAMINE CHONDROITIN ADV PO) Take 1 tablet by mouth daily      ibuprofen (ADVIL;MOTRIN) 800 MG tablet Take 800 mg by mouth as needed for Pain.  Coenzyme Q10 (CO Q-10) 200 MG CAPS Take 1 capsule by mouth daily.       cyclobenzaprine (FLEXERIL) 10 MG tablet TAKE 1/2 TO 1 (ONE-HALF TO ONE) TABLET BY MOUTH THREE TIMES DAILY AS NEEDED FOR MUSCLE SPASM (Patient not taking: Reported on 3/18/2021) 30 tablet 0     No current facility-administered medications on file prior to visit. No Known Allergies    Family History   Problem Relation Age of Onset    Cancer Mother     Heart Disease Father     High Cholesterol Father     Cancer Other         kidney, colon, thyroid cancers-multiple siblings    Cancer Brother        Social History     Socioeconomic History    Marital status:      Spouse name: Not on file    Number of children: Not on file    Years of education: Not on file    Highest education level: Not on file   Occupational History    Not on file   Social Needs    Financial resource strain: Not hard at all   [employer] insecurity     Worry: Never true     Inability: Never true    Transportation needs     Medical: No     Non-medical: No   Tobacco Use    Smoking status: Former Smoker     Packs/day: 1.50     Years: 3.00     Pack years: 4.50     Types: Cigarettes     Quit date: 1967     Years since quittin.7    Smokeless tobacco: Never Used   Substance and Sexual Activity    Alcohol use: Yes     Alcohol/week: 0.0 standard drinks     Comment: socially- 2-3 beers/HS.      Drug use: No    Sexual activity: Yes     Partners: Female   Lifestyle    Physical activity     Days per week: Not on file     Minutes per session: Not on file    Stress: Not on file   Relationships    Social connections     Talks on phone: Not on file     Gets together: Not on file     Attends Judaism service: Not on file     Active member of club or organization: Not on file     Attends meetings of clubs or organizations: Not on file     Relationship status: Not on file    Intimate partner violence     Fear of current or ex partner: Not on file     Emotionally abused: Not on file     Physically abused: Not on file     Forced sexual activity: Not on file   Other Topics Concern    Not on file   Social History Narrative    Not on file       Review of Systems  No problems with ears, nose or throat. No problems with eyes. No chest pain, shortness of breath, abdominal pain, extremity pain or weakness, and no neurological deficits. No rashes. No swollen glands or lymph nodes.  symptoms per HPI. The remainder of the review of symptoms is negative.     Exam    Temp 97.2 °F (36.2 °C)   Ht 6' 1\" (1.854 m)   Wt 228 lb (103.4 kg)   BMI 30.08 kg/m²      Constitutional: Alert and oriented times 3, no acute distress and cooperative to examination with appropriate mood and affect. HENT:    NC/AT. PERRL. Neck supple. Trachea midline  Cardiovascular:    Normal rate, regular rhythm, S1 S2 heart sounds. No murmurs, rub, or gallops.    Pulmonary/Chest:  Chest symmetric with normal A/P diameter, CTA with no wheezes, rales, or rhonchi noted. Normal respiratory rate and rhthym. No use of accessory muscles. Abdominal:    Soft. No tenderness. No rebound, no guarding and no CVA tenderness. Bowel sounds present. : Prostate enlarged no obvious nodularity   Musculoskeletal:  Normal range of motion. No edema or tenderness of lower extremities. Extremities: No cyanosis, clubbing, or edema present. Neurological:    Alert and oriented. No cranial nerve deficit. There are no focalizing motor or sensory deficits. CN II-XII are grossly intact. Skin:  Skin color, texture, turgor normal. No rashes or lesions.    Psychiatric:    Normal mood and affect.         Labs    Results for POC orders placed in visit on 03/18/21   POCT Urinalysis No Micro (Auto)   Result Value Ref Range    Glucose, Ur Negative NEGATIVE mg/dl    Bilirubin Urine Negative     Ketones, Urine Negative NEGATIVE    Specific Gravity, Urine >= 1.030 1.002 - 1.030    Blood, UA POC Negative NEGATIVE    pH, Urine 6.00 5.0 - 9.0    Protein, Urine Negative NEGATIVE mg/dl    Urobilinogen, Urine 0.20 0.0 - 1.0 eu/dl    Nitrite, Urine Negative NEGATIVE    Leukocyte Clumps, Urine Negative NEGATIVE    Color, Urine Yellow YELLOW-STRAW    Character, looked unremarkable, it has been over two years since his last prostate biopsy. We discussed the need for periodic prostate biopsies on active surveillance. Patient reports that he is busy with his evaluation of his newly diagnosed aneurysm and would like to hold on any additional imaging or intervention at this time. We will hold at this time due to his stable PSA. Will continue to monitor PSA levels every six months. Follow-up in six months.     2. BPH with obstruction- S/p TURP 9/15. He has very mild irritative symptomatology and does not feel the need for  treatment at this time. Patient was instructed to call immediately if his irritative or obstructive symptoms worsen.      3. Family history of renal cell carcinoma-  Left renal cyst that has increased from 1.5 cm to 2.3 cm. Gave patient the option between renal US and MRI abdomen. Patient would like to wait and have a renal US performed. Since patient has an extensive family history of renal cell carcinoma (brothers x 3) will obtain a repeat renal US in August 2021.

## 2021-03-18 ENCOUNTER — OFFICE VISIT (OUTPATIENT)
Dept: UROLOGY | Age: 81
End: 2021-03-18
Payer: MEDICARE

## 2021-03-18 VITALS — BODY MASS INDEX: 30.22 KG/M2 | WEIGHT: 228 LBS | TEMPERATURE: 97.2 F | HEIGHT: 73 IN

## 2021-03-18 DIAGNOSIS — N52.02 CORPORO-VENOUS OCCLUSIVE ERECTILE DYSFUNCTION: ICD-10-CM

## 2021-03-18 DIAGNOSIS — N28.1 RENAL CYST, ACQUIRED: Primary | ICD-10-CM

## 2021-03-18 DIAGNOSIS — C61 MALIGNANT NEOPLASM OF PROSTATE (HCC): ICD-10-CM

## 2021-03-18 LAB
BILIRUBIN URINE: NEGATIVE
BLOOD URINE, POC: NEGATIVE
CHARACTER, URINE: CLEAR
COLOR, URINE: YELLOW
GLUCOSE URINE: NEGATIVE MG/DL
KETONES, URINE: NEGATIVE
LEUKOCYTE CLUMPS, URINE: NEGATIVE
NITRITE, URINE: NEGATIVE
PH, URINE: 6 (ref 5–9)
PROTEIN, URINE: NEGATIVE MG/DL
SPECIFIC GRAVITY, URINE: >= 1.03 (ref 1–1.03)
UROBILINOGEN, URINE: 0.2 EU/DL (ref 0–1)

## 2021-03-18 PROCEDURE — 1123F ACP DISCUSS/DSCN MKR DOCD: CPT | Performed by: PHYSICIAN ASSISTANT

## 2021-03-18 PROCEDURE — G8482 FLU IMMUNIZE ORDER/ADMIN: HCPCS | Performed by: PHYSICIAN ASSISTANT

## 2021-03-18 PROCEDURE — 4040F PNEUMOC VAC/ADMIN/RCVD: CPT | Performed by: PHYSICIAN ASSISTANT

## 2021-03-18 PROCEDURE — G8427 DOCREV CUR MEDS BY ELIG CLIN: HCPCS | Performed by: PHYSICIAN ASSISTANT

## 2021-03-18 PROCEDURE — 81003 URINALYSIS AUTO W/O SCOPE: CPT | Performed by: PHYSICIAN ASSISTANT

## 2021-03-18 PROCEDURE — 1036F TOBACCO NON-USER: CPT | Performed by: PHYSICIAN ASSISTANT

## 2021-03-18 PROCEDURE — G8417 CALC BMI ABV UP PARAM F/U: HCPCS | Performed by: PHYSICIAN ASSISTANT

## 2021-03-18 PROCEDURE — 99213 OFFICE O/P EST LOW 20 MIN: CPT | Performed by: PHYSICIAN ASSISTANT

## 2021-03-18 RX ORDER — SILDENAFIL CITRATE 20 MG/1
TABLET ORAL
Qty: 30 TABLET | Refills: 11 | Status: SHIPPED | OUTPATIENT
Start: 2021-03-18

## 2021-03-30 ENCOUNTER — TELEPHONE (OUTPATIENT)
Dept: UROLOGY | Age: 81
End: 2021-03-30

## 2021-03-30 NOTE — TELEPHONE ENCOUNTER
Patient scheduled for US Renal Complete  at [facility] MR on 9/9/21 arrival of 12:15 pm for a 12:30 pm sca time with no carbonated beverages the morning of and arrive well hydrated. Patient advised of instructions.   Order mailed/given to patient

## 2021-08-04 ENCOUNTER — HOSPITAL ENCOUNTER (OUTPATIENT)
Dept: CT IMAGING | Age: 81
Discharge: HOME | End: 2021-08-04
Payer: MEDICARE

## 2021-08-04 DIAGNOSIS — I72.3 ILIAC ARTERY ANEURYSM (HCC): ICD-10-CM

## 2021-08-04 LAB — POC CREATININE WHOLE BLOOD: 0.8 MG/DL (ref 0.5–1.2)

## 2021-08-04 PROCEDURE — 6360000004 HC RX CONTRAST MEDICATION: Performed by: INTERNAL MEDICINE

## 2021-08-04 PROCEDURE — 75635 CT ANGIO ABDOMINAL ARTERIES: CPT

## 2021-08-04 PROCEDURE — 82565 ASSAY OF CREATININE: CPT

## 2021-08-04 RX ADMIN — IOPAMIDOL 100 ML: 755 INJECTION, SOLUTION INTRAVENOUS at 10:59

## 2021-08-05 ENCOUNTER — TELEPHONE (OUTPATIENT)
Dept: CARDIOLOGY CLINIC | Age: 81
End: 2021-08-05

## 2021-08-05 NOTE — TELEPHONE ENCOUNTER
Result note from CTA  ----- Message from Claude Salinas, MD sent at 8/4/2021  9:52 PM EDT -----  Mild aneurysmal iliac disease, to be monitored periodically, RF management

## 2021-08-20 ENCOUNTER — NURSE ONLY (OUTPATIENT)
Dept: LAB | Age: 81
End: 2021-08-20

## 2021-08-20 DIAGNOSIS — D72.819 LEUKOPENIA, UNSPECIFIED TYPE: ICD-10-CM

## 2021-08-20 DIAGNOSIS — R73.01 IFG (IMPAIRED FASTING GLUCOSE): ICD-10-CM

## 2021-08-20 DIAGNOSIS — I10 ESSENTIAL HYPERTENSION: ICD-10-CM

## 2021-08-20 DIAGNOSIS — E78.5 HYPERLIPIDEMIA, UNSPECIFIED HYPERLIPIDEMIA TYPE: ICD-10-CM

## 2021-08-20 LAB
ALBUMIN SERPL-MCNC: 4 G/DL (ref 3.5–5.1)
ALP BLD-CCNC: 106 U/L (ref 38–126)
ALT SERPL-CCNC: 49 U/L (ref 11–66)
ANION GAP SERPL CALCULATED.3IONS-SCNC: 6 MEQ/L (ref 8–16)
AST SERPL-CCNC: 29 U/L (ref 5–40)
AVERAGE GLUCOSE: 111 MG/DL (ref 70–126)
BASOPHILS # BLD: 1.9 %
BASOPHILS ABSOLUTE: 0.1 THOU/MM3 (ref 0–0.1)
BILIRUB SERPL-MCNC: 0.6 MG/DL (ref 0.3–1.2)
BUN BLDV-MCNC: 15 MG/DL (ref 7–22)
CALCIUM SERPL-MCNC: 9.5 MG/DL (ref 8.5–10.5)
CHLORIDE BLD-SCNC: 107 MEQ/L (ref 98–111)
CHOLESTEROL, TOTAL: 164 MG/DL (ref 100–199)
CO2: 27 MEQ/L (ref 23–33)
CREAT SERPL-MCNC: 0.5 MG/DL (ref 0.4–1.2)
EOSINOPHIL # BLD: 2.9 %
EOSINOPHILS ABSOLUTE: 0.1 THOU/MM3 (ref 0–0.4)
ERYTHROCYTE [DISTWIDTH] IN BLOOD BY AUTOMATED COUNT: 12.2 % (ref 11.5–14.5)
ERYTHROCYTE [DISTWIDTH] IN BLOOD BY AUTOMATED COUNT: 44.4 FL (ref 35–45)
GFR SERPL CREATININE-BSD FRML MDRD: > 90 ML/MIN/1.73M2
GLUCOSE BLD-MCNC: 118 MG/DL (ref 70–108)
HBA1C MFR BLD: 5.7 % (ref 4.4–6.4)
HCT VFR BLD CALC: 46 % (ref 42–52)
HDLC SERPL-MCNC: 28 MG/DL
HEMOGLOBIN: 15.1 GM/DL (ref 14–18)
IMMATURE GRANS (ABS): 0.01 THOU/MM3 (ref 0–0.07)
IMMATURE GRANULOCYTES: 0.3 %
LDL CHOLESTEROL CALCULATED: 78 MG/DL
LYMPHOCYTES # BLD: 43.7 %
LYMPHOCYTES ABSOLUTE: 1.7 THOU/MM3 (ref 1–4.8)
MCH RBC QN AUTO: 32.5 PG (ref 26–33)
MCHC RBC AUTO-ENTMCNC: 32.8 GM/DL (ref 32.2–35.5)
MCV RBC AUTO: 99.1 FL (ref 80–94)
MONOCYTES # BLD: 10.8 %
MONOCYTES ABSOLUTE: 0.4 THOU/MM3 (ref 0.4–1.3)
NUCLEATED RED BLOOD CELLS: 0 /100 WBC
PLATELET # BLD: 185 THOU/MM3 (ref 130–400)
PMV BLD AUTO: 10.5 FL (ref 9.4–12.4)
POTASSIUM SERPL-SCNC: 4 MEQ/L (ref 3.5–5.2)
RBC # BLD: 4.64 MILL/MM3 (ref 4.7–6.1)
SEG NEUTROPHILS: 40.4 %
SEGMENTED NEUTROPHILS ABSOLUTE COUNT: 1.5 THOU/MM3 (ref 1.8–7.7)
SODIUM BLD-SCNC: 140 MEQ/L (ref 135–145)
T4 FREE: 1.04 NG/DL (ref 0.93–1.76)
TOTAL PROTEIN: 6.6 G/DL (ref 6.1–8)
TRIGL SERPL-MCNC: 290 MG/DL (ref 0–199)
TSH SERPL DL<=0.05 MIU/L-ACNC: 1.77 UIU/ML (ref 0.4–4.2)
WBC # BLD: 3.8 THOU/MM3 (ref 4.8–10.8)

## 2021-08-24 ENCOUNTER — TELEPHONE (OUTPATIENT)
Dept: FAMILY MEDICINE CLINIC | Age: 81
End: 2021-08-24

## 2021-08-24 DIAGNOSIS — D72.819 LEUKOPENIA, UNSPECIFIED TYPE: ICD-10-CM

## 2021-08-24 DIAGNOSIS — I10 ESSENTIAL HYPERTENSION: Primary | ICD-10-CM

## 2021-08-24 DIAGNOSIS — E78.5 HYPERLIPIDEMIA, UNSPECIFIED HYPERLIPIDEMIA TYPE: ICD-10-CM

## 2021-08-24 NOTE — TELEPHONE ENCOUNTER
----- Message from Gianfranco Enriquez MD sent at 8/20/2021 12:39 PM EDT -----  Notify pt-   Results reviewed. TSH/free T4 okayHG A1c okayCMP okay, except elevated glucoseFLP okay, except elevated triglycerides with decreased HDL-is patient taking max dose Lipitor as prescribed? If so, please continue max dose Lipitor and continue to work diligently on diet, exercise, and weight loss. CBC okay except mild leukopenia (essentially stable for the past 5 years)Wound check TG, HDL, and CBC 1 week prior to follow-up visit that is already scheduled in January 2022.   ES

## 2021-09-07 ENCOUNTER — OFFICE VISIT (OUTPATIENT)
Dept: CARDIOLOGY CLINIC | Age: 81
End: 2021-09-07
Payer: MEDICARE

## 2021-09-07 ENCOUNTER — TELEPHONE (OUTPATIENT)
Dept: CARDIOLOGY CLINIC | Age: 81
End: 2021-09-07

## 2021-09-07 VITALS
BODY MASS INDEX: 30.64 KG/M2 | HEART RATE: 80 BPM | HEIGHT: 73 IN | DIASTOLIC BLOOD PRESSURE: 78 MMHG | SYSTOLIC BLOOD PRESSURE: 132 MMHG | WEIGHT: 231.2 LBS

## 2021-09-07 DIAGNOSIS — I10 ESSENTIAL HYPERTENSION: ICD-10-CM

## 2021-09-07 DIAGNOSIS — R06.02 SOB (SHORTNESS OF BREATH): ICD-10-CM

## 2021-09-07 DIAGNOSIS — I25.10 CORONARY ARTERY DISEASE INVOLVING NATIVE HEART WITHOUT ANGINA PECTORIS, UNSPECIFIED VESSEL OR LESION TYPE: Primary | ICD-10-CM

## 2021-09-07 PROCEDURE — 99214 OFFICE O/P EST MOD 30 MIN: CPT | Performed by: NUCLEAR MEDICINE

## 2021-09-07 PROCEDURE — 93000 ELECTROCARDIOGRAM COMPLETE: CPT | Performed by: NUCLEAR MEDICINE

## 2021-09-07 PROCEDURE — 1036F TOBACCO NON-USER: CPT | Performed by: NUCLEAR MEDICINE

## 2021-09-07 PROCEDURE — G8427 DOCREV CUR MEDS BY ELIG CLIN: HCPCS | Performed by: NUCLEAR MEDICINE

## 2021-09-07 PROCEDURE — 4040F PNEUMOC VAC/ADMIN/RCVD: CPT | Performed by: NUCLEAR MEDICINE

## 2021-09-07 PROCEDURE — G8417 CALC BMI ABV UP PARAM F/U: HCPCS | Performed by: NUCLEAR MEDICINE

## 2021-09-07 PROCEDURE — 1123F ACP DISCUSS/DSCN MKR DOCD: CPT | Performed by: NUCLEAR MEDICINE

## 2021-09-07 NOTE — PROGRESS NOTES
[address] [facility] [address]  [address]  [facility] [address]  Dept: [phone]  Dept Fax: [phone]  Loc: [phone]    Visit Date: 9/7/2021    Michelle Joe is a 68 y.o. male who presents todayfor:  Chief Complaint   Patient presents with    1 Year Follow Up    Coronary Artery Disease    Hypertension    Hyperlipidemia     Has severe PVd  No limitation   No claudication   No chest pain   No changes in breathing  Known RCA stent   Bp is stable   No dizziness  No syncope  On statins for hyperlipidemia      HPI:  HPI  Past Medical History:   Diagnosis Date    Arrhythmia     BPH (benign prostatic hyperplasia)     Dr. Tere Carranza now    CAD (coronary artery disease)     C SILENT ISCHEMIA    Depression     Dizziness - light-headed     Erectile dysfunction     Hyperlipidemia     Hypertension     Prostate cancer ([facility] [address] 3/7/2019    Tinnitus, subjective       Past Surgical History:   Procedure Laterality Date    CARDIAC CATHETERIZATION  12/20/2006    Multivessel CAD, diffuse in nature w/ more significant being RCA, which is site of ischemia. Normal LV function. EF 55%.  CARDIOVASCULAR STRESS TEST  11-10-06    Sinus rhythm. Frequent PVC's. No V-tach, SVT, or pauses. Abnormal Holter monitor.  CARPAL TUNNEL RELEASE  1/09    bilateral-Dr. Fernie Raman    CATARACT REMOVAL Bilateral 2015    Bilateral - staged    COLONOSCOPY      EYE SURGERY      HERNIA REPAIR  1970's    bilateral inguinal hernia    PROSTATE SURGERY      PTCA  12/06    mid RCA    ROTATOR CUFF REPAIR           ROTATOR CUFF REPAIR  07/2018    Dr Rose Neff ECHOCARDIOGRAM  12/05/2006    Preserved LV size w/ systolic function at lower limits of normal. EF 50%. Possible bicuspal AV, consider PAULA. Mild MR and TR. No significant AS or AI at current time.      TURP  8/14/14    TURP  09/01/2015     Family History   Problem Relation Age of Onset  Cancer Mother     Heart Disease Father     High Cholesterol Father     Cancer Other         kidney, colon, thyroid cancers-multiple siblings    Cancer Brother      Social History     Tobacco Use    Smoking status: Former Smoker     Packs/day: 1.50     Years: 3.00     Pack years: 4.50     Types: Cigarettes     Quit date: 1967     Years since quittin.2    Smokeless tobacco: Never Used   Substance Use Topics    Alcohol use: Yes     Alcohol/week: 0.0 standard drinks     Comment: socially- 2-3 beers/HS. Current Outpatient Medications   Medication Sig Dispense Refill    sildenafil (REVATIO) 20 MG tablet Use 3-5 tablets once daily as needed 30-60 minutes prior to intercourse. 30 tablet 11    buPROPion (WELLBUTRIN XL) 150 MG extended release tablet Take 1 tablet by mouth every morning 90 tablet 3    amLODIPine (NORVASC) 5 MG tablet Take 1 tablet by mouth daily 90 tablet 3    clopidogrel (PLAVIX) 75 MG tablet Take 1 tablet by mouth once daily 90 tablet 2    losartan (COZAAR) 100 MG tablet Take 1 tablet by mouth once daily 90 tablet 3    cyclobenzaprine (FLEXERIL) 10 MG tablet TAKE 1/2 TO 1 (ONE-HALF TO ONE) TABLET BY MOUTH THREE TIMES DAILY AS NEEDED FOR MUSCLE SPASM 30 tablet 0    atorvastatin (LIPITOR) 80 MG tablet TAKE 1 TABLET BY MOUTH ONE TIME A DAY 90 tablet 3    Omega-3 Fatty Acids (FISH OIL) 1000 MG CAPS Take 2,000 mg by mouth daily       aspirin 81 MG tablet Take 81 mg by mouth daily      Misc Natural Products (GLUCOSAMINE CHONDROITIN ADV PO) Take 1 tablet by mouth daily      ibuprofen (ADVIL;MOTRIN) 800 MG tablet Take 800 mg by mouth as needed for Pain.  Coenzyme Q10 (CO Q-10) 200 MG CAPS Take 1 capsule by mouth daily. No current facility-administered medications for this visit.      No Known Allergies  Health Maintenance   Topic Date Due    Flu vaccine (1) 2021    Annual Wellness Visit (AWV)  2022    PSA counseling  2022    Lipid screen 08/20/2022    Potassium monitoring  08/20/2022    Creatinine monitoring  08/20/2022    DTaP/Tdap/Td vaccine (3 - Td or Tdap) 06/15/2027    Shingles Vaccine  Completed    Pneumococcal 65+ years Vaccine  Completed    COVID-19 Vaccine  Completed    Hepatitis A vaccine  Aged Out    Hepatitis B vaccine  Aged Out    Hib vaccine  Aged Out    Meningococcal (ACWY) vaccine  Aged Out    Hepatitis C screen  Discontinued       Subjective:  Review of Systems  General:   No fever, no chills, No fatigue or weight loss  Pulmonary:    No dyspnea, no wheezing  Cardiac:    Denies recent chest pain,   GI:     No nausea or vomiting, no abdominal pain  Neuro:    No dizziness or light headedness,   Musculoskeletal:  No recent active issues  Extremities:   No edema, no obvious claudication       Objective:  Physical Exam  /78   Pulse 80   Ht 6' 1\" (1.854 m)   Wt 231 lb 3.2 oz (104.9 kg)   BMI 30.50 kg/m²   General:   Well developed, well nourished  Lungs:   Clear to auscultation  Heart:    Normal S1 S2, Slight murmur. no rubs, no gallops  Abdomen:   Soft, non tender, no organomegalies, positive bowel sounds  Extremities:   No edema, no cyanosis, good peripheral pulses  Neurological:   Awake, alert, oriented. No obvious focal deficits  Musculoskelatal:  No obvious deformities    Assessment:      Diagnosis Orders   1. Coronary artery disease involving native heart without angina pectoris, unspecified vessel or lesion type  EKG 12 lead   2. SOB (shortness of breath)  EKG 12 lead   3. Essential hypertension     as above  Cardiac fair for now   Dopes have severe PVD   ECG in office was done today. I reviewed the ECG. No acute findings    Plan:  No follow-ups on file. Consider a change from plavix to xerlato 2.5 bid for his PVD  Continue risk factor modification and medical management  Thank you for allowing me to participate in the care of your patient.  Please don't hesitate to contact me regarding any further issues related to the patient care    Orders Placed:  Orders Placed This Encounter   Procedures    EKG 12 lead     Order Specific Question:   Reason for Exam?     Answer: Other       Medications Prescribed:  No orders of the defined types were placed in this encounter. Discussed use, benefit, and side effects of prescribed medications. All patient questions answered. Pt voicedunderstanding. Instructed to continue current medications, diet and exercise. Continue risk factor modification and medical management. Patient agreed with treatment plan. Follow up as directed.     Electronically signedby Yovana Holguin MD on 9/7/2021 at 8:15 AM

## 2021-09-07 NOTE — TELEPHONE ENCOUNTER
Pt was seen in office 9-7-21 by Dr. Tariq Edgar. Dr. Tariq Edgar would like Dr. Sushma Barber to review CTA of the legs. Dr. Tariq Edgar is also asking if a low dose Xarelto would be better for pt instead of Plavix.

## 2021-09-08 RX ORDER — RIVAROXABAN 2.5 MG/1
2.5 TABLET, FILM COATED ORAL 2 TIMES DAILY
Qty: 180 TABLET | Refills: 3 | Status: SHIPPED | OUTPATIENT
Start: 2021-09-08 | End: 2021-09-14

## 2021-09-08 RX ORDER — RIVAROXABAN 2.5 MG/1
2.5 TABLET, FILM COATED ORAL 2 TIMES DAILY
COMMUNITY
End: 2021-09-08 | Stop reason: SDUPTHER

## 2021-09-09 ENCOUNTER — HOSPITAL ENCOUNTER (OUTPATIENT)
Dept: ULTRASOUND IMAGING | Age: 81
Discharge: HOME | End: 2021-09-09
Payer: MEDICARE

## 2021-09-09 DIAGNOSIS — N28.1 RENAL CYST, ACQUIRED: ICD-10-CM

## 2021-09-09 PROCEDURE — 76770 US EXAM ABDO BACK WALL COMP: CPT

## 2021-09-14 RX ORDER — CLOPIDOGREL BISULFATE 75 MG/1
75 TABLET ORAL DAILY
COMMUNITY
End: 2021-11-16 | Stop reason: SDUPTHER

## 2021-09-14 NOTE — TELEPHONE ENCOUNTER
Pt's wife called. Xarelto is >$500.00 they will continue with Plavix unless  [facility] had other suggestions. Med list updated. Please advise.

## 2021-11-12 DIAGNOSIS — E78.5 HYPERLIPIDEMIA, UNSPECIFIED HYPERLIPIDEMIA TYPE: ICD-10-CM

## 2021-11-15 RX ORDER — ATORVASTATIN CALCIUM 80 MG/1
TABLET, FILM COATED ORAL
Qty: 90 TABLET | Refills: 3 | Status: SHIPPED | OUTPATIENT
Start: 2021-11-15 | End: 2022-08-08 | Stop reason: SDUPTHER

## 2021-11-15 NOTE — TELEPHONE ENCOUNTER
This medication refill is regarding a electronic request.  Refill requested by Brian Martinez. Requested Prescriptions     Pending Prescriptions Disp Refills    atorvastatin (LIPITOR) 80 MG tablet [Pharmacy Med Name: Atorvastatin Calcium 80 MG Oral Tablet] 90 tablet 0     Sig: Take 1 tablet by mouth once daily     Date of last visit: 1/11/2021   Date of next visit: 1/3/2022  Date of last refill: 9/15/2020 #90/3    Last Lipid Panel:    Lab Results   Component Value Date    CHOL 164 08/20/2021    TRIG 290 08/20/2021    HDL 28 08/20/2021    HDL 39 01/13/2011    LDLCALC 78 08/20/2021     Last CMP:   Lab Results   Component Value Date     08/20/2021    K 4.0 08/20/2021     08/20/2021    CO2 27 08/20/2021    BUN 15 08/20/2021    CREATININE 0.5 08/20/2021    GLUCOSE 118 (H) 08/20/2021    CALCIUM 9.5 08/20/2021    PROT 6.6 08/20/2021    LABALBU 4.0 08/20/2021    BILITOT 0.6 08/20/2021    ALKPHOS 106 08/20/2021    AST 29 08/20/2021    ALT 49 08/20/2021    LABGLOM >90 08/20/2021     Rx verified, ordered and set to EP.

## 2021-11-16 RX ORDER — CLOPIDOGREL BISULFATE 75 MG/1
75 TABLET ORAL DAILY
Qty: 30 TABLET | Refills: 9 | Status: SHIPPED | OUTPATIENT
Start: 2021-11-16 | End: 2022-10-31 | Stop reason: SDUPTHER

## 2021-11-16 NOTE — TELEPHONE ENCOUNTER
Bryanna Lazcano called requesting a refill on the following medications:  Requested Prescriptions     Pending Prescriptions Disp Refills    clopidogrel (PLAVIX) 75 MG tablet 30 tablet      Sig: Take 1 tablet by mouth daily     Pharmacy verified: [facility] on [address]   [place]      Date of last visit: 9/07/2021  Date of next visit (if applicable): Visit date not found

## 2021-11-19 ENCOUNTER — TELEPHONE (OUTPATIENT)
Dept: CARDIOLOGY CLINIC | Age: 81
End: 2021-11-19

## 2021-11-19 DIAGNOSIS — I25.10 CORONARY ARTERY DISEASE INVOLVING NATIVE HEART WITHOUT ANGINA PECTORIS, UNSPECIFIED VESSEL OR LESION TYPE: ICD-10-CM

## 2021-11-19 DIAGNOSIS — I72.3 ILIAC ARTERY ANEURYSM (HCC): ICD-10-CM

## 2021-11-19 DIAGNOSIS — I10 ESSENTIAL HYPERTENSION: ICD-10-CM

## 2021-11-19 DIAGNOSIS — R06.02 SOB (SHORTNESS OF BREATH): Primary | ICD-10-CM

## 2021-11-19 NOTE — TELEPHONE ENCOUNTER
Pre op Risk Assessment    Procedure Right Reverse Total Shoulder  Physician Neal  Date of surgery/procedure 12/6/21    Last OV 9/7/21  Last Stress 6/6/18  Last Echo 5/12/16  Last Cath 2006  Last Stent 2006  Is patient on blood thinners Plavix and ASA  Hold Meds/how many days 5

## 2021-12-02 ENCOUNTER — HOSPITAL ENCOUNTER (OUTPATIENT)
Dept: NON INVASIVE DIAGNOSTICS | Age: 81
Discharge: HOME | End: 2021-12-02
Payer: MEDICARE

## 2021-12-02 VITALS — WEIGHT: 220 LBS | BODY MASS INDEX: 28.23 KG/M2 | HEIGHT: 74 IN

## 2021-12-02 DIAGNOSIS — R06.02 SOB (SHORTNESS OF BREATH): ICD-10-CM

## 2021-12-02 DIAGNOSIS — I25.10 CORONARY ARTERY DISEASE INVOLVING NATIVE HEART WITHOUT ANGINA PECTORIS, UNSPECIFIED VESSEL OR LESION TYPE: ICD-10-CM

## 2021-12-02 DIAGNOSIS — I72.3 ILIAC ARTERY ANEURYSM (HCC): ICD-10-CM

## 2021-12-02 DIAGNOSIS — I10 ESSENTIAL HYPERTENSION: ICD-10-CM

## 2021-12-02 LAB
LV EF: 60 %
LVEF MODALITY: NORMAL

## 2021-12-02 PROCEDURE — A9500 TC99M SESTAMIBI: HCPCS | Performed by: NUCLEAR MEDICINE

## 2021-12-02 PROCEDURE — 78452 HT MUSCLE IMAGE SPECT MULT: CPT | Performed by: NUCLEAR MEDICINE

## 2021-12-02 PROCEDURE — 93017 CV STRESS TEST TRACING ONLY: CPT | Performed by: NUCLEAR MEDICINE

## 2021-12-02 PROCEDURE — 78452 HT MUSCLE IMAGE SPECT MULT: CPT

## 2021-12-02 PROCEDURE — 3430000000 HC RX DIAGNOSTIC RADIOPHARMACEUTICAL: Performed by: NUCLEAR MEDICINE

## 2021-12-02 PROCEDURE — 93016 CV STRESS TEST SUPVJ ONLY: CPT | Performed by: NUCLEAR MEDICINE

## 2021-12-02 PROCEDURE — 93306 TTE W/DOPPLER COMPLETE: CPT

## 2021-12-02 PROCEDURE — 93018 CV STRESS TEST I&R ONLY: CPT | Performed by: NUCLEAR MEDICINE

## 2021-12-02 PROCEDURE — 6360000002 HC RX W HCPCS

## 2021-12-02 RX ADMIN — Medication 8.9 MILLICURIE: at 11:47

## 2021-12-02 RX ADMIN — Medication 30.1 MILLICURIE: at 12:33

## 2022-01-13 ENCOUNTER — TELEPHONE (OUTPATIENT)
Dept: FAMILY MEDICINE CLINIC | Age: 82
End: 2022-01-13

## 2022-01-13 NOTE — TELEPHONE ENCOUNTER
----- Message from Luis Cartagena sent at 1/12/2022  3:04 PM EST -----  Subject: Appointment Request    Reason for Call: Routine Medicare AWV    QUESTIONS  Type of Appointment? Established Patient  Reason for appointment request? No appointments available during search  Additional Information for Provider? Missed first appointment, would like   to schedule an annual wellness visit for after February 8th.   ---------------------------------------------------------------------------  --------------  Balwinder RABAGO  What is the best way for the office to contact you? OK to leave message on   voicemail  Preferred Call Back Phone Number? [phone]  ---------------------------------------------------------------------------  --------------  SCRIPT ANSWERS  Relationship to Patient? Other  Representative Name? Justina Laws  Additional information verified (besides Name and Date of Birth)? Address  (If the patient has Medicare as their primary insurance coverage ask this   question) Are you requesting a Medicare Annual Wellness Visit? Yes   (Is the patient requesting a pap smear with their physical exam?)? No  (Is the patient requesting their annual physical and does not need PAP or   AWV per above?)? No  Have you been diagnosed with, awaiting test results for, or told that you   are suspected of having COVID-19 (Coronavirus)? (If patient has tested   negative or was tested as a requirement for work, school, or travel and   not based on symptoms, answer no)? No  Within the past two weeks have you developed any of the following symptoms   (answer no if symptoms have been present longer than 2 weeks or began   more than 2 weeks ago)? Fever or Chills, Cough, Shortness of breath or   difficulty breathing, Loss of taste or smell, Sore throat, Nasal   congestion, Sneezing or runny nose, Fatigue or generalized body aches   (answer no if pain is specific to a body part e.g. back pain), Diarrhea,   Headache?  No  Have you had close contact

## 2022-01-19 ENCOUNTER — TELEPHONE (OUTPATIENT)
Dept: FAMILY MEDICINE CLINIC | Age: 82
End: 2022-01-19

## 2022-01-19 NOTE — TELEPHONE ENCOUNTER
----- Message from oSbia Go MD sent at 1/13/2022  7:16 AM EST -----  Notify pt-   Results reviewed. TG okay at 166/HDL low at 32-continue max dose Lipitor at this timeCBC okay, except elevated MCV-start daily B complex multivitamin. Follow-up as scheduled.   ES

## 2022-02-18 PROBLEM — I72.3 ILIAC ARTERY ANEURYSM (HCC): Status: ACTIVE | Noted: 2022-02-18

## 2022-02-21 ENCOUNTER — TELEPHONE (OUTPATIENT)
Dept: FAMILY MEDICINE CLINIC | Age: 82
End: 2022-02-21

## 2022-02-21 NOTE — TELEPHONE ENCOUNTER
Not sure I have a lot of options currently. Please apologize to patient for me for missing today's appointment due to my own medical illness. Please add name to my cancellation list in my office. Otherwise, patient okay from my standpoint to schedule AWV with WS/TS.   ES

## 2022-02-21 NOTE — TELEPHONE ENCOUNTER
----- Message from Loreto Mar sent at 2/21/2022 12:35 PM EST -----  Subject: Appointment Request    Reason for Call: Routine (Patient Request) No Script    QUESTIONS  Type of Appointment? Established Patient  Reason for appointment request? No appointments available during search  Additional Information for Provider? pt is calling to schedule his appt   and does not want to any other pcp but enrique. call pt to schedule   ---------------------------------------------------------------------------  --------------  CALL BACK INFO  What is the best way for the office to contact you? OK to leave message on   voicemail  Preferred Call Back Phone Number? [phone]  ---------------------------------------------------------------------------  --------------  SCRIPT ANSWERS  Relationship to Patient? Self  Have your symptoms changed? No  Have you been diagnosed with, awaiting test results for, or told that you   are suspected of having COVID-19 (Coronavirus)? (If patient has tested   negative or was tested as a requirement for work, school, or travel and   not based on symptoms, answer no)? No  Within the past 10 days have you developed any of the following symptoms   (answer no if symptoms have been present longer than 10 days or began   more than 10 days ago)? Fever or Chills, Cough, Shortness of breath or   difficulty breathing, Loss of taste or smell, Sore throat, Nasal   congestion, Sneezing or runny nose, Fatigue or generalized body aches   (answer no if pain is specific to a body part e.g. back pain), Diarrhea,   Headache? No  Have you had close contact with someone with COVID-19 in the last 7 days? No  (Service Expert  click yes below to proceed with [facility] synd[employer] As Usual   Scheduling)?  Yes

## 2022-02-22 NOTE — TELEPHONE ENCOUNTER
lmtcb with pt. Scheduled [facility] with [facility] for [facility] first available which is 8-4-22. Not sure if this will work but this was the soonest that I could get him un.

## 2022-04-04 DIAGNOSIS — I10 ESSENTIAL HYPERTENSION: ICD-10-CM

## 2022-04-04 RX ORDER — LOSARTAN POTASSIUM 100 MG/1
TABLET ORAL
Qty: 90 TABLET | Refills: 1 | Status: SHIPPED | OUTPATIENT
Start: 2022-04-04 | End: 2022-08-08 | Stop reason: SDUPTHER

## 2022-04-04 RX ORDER — AMLODIPINE BESYLATE 5 MG/1
TABLET ORAL
Qty: 90 TABLET | Refills: 1 | Status: SHIPPED | OUTPATIENT
Start: 2022-04-04 | End: 2022-08-08 | Stop reason: SDUPTHER

## 2022-04-04 NOTE — TELEPHONE ENCOUNTER
This medication refill is regarding a electronic request.  Refill requested by Sun Naik. Requested Prescriptions     Pending Prescriptions Disp Refills    amLODIPine (NORVASC) 5 MG tablet [Pharmacy Med Name: amLODIPine Besylate 5 MG Oral Tablet] 90 tablet 1     Sig: Take 1 tablet by mouth once daily    losartan (COZAAR) 100 MG tablet [Pharmacy Med Name: Losartan Potassium 100 MG Oral Tablet] 90 tablet 1     Sig: Take 1 tablet by mouth once daily     Date of last visit: 1/11/2021   Date of next visit: 8/4/2022  Date of last refill:    -Losartan 11/30/20 #90/3   -Amlodipine 1/11/21 #90/3    Rx verified, ordered and set to EP.

## 2022-04-21 ENCOUNTER — OFFICE VISIT (OUTPATIENT)
Dept: FAMILY MEDICINE CLINIC | Age: 82
End: 2022-04-21
Payer: MEDICARE

## 2022-04-21 VITALS
OXYGEN SATURATION: 96 % | HEIGHT: 73 IN | HEART RATE: 70 BPM | RESPIRATION RATE: 20 BRPM | WEIGHT: 229.4 LBS | TEMPERATURE: 97 F | SYSTOLIC BLOOD PRESSURE: 142 MMHG | BODY MASS INDEX: 30.4 KG/M2 | DIASTOLIC BLOOD PRESSURE: 92 MMHG

## 2022-04-21 DIAGNOSIS — I10 ESSENTIAL HYPERTENSION: ICD-10-CM

## 2022-04-21 DIAGNOSIS — H81.13 BPPV (BENIGN PAROXYSMAL POSITIONAL VERTIGO), BILATERAL: Primary | ICD-10-CM

## 2022-04-21 PROCEDURE — G8417 CALC BMI ABV UP PARAM F/U: HCPCS | Performed by: STUDENT IN AN ORGANIZED HEALTH CARE EDUCATION/TRAINING PROGRAM

## 2022-04-21 PROCEDURE — 99213 OFFICE O/P EST LOW 20 MIN: CPT | Performed by: STUDENT IN AN ORGANIZED HEALTH CARE EDUCATION/TRAINING PROGRAM

## 2022-04-21 PROCEDURE — 1036F TOBACCO NON-USER: CPT | Performed by: STUDENT IN AN ORGANIZED HEALTH CARE EDUCATION/TRAINING PROGRAM

## 2022-04-21 PROCEDURE — G8427 DOCREV CUR MEDS BY ELIG CLIN: HCPCS | Performed by: STUDENT IN AN ORGANIZED HEALTH CARE EDUCATION/TRAINING PROGRAM

## 2022-04-21 PROCEDURE — 1123F ACP DISCUSS/DSCN MKR DOCD: CPT | Performed by: STUDENT IN AN ORGANIZED HEALTH CARE EDUCATION/TRAINING PROGRAM

## 2022-04-21 PROCEDURE — 4040F PNEUMOC VAC/ADMIN/RCVD: CPT | Performed by: STUDENT IN AN ORGANIZED HEALTH CARE EDUCATION/TRAINING PROGRAM

## 2022-04-21 ASSESSMENT — ENCOUNTER SYMPTOMS
SHORTNESS OF BREATH: 0
BLOOD IN STOOL: 0
DIARRHEA: 0
CONSTIPATION: 0
VOMITING: 0
WHEEZING: 0
ABDOMINAL PAIN: 0
SORE THROAT: 0
BACK PAIN: 0
SINUS PAIN: 0
SINUS PRESSURE: 0
COUGH: 0
NAUSEA: 0

## 2022-04-21 ASSESSMENT — PATIENT HEALTH QUESTIONNAIRE - PHQ9
4. FEELING TIRED OR HAVING LITTLE ENERGY: 0
2. FEELING DOWN, DEPRESSED OR HOPELESS: 0
SUM OF ALL RESPONSES TO PHQ QUESTIONS 1-9: 0
1. LITTLE INTEREST OR PLEASURE IN DOING THINGS: 0
5. POOR APPETITE OR OVEREATING: 0
9. THOUGHTS THAT YOU WOULD BE BETTER OFF DEAD, OR OF HURTING YOURSELF: 0
SUM OF ALL RESPONSES TO PHQ QUESTIONS 1-9: 0
6. FEELING BAD ABOUT YOURSELF - OR THAT YOU ARE A FAILURE OR HAVE LET YOURSELF OR YOUR FAMILY DOWN: 0
SUM OF ALL RESPONSES TO PHQ9 QUESTIONS 1 & 2: 0
8. MOVING OR SPEAKING SO SLOWLY THAT OTHER PEOPLE COULD HAVE NOTICED. OR THE OPPOSITE, BEING SO FIGETY OR RESTLESS THAT YOU HAVE BEEN MOVING AROUND A LOT MORE THAN USUAL: 0
3. TROUBLE FALLING OR STAYING ASLEEP: 0
10. IF YOU CHECKED OFF ANY PROBLEMS, HOW DIFFICULT HAVE THESE PROBLEMS MADE IT FOR YOU TO DO YOUR WORK, TAKE CARE OF THINGS AT HOME, OR GET ALONG WITH OTHER PEOPLE: 0
SUM OF ALL RESPONSES TO PHQ QUESTIONS 1-9: 0
7. TROUBLE CONCENTRATING ON THINGS, SUCH AS READING THE NEWSPAPER OR WATCHING TELEVISION: 0
SUM OF ALL RESPONSES TO PHQ QUESTIONS 1-9: 0

## 2022-04-21 NOTE — PATIENT INSTRUCTIONS
Patient Education        Benign Paroxysmal Positional Vertigo (BPPV): Care Instructions  Your Care Instructions     Benign paroxysmal positional vertigo, also called BPPV, is an inner ear problem. It causes a spinning or whirling sensation when you move your head. This sensation is called vertigo. The vertigo usually lasts for less than a minute. People often have vertigo spells for a few days or weeks. Then the vertigo goes away. But it may come back again. The vertigo may be mild, or it may be bad enough to causeunsteadiness, nausea, and vomiting. When you move, your inner ear sends messages to the brain. This helps you keep your balance. Vertigo can happen when debris builds up in the inner ear. Thebuildup can cause the inner ear to send the wrong message to the brain. Your doctor may move you in different positions to help your vertigo get better faster. This is called the Epley maneuver. Your doctor may also prescribemedicines or exercises to help with your symptoms. Follow-up care is a key part of your treatment and safety. Be sure to make and go to all appointments, and call your doctor if you are having problems. It's also a good idea to know your test results and keep alist of the medicines you take. How can you care for yourself at home?  If your doctor suggests that you do Guevara-Daroff exercises:  ? Sit on the edge of a bed or sofa. Quickly lie down on the side that causes the worst vertigo. Lie on your side with your ear down. ? Stay in this position for at least 30 seconds or until the vertigo goes away. ? Sit up. If this causes vertigo, wait for it to stop. ? Repeat the procedure on the other side. ? Repeat this 10 times. Do these exercises 2 times a day until the vertigo is gone. When should you call for help? Call 911 anytime you think you may need emergency care. For example, call if:     You have symptoms of a stroke.  These may include:  ? Sudden numbness, tingling, weakness, or in the direction your doctor told you to. This should be toward the ear that causes the most vertigo for you. In this picture, the woman is turning toward her left ear. Step 3    1. Tilt yourself backward until you are lying on your back. Your head should still be at a 45-degree turn. Your head should be about midway between looking straight ahead and looking out to your side. Hold for 30 seconds. If you have vertigo, stay in this position until it stops. Step 4    1. Turn your head 90 degrees toward the ear that has the least vertigo. In this picture, the woman is turning to the right because she has vertigo on her left side. The point of your chin should be raised and over your shoulder. Hold for 30 seconds. Step 5    1. Roll onto the side with the least vertigo. You should now be looking at the floor. Hold for 30 seconds. Follow-up care is a key part of your treatment and safety. Be sure to make and go to all appointments, and call your doctor if you are having problems. It's also a good idea to know your test results and keep alist of the medicines you take. Where can you learn more? Go to https://[email or web address] Technology Grouppepiceweb.[email or web address]. org and sign in to your Sealed account. Enter J248 in the Campus Bubble box to learn more about \"Epley Maneuver at Home for Vertigo: Exercises. \"     If you do not have an account, please click on the \"Sign Up Now\" link. Current as of: December 13, 2021               Content Version: 13.2  © 2006-2022 Healthwise, Incorporated. Care instructions adapted under license by [facility] ([facility]). If you have questions about a medical condition or this instruction, always ask your healthcare professional. [place] [address] any warranty or liability for your use of this information. Patient Education        Vertigo: Exercises  Introduction  Here are some examples of exercises for you to try. The exercises may be suggested for a condition or for rehabilitation. Start each exercise slowly. Ease off the exercises if you start to have pain. You will be told when to start these exercises and which ones will work bestfor you. How to do the exercises  Exercise 1    1. Stand with a chair in front of you and a wall behind you. If you begin to fall, you may use them for support. 2. Stand with your feet together and your arms at your sides. 3. Move your head up and down 10 times. Exercise 2    1. Move your head side to side 10 times. Exercise 3    1. Move your head diagonally up and down 10 times. Exercise 4    1. Move your head diagonally up and down 10 times on the other side. Follow-up care is a key part of your treatment and safety. Be sure to make and go to all appointments, and call your doctor if you are having problems. It's also a good idea to know your test results and keep alist of the medicines you take. Where can you learn more? Go to https://StockLayouts.Reply.io. org and sign in to your Open Labs account. Enter F349 in the Theranos box to learn more about \"Vertigo: Exercises. \"     If you do not have an account, please click on the \"Sign Up Now\" link. Current as of: September 8, 2021               Content Version: 13.2  © 2006-2022 Healthwise, Incorporated. Care instructions adapted under license by [facility] ([facility]). If you have questions about a medical condition or this instruction, always ask your healthcare professional. [place] [address] any warranty or liability for your use of this information.

## 2022-04-21 NOTE — PROGRESS NOTES
[id number] [facility]. [address] [place] [address]  Dept: [phone]  Loc: [address] [facility] (:  1940) is a 80 y.o. male, here for evaluation of the following chief complaint(s):  Dizziness    JR precepting note. ASSESSMENT/PLAN:  1. BPPV (benign paroxysmal positional vertigo), bilateral    Positive nystagmus on Awais-Hallpike. BPPV management. SUBJECTIVE/OBJECTIVE:  HPI     Having dizziness. Has a history of HTN, CAD. Review of Systems per Dr. Bibi Valencia note    Physical Exam per Dr. Bibi Valencia note      Vitals:    22 1328 22 1331 22 1332   BP: (!) 146/92 (!) 140/102 (!) 142/92   Site: Left Upper Arm Left Upper Arm Left Upper Arm   Position: Supine Sitting Standing   Cuff Size: Medium Adult Medium Adult Medium Adult   Pulse: 70     Resp: 20     Temp: 97 °F (36.1 °C)     TempSrc: Skin     SpO2: 96%     Weight: 229 lb 6.4 oz (104.1 kg)     Height: 6' 1\" (1.854 m)           An electronic signature was used to authenticate this note.     --Anand Cobb MD

## 2022-04-21 NOTE — PROGRESS NOTES
urinating and hematuria. Musculoskeletal: Negative for arthralgias, back pain and myalgias. Neurological: Positive for dizziness and light-headedness. Negative for tremors, seizures, syncope, facial asymmetry, speech difficulty, weakness, numbness and headaches. Psychiatric/Behavioral: Negative for confusion, decreased concentration, dysphoric mood and sleep disturbance. The patient is not nervous/anxious. Objective   Physical Exam  Constitutional:       General: He is not in acute distress. Appearance: Normal appearance. He is not ill-appearing. HENT:      Head: Normocephalic and atraumatic. Right Ear: Tympanic membrane, ear canal and external ear normal. There is no impacted cerumen. Left Ear: Tympanic membrane, ear canal and external ear normal. There is no impacted cerumen. Nose: Nose normal.      Mouth/Throat:      Mouth: Mucous membranes are moist.      Pharynx: Oropharynx is clear. Eyes:      Conjunctiva/sclera: Conjunctivae normal.      Pupils: Pupils are equal, round, and reactive to light. Cardiovascular:      Rate and Rhythm: Normal rate and regular rhythm. Pulses: Normal pulses. Heart sounds: No murmur heard. Pulmonary:      Effort: Pulmonary effort is normal.      Breath sounds: Normal breath sounds. No wheezing. Abdominal:      General: Abdomen is flat. Bowel sounds are normal. There is no distension. Palpations: Abdomen is soft. Tenderness: There is no abdominal tenderness. Musculoskeletal:         General: Normal range of motion. Cervical back: Normal range of motion and neck supple. Skin:     General: Skin is warm and dry. Findings: No rash. Neurological:      General: No focal deficit present. Mental Status: He is alert and oriented to person, place, and time. Mental status is at baseline. Cranial Nerves: No cranial nerve deficit. Sensory: No sensory deficit. Motor: No weakness. Coordination: Coordination normal.      Gait: Gait normal.      Deep Tendon Reflexes: Reflexes normal.      Comments: [place]-halpike maneuver showed bilateral nystagmus   Psychiatric:         Mood and Affect: Mood normal.              An electronic signature was used to authenticate this note.     --Jeevan Pollock MD

## 2022-04-21 NOTE — PROGRESS NOTES
S: 80 y.o. male with   Chief Complaint   Patient presents with    Dizziness       HPI: please see resident note for HPI and ROS. BP Readings from Last 3 Encounters:   04/21/22 (!) 142/92   09/07/21 132/78   02/24/21 138/62     Wt Readings from Last 3 Encounters:   04/21/22 229 lb 6.4 oz (104.1 kg)   12/02/21 220 lb (99.8 kg)   09/07/21 231 lb 3.2 oz (104.9 kg)       O: VS:  height is 6' 1\" (1.854 m) and weight is 229 lb 6.4 oz (104.1 kg). His skin temperature is 97 °F (36.1 °C). His blood pressure is 142/92 (abnormal) and his pulse is 70. His respiration is 20 and oxygen saturation is 96%. Diagnosis Orders   1. BPPV (benign paroxysmal positional vertigo), bilateral         Plan:  epley maneuver instructions provided. Health Maintenance Due   Topic Date Due    Depression Monitoring  01/11/2022    Annual Wellness Visit (AWV)  01/12/2022    Prostate Specific Antigen (PSA) Screening or Monitoring  03/16/2022       Attending Physician Statement  I have discussed the case, including pertinent history and exam findings with the resident. I agree with the documented assessment and plan as documented by the resident.         Titi De La O DO 4/21/2022 2:20 PM

## 2022-05-17 DIAGNOSIS — C61 MALIGNANT NEOPLASM OF PROSTATE (HCC): Primary | ICD-10-CM

## 2022-05-27 ENCOUNTER — NURSE ONLY (OUTPATIENT)
Dept: LAB | Age: 82
End: 2022-05-27

## 2022-05-27 DIAGNOSIS — C61 MALIGNANT NEOPLASM OF PROSTATE (HCC): ICD-10-CM

## 2022-05-27 LAB — PROSTATE SPECIFIC ANTIGEN: 0.75 NG/ML (ref 0–1)

## 2022-06-01 NOTE — PROGRESS NOTES
[address] [facility] [id number]  Dept: [phone]  Loc: [phone]      Mr. Verner Balboa was seen in follow up for   Chief Complaint   Patient presents with    Follow-up     renal cyst, PSA prior         HPI:  Mr. Stefani Candelario a 80year-old-male was seen in follow up for BPH with obstruction. He is status post TURP with cystolitholapaxy in 9/15 for a bladder neck contracture, prostatic regrowth, and prostate stones. His pathology at this time was significant for a small focus of [place] 3+3=6 prostate cancer and acute/chronic prostatitis. He elected active surveillance and has been followed very closely with serial PSA levels. His PSA level increased from 0.51 (1/17) to 1.64 (8/17), necessitating a MRI of the pelvis in August 2017. Asymmetric enhancement of the seminal vesicles was noted, so a TRUS prostate biopsy was performed in 9/17. His pathology was significant for a small amount of Miles 3+3=6 in the left mid and left base, along with acute and chronic prostatitis. A MRI of the pelvis was obtained in March 2019 revealing post-surgical changes in the previous biopsy area and areas of BPH. It was classified as PI-RADS 2.      In regards to his urinary health, he reports rare urgency, frequency, and nocturia x 1. He reports rare weakened stream. He believes that his irritative symptomatology is exacerbated by dietary factors and stress. He denies dysuria, gross hematuria, flank pain, fever, chills, suprapubic pain, and urinary retention. He also denies night sweats, poor appetite, unexplained weight loss, fatigue, malaise, hip or back pain. He presents today to review his PSA and for general follow-up.     Past Medical History:   Diagnosis Date    Arrhythmia     BPH (benign prostatic hyperplasia)     Dr. Curtis Babin now    CAD (coronary artery disease)     C SILENT ISCHEMIA    Depression     Dizziness - light-headed     Erectile dysfunction     Hyperlipidemia     Hypertension     Prostate cancer ([facility] [address] 3/7/2019    Tinnitus, subjective        Past Surgical History:   Procedure Laterality Date    CARDIAC CATHETERIZATION  12/20/2006    Multivessel CAD, diffuse in nature w/ more significant being RCA, which is site of ischemia. Normal LV function. EF 55%.  CARDIOVASCULAR STRESS TEST  11-10-06    Sinus rhythm. Frequent PVC's. No V-tach, SVT, or pauses. Abnormal Holter monitor.  CARPAL TUNNEL RELEASE  1/09    bilateral-Dr. Tram Balderas    CATARACT REMOVAL Bilateral 2015    Bilateral - staged    COLONOSCOPY      EYE SURGERY      HERNIA REPAIR  1970's    bilateral inguinal hernia    PROSTATE SURGERY      PTCA  12/06    mid RCA    ROTATOR CUFF REPAIR           ROTATOR CUFF REPAIR  07/2018    Dr Orly Mckee ECHOCARDIOGRAM  12/05/2006    Preserved LV size w/ systolic function at lower limits of normal. EF 50%. Possible bicuspal AV, consider PAULA. Mild MR and TR. No significant AS or AI at current time.  TURP  8/14/14    TURP  09/01/2015       Current Outpatient Medications on File Prior to Visit   Medication Sig Dispense Refill    amLODIPine (NORVASC) 5 MG tablet Take 1 tablet by mouth once daily 90 tablet 1    losartan (COZAAR) 100 MG tablet Take 1 tablet by mouth once daily 90 tablet 1    clopidogrel (PLAVIX) 75 MG tablet Take 1 tablet by mouth daily 30 tablet 9    atorvastatin (LIPITOR) 80 MG tablet Take 1 tablet by mouth once daily 90 tablet 3    sildenafil (REVATIO) 20 MG tablet Use 3-5 tablets once daily as needed 30-60 minutes prior to intercourse.  30 tablet 11    buPROPion (WELLBUTRIN XL) 150 MG extended release tablet Take 1 tablet by mouth every morning 90 tablet 3    cyclobenzaprine (FLEXERIL) 10 MG tablet TAKE 1/2 TO 1 (ONE-HALF TO ONE) TABLET BY MOUTH THREE TIMES DAILY AS NEEDED FOR MUSCLE SPASM 30 tablet 0    Omega-3 Fatty Acids (FISH OIL) 1000 MG CAPS Take 2,000 mg by mouth daily       aspirin 81 MG tablet Take 81 mg by mouth daily      Misc Natural Products (GLUCOSAMINE CHONDROITIN ADV PO) Take 1 tablet by mouth daily      ibuprofen (ADVIL;MOTRIN) 800 MG tablet Take 800 mg by mouth as needed for Pain.  Coenzyme Q10 (CO Q-10) 200 MG CAPS Take 1 capsule by mouth daily. No current facility-administered medications on file prior to visit. No Known Allergies    Family History   Problem Relation Age of Onset    Cancer Mother     Heart Disease Father     High Cholesterol Father     Cancer Other         kidney, colon, thyroid cancers-multiple siblings    Cancer Brother        Social History     Socioeconomic History    Marital status:      Spouse name: Not on file    Number of children: Not on file    Years of education: Not on file    Highest education level: Not on file   Occupational History    Not on file   Tobacco Use    Smoking status: Former Smoker     Packs/day: 1.50     Years: 3.00     Pack years: 4.50     Types: Cigarettes     Quit date: 1967     Years since quittin.9    Smokeless tobacco: Never Used   Vaping Use    Vaping Use: Never used   Substance and Sexual Activity    Alcohol use: Yes     Alcohol/week: 0.0 standard drinks     Comment: socially- 2-3 beers/HS.  Drug use: No    Sexual activity: Yes     Partners: Female   Other Topics Concern    Not on file   Social History Narrative    Not on file     Social Determinants of Health     Financial Resource Strain: Low Risk     Difficulty of Paying Living Expenses: Not hard at all   Food Insecurity: No Food Insecurity    Worried About Running Out of Food in the Last Year: Never true    [address] in the Last Year: Never true   Transportation Needs:     Lack of Transportation (Medical): Not on file    Lack of Transportation (Non-Medical):  Not on file   Physical Activity:     Days of Exercise per Week: Not on file    Minutes of Exercise per Session: Not on file   Stress:     Feeling of Stress : Not on file   Social Connections:     Frequency of Communication with Friends and Family: Not on file    Frequency of Social Gatherings with Friends and Family: Not on file    Attends Spiritism Services: Not on file    Active Member of Clubs or Organizations: Not on file    Attends Club or Organization Meetings: Not on file    Marital Status: Not on file   Intimate Partner Violence:     Fear of Current or Ex-Partner: Not on file    Emotionally Abused: Not on file    Physically Abused: Not on file    Sexually Abused: Not on file   Housing Stability:     Unable to Pay for Housing in the Last Year: Not on file    Number of [place] in the Last Year: Not on file    Unstable Housing in the Last Year: Not on file       Review of Systems  Constitutional: Negative for fatigue, fever and unexpected weight change. HENT: Negative for congestion and trouble swallowing. Eyes: Negative for pain and itching. Respiratory: Negative for cough and shortness of breath. Cardiovascular: Negative for chest pain and leg swelling. Gastrointestinal: Negative for abdominal pain, constipation, diarrhea and nausea. Endocrine: Negative for cold intolerance and heat intolerance. Genitourinary: See HPI. Musculoskeletal: Negative for back pain and joint swelling. Skin: Negative for rash. Neurological: Negative for dizziness, weakness, numbness and headaches. Psychiatric/Behavioral: The patient is not nervous/anxious. Exam    /84   Ht 6' 1\" (1.854 m)   Wt 229 lb (103.9 kg)   BMI 30.21 kg/m²     Constitutional: Alert and oriented times 3, no acute distress and cooperative to examination with appropriate mood and affect. HENT:    NC/AT. PERRL. Neck supple. Trachea midline  Cardiovascular:    Normal rate, regular rhythm, S1 S2 heart sounds.  No murmurs, rub, or gallops.    Pulmonary/Chest:  Chest symmetric with normal A/P diameter, CTA with no wheezes, rales, or rhonchi noted. Normal respiratory rate and rhthym. No use of accessory muscles. Abdominal:    Soft. No tenderness. No rebound, no guarding and no CVA tenderness. Bowel sounds present. : Prostate enlarged no obvious nodularity   Musculoskeletal:  Normal range of motion. No edema or tenderness of lower extremities. Extremities: No cyanosis, clubbing, or edema present. Neurological:    Alert and oriented. No cranial nerve deficit. There are no focalizing motor or sensory deficits. CN II-XII are grossly intact. Skin:  Skin color, texture, turgor normal. No rashes or lesions. Psychiatric:    Normal mood and affect.     Labs    Results for POC orders placed in visit on 06/06/22   POCT Urinalysis No Micro (Auto)   Result Value Ref Range    Glucose, Ur Negative NEGATIVE mg/dl    Bilirubin Urine Negative     Ketones, Urine Negative NEGATIVE    Specific Gravity, Urine 1.015 1.002 - 1.030    Blood, UA POC Negative NEGATIVE    pH, Urine 6.00 5.0 - 9.0    Protein, Urine Negative NEGATIVE mg/dl    Urobilinogen, Urine 0.20 0.0 - 1.0 eu/dl    Nitrite, Urine Negative NEGATIVE    Leukocyte Clumps, Urine Negative NEGATIVE    Color, Urine Yellow [id number]    Character, Urine Clear [id number]       Lab Results   Component Value Date    CREATININE 0.5 08/20/2021    BUN 15 08/20/2021     08/20/2021    K 4.0 08/20/2021     08/20/2021    CO2 27 08/20/2021       Lab Results   Component Value Date    PSA 0.75 05/27/2022    PSA 0.71 03/16/2021    PSA 0.44 01/09/2018         Assessment/Plan:    1. Prostate cancer- Miles 3+3=6 disease. Under active surveillance. TRUS prostate biopsy in 9/17 significant for Miles 3+3=6 in left mid and left base. PSA has ranged from 0.44 to 1.64. Currently, his PSA is 0.75. MRI of the pelvis was obtained in March 2019 revealing post-surgical changes in the previous biopsy area and areas of BPH. It was classified as PI-RADS 2.  Even though the MRI looked unremarkable, it has been approximately three years since his last prostate biopsy. We discussed the need for periodic prostate biopsies on active surveillance. He agreed to obtaining a MRI of the prostate with and without contrast in preparation for upcoming tissue biopsies. Will call patient with MRI results and discuss next steps.      2. BPH with obstruction- S/p TURP 9/15. He has very mild irritative symptomatology and does not feel the need for  treatment at this time. Patient was instructed to call immediately if his irritative or obstructive symptoms worsen.      3. Family history of renal cell carcinoma/Complex Left Renal Cyst-  Left renal cyst that is approximately 19 mm. Will have renal US performed in September 2022 for left slightly complex renal cyst. Patient has an extensive family history of renal cell carcinoma (brothers x 3).

## 2022-06-06 ENCOUNTER — OFFICE VISIT (OUTPATIENT)
Dept: UROLOGY | Age: 82
End: 2022-06-06
Payer: MEDICARE

## 2022-06-06 ENCOUNTER — TELEPHONE (OUTPATIENT)
Dept: UROLOGY | Age: 82
End: 2022-06-06

## 2022-06-06 VITALS
DIASTOLIC BLOOD PRESSURE: 84 MMHG | SYSTOLIC BLOOD PRESSURE: 130 MMHG | WEIGHT: 229 LBS | HEIGHT: 73 IN | BODY MASS INDEX: 30.35 KG/M2

## 2022-06-06 DIAGNOSIS — N28.1 RENAL CYST, ACQUIRED: Primary | ICD-10-CM

## 2022-06-06 DIAGNOSIS — C61 PROSTATE CANCER (HCC): ICD-10-CM

## 2022-06-06 LAB
BILIRUBIN URINE: NEGATIVE
BLOOD URINE, POC: NEGATIVE
CHARACTER, URINE: CLEAR
COLOR, URINE: YELLOW
GLUCOSE URINE: NEGATIVE MG/DL
KETONES, URINE: NEGATIVE
LEUKOCYTE CLUMPS, URINE: NEGATIVE
NITRITE, URINE: NEGATIVE
PH, URINE: 6 (ref 5–9)
PROTEIN, URINE: NEGATIVE MG/DL
SPECIFIC GRAVITY, URINE: 1.01 (ref 1–1.03)
UROBILINOGEN, URINE: 0.2 EU/DL (ref 0–1)

## 2022-06-06 PROCEDURE — 99213 OFFICE O/P EST LOW 20 MIN: CPT | Performed by: PHYSICIAN ASSISTANT

## 2022-06-06 PROCEDURE — 1036F TOBACCO NON-USER: CPT | Performed by: PHYSICIAN ASSISTANT

## 2022-06-06 PROCEDURE — 1123F ACP DISCUSS/DSCN MKR DOCD: CPT | Performed by: PHYSICIAN ASSISTANT

## 2022-06-06 PROCEDURE — 81003 URINALYSIS AUTO W/O SCOPE: CPT | Performed by: PHYSICIAN ASSISTANT

## 2022-06-06 PROCEDURE — G8428 CUR MEDS NOT DOCUMENT: HCPCS | Performed by: PHYSICIAN ASSISTANT

## 2022-06-06 PROCEDURE — G8417 CALC BMI ABV UP PARAM F/U: HCPCS | Performed by: PHYSICIAN ASSISTANT

## 2022-06-06 NOTE — TELEPHONE ENCOUNTER
Patient scheduled for mri prostate w wo cont   At [facility]  on [address]  [address] at 10 am arrive [facility] [facility]. Order mailed with instructions or given to the patient in the office.

## 2022-06-06 NOTE — TELEPHONE ENCOUNTER
Called patient and left message with the appointment time and date of his MRI. Patient is to arrive at [facility] on 6/15/22 @ 9:30am for a 10:00am scan.

## 2022-06-13 NOTE — TELEPHONE ENCOUNTER
Patient scheduled for US RENAL COMPLETE  at [facility] MR on 6/15/2022 ARRIVAL OF 315PM FOR A 330PM SCAN. ORDER IN Epic; CALLED PATIENT; PATIENT VOICED UNDERSTANDING.

## 2022-06-15 ENCOUNTER — HOSPITAL ENCOUNTER (OUTPATIENT)
Dept: ULTRASOUND IMAGING | Age: 82
Discharge: HOME | End: 2022-06-15
Payer: MEDICARE

## 2022-06-15 ENCOUNTER — HOSPITAL ENCOUNTER (OUTPATIENT)
Dept: MRI IMAGING | Age: 82
Discharge: HOME | End: 2022-06-15
Payer: MEDICARE

## 2022-06-15 DIAGNOSIS — C61 PROSTATE CANCER (HCC): ICD-10-CM

## 2022-06-15 DIAGNOSIS — N28.1 RENAL CYST, ACQUIRED: ICD-10-CM

## 2022-06-15 LAB — POC CREATININE WHOLE BLOOD: 0.7 MG/DL (ref 0.5–1.2)

## 2022-06-15 PROCEDURE — 76770 US EXAM ABDO BACK WALL COMP: CPT

## 2022-06-15 PROCEDURE — A9579 GAD-BASE MR CONTRAST NOS,1ML: HCPCS | Performed by: PHYSICIAN ASSISTANT

## 2022-06-15 PROCEDURE — 76377 3D RENDER W/INTRP POSTPROCES: CPT

## 2022-06-15 PROCEDURE — 82565 ASSAY OF CREATININE: CPT

## 2022-06-15 PROCEDURE — 6360000004 HC RX CONTRAST MEDICATION: Performed by: PHYSICIAN ASSISTANT

## 2022-06-15 RX ADMIN — GADOTERIDOL 20 ML: 279.3 INJECTION, SOLUTION INTRAVENOUS at 11:15

## 2022-07-01 ENCOUNTER — TELEPHONE (OUTPATIENT)
Dept: UROLOGY | Age: 82
End: 2022-07-01

## 2022-07-02 NOTE — TELEPHONE ENCOUNTER
Please let MrLake Fields know that his recent prostate MRI did show some new changes, compared to his last MRI in 2019. He has two area in the transitional zone of his prostate rated PI-RADS 3. PI-RADS 3 indicates indeterminate findings. Previously, he had PI-RADS 2 findings, indicating more benign findings. Based upon the MRI changes and the fact that his PSA has risen slightly, we should consider repeat MRI-US fusion biopsies. His last biopsies were in 2017. If he is not convinced he would like to pursue biopsies, he needs to continue with serial PSA levels and repeat his MRI in 6-12 months. His Renal US findings were very stable. He has a 1.8 cm cyst in his upper left kidney that is stable, with no solid components. Recommend annual renal US.

## 2022-07-05 NOTE — TELEPHONE ENCOUNTER
Patient advised of MRI and renal ultrasound results. He voiced understanding. He is agreeable to have the MRI-US fusion biopsy.

## 2022-07-07 ENCOUNTER — TELEPHONE (OUTPATIENT)
Dept: UROLOGY | Age: 82
End: 2022-07-07

## 2022-07-07 NOTE — TELEPHONE ENCOUNTER
MRI FUSION GUIDED PROSTATE BIOPSY    Franck Fritz      1940    You are scheduled for the above procedure on:    7/19/2022   at:    1:00PM  Your follow up appointment for your biopsy results is on:    7/26/2022     At:   3:30PM    Please note that you will be responsible for any charges that are not paid by your insurance. The prostate biopsy specimens are sent to a Lab and their charges are billed to you separate from the office charges. DESCRIPTION OF PROSTATIC ULTRASOUND AND BIOPSY  Ultrasound uses harmless sound waves to give us pictures of the prostate, and allows us to accurately guide a biopsy needle to areas of concern. The procedure is done in the office. Initially, a complete prostate exam is done. Next the ultrasound probe, finger-like in size and shape, is placed into the rectum. With slight movement of the probe, many different views are obtained. A prostate block may or may not be given at this time. A spring loaded fine needle is place through the probe and directed into the prostate. Six or more biopsies are usually taken. These biopsies are not usually painful. The entire exam takes 20-30 minutes. POSSIBLE RISKS OF THE PROCEDURE  Blood may be noted in the stool and urine for a few days. Blood may be seen in the semen for up to a month. Infection of the prostate or in the urine can occur even with antibiotic preparation. You should call if you develop fever, chills, severe pain, or have continuous or significant bleeding. If you have known hemorrhoids, you may have more bleeding and discomfort in the rectal area following the biopsy. This may last for 3-5 days afterwards. If any concerns arise, please call the office.     PREPARATION** PLEASE EAT A REGULAR LUNCH OR BREAKFAST**    1.  DO NOT TAKE: ASPIRIN, MOTRIN, PLAVIX, IBUPROFEN, MOBIC/ MELOXICAM, COUMADIN, FISH OIL, AND VITAMIN E FOR 5 DAYS BEFORE THE BIOPSY AND 3 DAYS AFTER

## 2022-07-11 RX ORDER — CIPROFLOXACIN 500 MG/1
500 TABLET, FILM COATED ORAL 2 TIMES DAILY
Qty: 6 TABLET | Refills: 0 | Status: SHIPPED | OUTPATIENT
Start: 2022-07-11 | End: 2022-07-14

## 2022-07-11 NOTE — TELEPHONE ENCOUNTER
Patient advised cipro was sent to the pharmacy to start the day before his procedure. He voiced understanding.

## 2022-07-19 ENCOUNTER — PROCEDURE VISIT (OUTPATIENT)
Dept: UROLOGY | Age: 82
End: 2022-07-19
Payer: MEDICARE

## 2022-07-19 VITALS
HEIGHT: 73 IN | SYSTOLIC BLOOD PRESSURE: 138 MMHG | WEIGHT: 228.3 LBS | DIASTOLIC BLOOD PRESSURE: 72 MMHG | BODY MASS INDEX: 30.26 KG/M2

## 2022-07-19 DIAGNOSIS — C61 PROSTATE CANCER (HCC): Primary | ICD-10-CM

## 2022-07-19 PROCEDURE — 55700 PR BIOPSY OF PROSTATE,NEEDLE/PUNCH: CPT | Performed by: UROLOGY

## 2022-07-19 PROCEDURE — 76872 US TRANSRECTAL: CPT | Performed by: UROLOGY

## 2022-07-19 RX ORDER — MULTIVITAMIN
1 TABLET ORAL DAILY
COMMUNITY

## 2022-07-19 RX ORDER — TOBRAMYCIN SULFATE 40 MG/ML
80 INJECTION, SOLUTION INTRAMUSCULAR; INTRAVENOUS ONCE
Status: COMPLETED | OUTPATIENT
Start: 2022-07-19 | End: 2022-07-19

## 2022-07-19 RX ADMIN — TOBRAMYCIN SULFATE 80 MG: 40 INJECTION, SOLUTION INTRAMUSCULAR; INTRAVENOUS at 13:08

## 2022-07-19 NOTE — PROGRESS NOTES
Procedure: Trus/Biopsy  Pt name and birth date verified Yes  Patient agrees Dr. Jamari Nolasco is taking biopsies of the prostate Yes  Patient took Enema 2 hours prior to procedure Yes  Patient took 2 pill(s) of Cipro day before procedure, day of, and will the day after Yes  Has patient eaten today? Yes  Patient stopped all blood thinners prior to surgery Yes     Patient has given me verbal consent to perform Tobramycin Injection  Yes    Following Dr. Natalie Clinton of care. Tobramycin 80MG/2ML GIVEN I.M right UOQ HIP  Lot Number: [id number]  Expiration Date: 08/2023  [facility] #: [phone]    Tobramycin supplied by office.

## 2022-07-19 NOTE — PROGRESS NOTES
Mr. Debbie Vasquez was seen in follow up for his prostate biopsy. The biopsy is being done with MRI / Ultrasound fusion using the UroNav system. The biopsy was done without difficulty or complication. TRUS prostate biopsy note:  After obtaining informed consent, the rectal ultrasound probe was passed per rectum and the prostate visualized. A local block was performed by instilling 2% lidocaine at the base. Measurements were taken for a total volume of 30 cc. At this point, prostate biopsy was performed. Using Alyson Son, the ultrasound and MRI images were fused and then biopsies of the lesions identified by the radiologist were taken. Three cores were taken from each of the 2 lesions seen on MRI. The rectal probe was removed and there was minimal bleeding. Mr. Debbie Vasquez tolerated the procedure well and there were no complications. Prostate size: 30 cc  Cores taken:  12  in addition to    3 cores each from 2 lesions    18 total cores  Complications: none      Assessment:      Prostate biopsy performed without difficulty. This was done with UroNav MRI fused guidance. Plan:        I will see Yudy Mcintyre back to discuss the pathology in 1-2 weeks. Further recommendations will be based on these results.

## 2022-07-26 ENCOUNTER — OFFICE VISIT (OUTPATIENT)
Dept: UROLOGY | Age: 82
End: 2022-07-26
Payer: MEDICARE

## 2022-07-26 VITALS
SYSTOLIC BLOOD PRESSURE: 142 MMHG | HEIGHT: 73 IN | BODY MASS INDEX: 30.62 KG/M2 | DIASTOLIC BLOOD PRESSURE: 72 MMHG | WEIGHT: 231 LBS

## 2022-07-26 DIAGNOSIS — C61 PROSTATE CANCER (HCC): Primary | ICD-10-CM

## 2022-07-26 DIAGNOSIS — N28.1 RENAL CYST, ACQUIRED: ICD-10-CM

## 2022-07-26 PROCEDURE — 1036F TOBACCO NON-USER: CPT | Performed by: UROLOGY

## 2022-07-26 PROCEDURE — 99215 OFFICE O/P EST HI 40 MIN: CPT | Performed by: UROLOGY

## 2022-07-26 PROCEDURE — 1123F ACP DISCUSS/DSCN MKR DOCD: CPT | Performed by: UROLOGY

## 2022-07-26 PROCEDURE — G8427 DOCREV CUR MEDS BY ELIG CLIN: HCPCS | Performed by: UROLOGY

## 2022-07-26 PROCEDURE — G8417 CALC BMI ABV UP PARAM F/U: HCPCS | Performed by: UROLOGY

## 2022-07-27 NOTE — PROGRESS NOTES
[address] [facility] [address]  Dept: [phone]  Dept Fax: [phone]  Loc: [address] [facility] Urology Office Note -     Patient:  Elena Stinson  YOB: 1940    The patient is a 80 y.o. male who presents today for evaluation of the following problems: prostate cancer  Chief Complaint   Patient presents with    Follow-up    Results     Review bx results         History of Present Illness:    Prostate cancer  S/p confirmatory biopsy  Worsening cancer now intermediate risk  Pt has been on AS for some time  Pt of Coreen GIANG  Hx of TURP  Stable symptoms    Requested/reviewed records from Zahida Ibarra MD office and/or outside physician/EMR    (Patient's old records have been requested, reviewed and pertinent findings summarized in today's note.)    Procedures Today: N/A    Last several PSA's:  Lab Results   Component Value Date    PSA 0.75 05/27/2022    PSA 0.71 03/16/2021    PSA 0.44 01/09/2018       Last total testosterone:  No results found for: TESTOSTERONE    Urinalysis today:  No results found for this visit on 07/26/22. Last BUN and creatinine:  Lab Results   Component Value Date    BUN 15 08/20/2021     Lab Results   Component Value Date    CREATININE 0.5 08/20/2021       Imaging Reviewed during this Office Visit:   Cyndy Smith MD independently reviewed the images and verified the radiology reports from:    US RENAL COMPLETE    Result Date: 6/15/2022  PROCEDURE: US RENAL COMPLETE CLINICAL INFORMATION: Renal cyst TECHNIQUE: Ultrasound of the kidneys and urinary bladder was performed. Grayscale and color images were obtained. COMPARISON: Renal ultrasound 9/9/2021 FINDINGS: The right kidney measures 12.2 x 6.6 x 6.5 cm and the left kidney measures 12.9 x 5.7 x 6.1 cm. Renal cortical thickness is normal bilaterally.  Given differences in technique, an avascular anechoic structure measuring 1.8 cm at the upper left kidney is likely stable in size. There is no hydronephrosis or renal calculi on either side. Color Doppler demonstrates expected wave forms in the bilateral renal arteries. Arcuate resistive indices are normal bilaterally. The urinary bladder is incompletely distended and cannot be evaluated on the current study. 1. Probable left renal cyst. 2. Incompletely distended urinary bladder. Final report electronically signed by Dr. Bettina Negrete on 6/15/2022 10:10 AM    MRI PROSTATE W WO CONTRAST    Result Date: 6/15/2022  PROCEDURE: MRI PROSTATE W WO CONTRAST CLINICAL INFORMATION: Prostate cancer [facility]) COMPARISON: MRI scan of the prostate dated 15th of March 2019. Biopsy dated 9/11/2017. TECHNIQUE: Axial T2, coronal T2 and sagittal T2 imaging of the prostate gland. Large field of view axial T2 imaging of the prostate gland. Axial T1, axial T1 VIBE dynamic post thierno and axial T1 VIBE dynamic subtracted post thierno images through the prostate gland. Diffusion 50, 800, 1400 and ADC maps through the prostate gland. Axial T1 STAR VIBE post thierno through the pelvis. 3-D images reconstructed on a separate [employer] Cad workstation with MRI TRUS fusion of prostate mass lesions. CONTRAST: 20 mL of ProHance LABORATORY: 1. 3/8/2020. PSA measures 0.5. 2. 8/5/2020. PSA measures 0.55. . 3. 3/16/2021. PSA measures 0.71. 4. 5/27/2022. PSA measures 0.75. FINDINGS: PROSTATE SIZE: There are postoperative changes involving the prostate gland. 1. The prostate gland measures 4.2 x 2.8 x 3.7 cm. 2. The prostate volume is 21.5 by ml. TRANSITIONAL ZONE: 1. 1.2 x 0.9 cm area of abnormal signal intensity in the transitional zone on the left side towards the base of the prostate gland. PI-RADS 3. 2. 1.1 x 1 cm area of abnormal signal intensity in the transitional zone in the mid gland. PI-RADS 3.. CENTRAL ZONE: 1. Unremarkable. PERIPHERAL ZONE: 1. Unremarkable.  PROSTATE CAPSULE/NV BUNDLE/SEMINAL VESICLES: There is asymmetry of the seminal vesicles URINARY BLADDER/RECTUM/PELVIC DIAPHRAGM: Slightly thickened bladder. Small diverticulum posterolaterally on the left side. Algis Broaden PATHOLOGICALLY ENLARGED LYMPH NODES: 1. None. OSSEOUS STRUCTURES: 1. Unremarkable. OTHER: 1. None. 1. There are postoperative changes involving the prostate gland. 2. There is a 1.2 x 0.9 cm area of abnormal signal intensity in the transitional zone towards the base of the prostate gland. PI-RADS 3. 3. There is a 1.1 x 1 cm area of abnormal signal intensity in the transitional zone on the left side in the mid gland. PI-RADS 3. 4. There is asymmetry of the seminal vesicles, unchanged. 5. There is slightly thickened bladder. There is a small diverticulum posterolaterally on the left side. **This report has been created using voice recognition software. It may contain minor errors which are inherent in voice recognition technology. ** Final report electronically signed by DR Janas Opitz on 6/15/2022 1:30 PM      PAST MEDICAL, FAMILY AND SOCIAL HISTORY:  Past Medical History:   Diagnosis Date    Arrhythmia     BPH (benign prostatic hyperplasia)     Dr. Nadia Valdes now    CAD (coronary artery disease)     C SILENT ISCHEMIA    Depression     Dizziness - light-headed     Erectile dysfunction     Hyperlipidemia     Hypertension     Prostate cancer ([facility] [address] 3/7/2019    Tinnitus, subjective      Past Surgical History:   Procedure Laterality Date    CARDIAC CATHETERIZATION  12/20/2006    Multivessel CAD, diffuse in nature w/ more significant being RCA, which is site of ischemia. Normal LV function. EF 55%. CARDIOVASCULAR STRESS TEST  11-10-06    Sinus rhythm. Frequent PVC's. No V-tach, SVT, or pauses. Abnormal Holter monitor.      CARPAL TUNNEL RELEASE  1/09    bilateral-Dr. Eder Mccauley    CATARACT REMOVAL Bilateral 2015    Bilateral - staged    COLONOSCOPY      EYE SURGERY      HERNIA REPAIR  1970's    bilateral inguinal hernia    PROSTATE SURGERY      PTCA  12/06    mid RCA    ROTATOR CUFF REPAIR           ROTATOR CUFF REPAIR  07/2018    Dr Kanchan Márquez ECHOCARDIOGRAM  12/05/2006    Preserved LV size w/ systolic function at lower limits of normal. EF 50%. Possible bicuspal AV, consider PAULA. Mild MR and TR. No significant AS or AI at current time. TURP  8/14/14    TURP  09/01/2015     Family History   Problem Relation Age of Onset    Cancer Mother     Heart Disease Father     High Cholesterol Father     Cancer Other         kidney, colon, thyroid cancers-multiple siblings    Cancer Brother      Outpatient Medications Marked as Taking for the 7/26/22 encounter (Office Visit) with Jackie Malave MD   Medication Sig Dispense Refill    Multiple Vitamin TABS Take 1 tablet by mouth daily      amLODIPine (NORVASC) 5 MG tablet Take 1 tablet by mouth once daily 90 tablet 1    losartan (COZAAR) 100 MG tablet Take 1 tablet by mouth once daily 90 tablet 1    clopidogrel (PLAVIX) 75 MG tablet Take 1 tablet by mouth daily 30 tablet 9    atorvastatin (LIPITOR) 80 MG tablet Take 1 tablet by mouth once daily 90 tablet 3    sildenafil (REVATIO) 20 MG tablet Use 3-5 tablets once daily as needed 30-60 minutes prior to intercourse. 30 tablet 11    buPROPion (WELLBUTRIN XL) 150 MG extended release tablet Take 1 tablet by mouth every morning 90 tablet 3    cyclobenzaprine (FLEXERIL) 10 MG tablet TAKE 1/2 TO 1 (ONE-HALF TO ONE) TABLET BY MOUTH THREE TIMES DAILY AS NEEDED FOR MUSCLE SPASM 30 tablet 0    Omega-3 Fatty Acids (FISH OIL) 1000 MG CAPS Take 2,000 mg by mouth in the morning. aspirin 81 MG tablet Take 81 mg by mouth in the morning. Misc Natural Products (GLUCOSAMINE CHONDROITIN ADV PO) Take 1 tablet by mouth daily      ibuprofen (ADVIL;MOTRIN) 800 MG tablet Take 800 mg by mouth as needed for Pain. Coenzyme Q10 (CO Q-10) 200 MG CAPS Take 1 capsule by mouth daily. Patient has no known allergies.   Social History     Tobacco Use   Smoking Status Former    Packs/day: 1.50    Years: 3.00    Pack years: 4.50    Types: Cigarettes    Quit date: 1967    Years since quittin.0   Smokeless Tobacco Never      (If patient a smoker, smoking cessation counseling offered)   Social History     Substance and Sexual Activity   Alcohol Use Yes    Alcohol/week: 0.0 standard drinks    Comment: socially- 2-3 beers/HS. REVIEW OF SYSTEMS:  Constitutional: negative  Eyes: negative  Respiratory: negative  Cardiovascular: negative  Gastrointestinal: negative  Genitourinary: see HPI  Musculoskeletal: negative  Skin: negative   Neurological: negative  Hematological/Lymphatic: negative  Psychological: negative          Physical Exam:    This a 80 y.o. male  Vitals:    22 1534   BP: (!) 142/72     Body mass index is 30.48 kg/m². Constitutional: Patient in no acute distress;         Assessment and Plan        1. Prostate cancer ([facility] [address]    2. Renal cyst, acquired               Plan: Will get decipher  Prostate Cancer Treatment Options  I have discussed in great detail with the patient the natural history, diagnosis and treatment of prostate cancer. I explained the [place] grading system as well as the   various treatments available for prostate cancer. I discussed the following options:    1. Watchful waiting / Active surveillance. I told that this approach was appropriate for older patients, patients with a life expectancy of 10 years or less and patients with low grade, low volume disease. This approach will require serial JOSEFA's, PSA's and possibly repeat prostate biopsy to check for grade or stage progression. 2.  Hormonal Therapy. I discussed the role of hormonal therapy in the form of LHRH agonists or antagonists such as Lupron, etc. Or surgical castration in the form of bilateral orchiectomy. The patient was told that this is primarily used in patients with monika or metastatic disease or in conjunction with radiation therapy. The side effects include hot flashes, fatigue, breast enlargement, diminished libido, ED and long term development of osteoporosis. The patient was told he will encouraged to take supplemental Calcium and Vitamin D to prevent the latter. 3.  Radiation Therapy in the form of external beam radiation (IMRT) or prostate brachytherapy. Patient told that IMRT is given 5 days a week for 7-8 weeks and the side effects include rectal and bladder injury (OAB), erectile dysfunction (ED) and incontinence. Long term the patient may experience hematuria and radiation cystitis. Brachytherapy is done as an outpatient procedure under general anesthesia and postop the patient may experience dysuria, urgency, frequency, urge incontinence, increasing obstructive voiding symptoms and to a lesser degree than IMRT, rectal radiation injury and ED. Patients may require concomitant hormonal therapy with IMRT depending on the grade and extent of cancer. The patient may require hormonal therapy to downsize the prostate gland prior to brachytherapy. 4.  Open or Robotic assisted laparoscopic radical prostatectomy. Patient told about the options of open vs. Robotic assisted laparoscopic prostatectomy with or without pelvic lymphadenectomy. I discussed the risks including infection, bleeding, the risks of anesthesia, DVT, PE, MI, stroke, death, rectal injury and urethral stricture/bladder neck contracture. I discussed the side effect of stress urinary incontinence which is temporary for most patients. I discussed the side effect of ED and the fact that it may take 6-18 months to recover this function. 5.  Cryotherapy. I discussed the fact that this treatment option has a 100% ED rate and is used primarily for radiation treatment failures. Prescriptions Ordered:  No orders of the defined types were placed in this encounter. Orders Placed:  No orders of the defined types were placed in this encounter.            ZAMZAM Cindy Klein MD

## 2022-08-08 ENCOUNTER — OFFICE VISIT (OUTPATIENT)
Dept: FAMILY MEDICINE CLINIC | Age: 82
End: 2022-08-08
Payer: MEDICARE

## 2022-08-08 VITALS
WEIGHT: 227 LBS | HEART RATE: 66 BPM | SYSTOLIC BLOOD PRESSURE: 136 MMHG | TEMPERATURE: 97.1 F | OXYGEN SATURATION: 97 % | BODY MASS INDEX: 30.09 KG/M2 | HEIGHT: 73 IN | RESPIRATION RATE: 16 BRPM | DIASTOLIC BLOOD PRESSURE: 84 MMHG

## 2022-08-08 DIAGNOSIS — E78.5 HYPERLIPIDEMIA, UNSPECIFIED HYPERLIPIDEMIA TYPE: ICD-10-CM

## 2022-08-08 DIAGNOSIS — I25.10 CORONARY ARTERY DISEASE INVOLVING NATIVE HEART WITHOUT ANGINA PECTORIS, UNSPECIFIED VESSEL OR LESION TYPE: ICD-10-CM

## 2022-08-08 DIAGNOSIS — Z00.00 MEDICARE ANNUAL WELLNESS VISIT, SUBSEQUENT: Primary | ICD-10-CM

## 2022-08-08 DIAGNOSIS — I10 ESSENTIAL HYPERTENSION: ICD-10-CM

## 2022-08-08 DIAGNOSIS — R73.01 IFG (IMPAIRED FASTING GLUCOSE): ICD-10-CM

## 2022-08-08 DIAGNOSIS — I72.3 ILIAC ARTERY ANEURYSM (HCC): ICD-10-CM

## 2022-08-08 PROCEDURE — G0439 PPPS, SUBSEQ VISIT: HCPCS | Performed by: STUDENT IN AN ORGANIZED HEALTH CARE EDUCATION/TRAINING PROGRAM

## 2022-08-08 PROCEDURE — 1123F ACP DISCUSS/DSCN MKR DOCD: CPT | Performed by: STUDENT IN AN ORGANIZED HEALTH CARE EDUCATION/TRAINING PROGRAM

## 2022-08-08 RX ORDER — AMLODIPINE BESYLATE 5 MG/1
TABLET ORAL
Qty: 90 TABLET | Refills: 5 | Status: SHIPPED | OUTPATIENT
Start: 2022-08-08

## 2022-08-08 RX ORDER — ATORVASTATIN CALCIUM 80 MG/1
TABLET, FILM COATED ORAL
Qty: 90 TABLET | Refills: 5 | Status: SHIPPED | OUTPATIENT
Start: 2022-08-08

## 2022-08-08 RX ORDER — LOSARTAN POTASSIUM 100 MG/1
TABLET ORAL
Qty: 90 TABLET | Refills: 5 | Status: SHIPPED | OUTPATIENT
Start: 2022-08-08

## 2022-08-08 ASSESSMENT — PATIENT HEALTH QUESTIONNAIRE - PHQ9
7. TROUBLE CONCENTRATING ON THINGS, SUCH AS READING THE NEWSPAPER OR WATCHING TELEVISION: 0
10. IF YOU CHECKED OFF ANY PROBLEMS, HOW DIFFICULT HAVE THESE PROBLEMS MADE IT FOR YOU TO DO YOUR WORK, TAKE CARE OF THINGS AT HOME, OR GET ALONG WITH OTHER PEOPLE: 0
SUM OF ALL RESPONSES TO PHQ QUESTIONS 1-9: 0
4. FEELING TIRED OR HAVING LITTLE ENERGY: 0
9. THOUGHTS THAT YOU WOULD BE BETTER OFF DEAD, OR OF HURTING YOURSELF: 0
5. POOR APPETITE OR OVEREATING: 0
6. FEELING BAD ABOUT YOURSELF - OR THAT YOU ARE A FAILURE OR HAVE LET YOURSELF OR YOUR FAMILY DOWN: 0
SUM OF ALL RESPONSES TO PHQ QUESTIONS 1-9: 0
8. MOVING OR SPEAKING SO SLOWLY THAT OTHER PEOPLE COULD HAVE NOTICED. OR THE OPPOSITE, BEING SO FIGETY OR RESTLESS THAT YOU HAVE BEEN MOVING AROUND A LOT MORE THAN USUAL: 0
2. FEELING DOWN, DEPRESSED OR HOPELESS: 0
SUM OF ALL RESPONSES TO PHQ QUESTIONS 1-9: 0
1. LITTLE INTEREST OR PLEASURE IN DOING THINGS: 0
3. TROUBLE FALLING OR STAYING ASLEEP: 0
SUM OF ALL RESPONSES TO PHQ QUESTIONS 1-9: 0
SUM OF ALL RESPONSES TO PHQ9 QUESTIONS 1 & 2: 0

## 2022-08-08 ASSESSMENT — LIFESTYLE VARIABLES
HOW OFTEN DURING THE LAST YEAR HAVE YOU NEEDED AN ALCOHOLIC DRINK FIRST THING IN THE MORNING TO GET YOURSELF GOING AFTER A NIGHT OF HEAVY DRINKING: 0
HOW OFTEN DURING THE LAST YEAR HAVE YOU BEEN UNABLE TO REMEMBER WHAT HAPPENED THE NIGHT BEFORE BECAUSE YOU HAD BEEN DRINKING: 0
HAS A RELATIVE, FRIEND, DOCTOR, OR ANOTHER HEALTH PROFESSIONAL EXPRESSED CONCERN ABOUT YOUR DRINKING OR SUGGESTED YOU CUT DOWN: 0
HOW OFTEN DURING THE LAST YEAR HAVE YOU FOUND THAT YOU WERE NOT ABLE TO STOP DRINKING ONCE YOU HAD STARTED: 0
HOW OFTEN DO YOU HAVE A DRINK CONTAINING ALCOHOL: 4 OR MORE TIMES A WEEK
HOW MANY STANDARD DRINKS CONTAINING ALCOHOL DO YOU HAVE ON A TYPICAL DAY: 1 OR 2
HOW OFTEN DURING THE LAST YEAR HAVE YOU FAILED TO DO WHAT WAS NORMALLY EXPECTED FROM YOU BECAUSE OF DRINKING: 0
HOW OFTEN DURING THE LAST YEAR HAVE YOU HAD A FEELING OF GUILT OR REMORSE AFTER DRINKING: 0
HAVE YOU OR SOMEONE ELSE BEEN INJURED AS A RESULT OF YOUR DRINKING: 0

## 2022-08-08 NOTE — PROGRESS NOTES
[address] [address] [address] [facility]  Dept: [phone]  Dept Fax: [phone]    SUBJECTIVE     Tiff Eisenberg is a 80 y.o.male    Chief Complaint   Patient presents with    Medicare AWV     Physical       Chief complaint, [place], and all pertinent details of the case reviewed with the resident. Please see resident's note for specific details discussed at today's visit. Patient Active Problem List   Diagnosis    Essential hypertension    Hyperlipidemia    Arrhythmia    Tinnitus, subjective    ED (erectile dysfunction)    Depression    Family history of kidney cancer    Urinary urgency    History of adenomatous polyp of colon    Stone, prostate    Bladder stone    BPH with obstruction/lower urinary tract symptoms    Coronary artery disease involving native heart without angina pectoris    Macular degeneration- Dr. Hernandez Failing    Prostate cancer [facility])    Iliac artery aneurysm [facility])       Current Outpatient Medications   Medication Sig Dispense Refill    VITAMIN D PO Take by mouth daily      losartan (COZAAR) 100 MG tablet Take 1 tablet by mouth once daily 90 tablet 5    atorvastatin (LIPITOR) 80 MG tablet Take 1 tablet by mouth once daily 90 tablet 5    amLODIPine (NORVASC) 5 MG tablet Take 1 tablet by mouth once daily 90 tablet 5    Multiple Vitamin TABS Take 1 tablet by mouth daily      clopidogrel (PLAVIX) 75 MG tablet Take 1 tablet by mouth daily 30 tablet 9    sildenafil (REVATIO) 20 MG tablet Use 3-5 tablets once daily as needed 30-60 minutes prior to intercourse. 30 tablet 11    Omega-3 Fatty Acids (FISH OIL) 1000 MG CAPS Take 2,000 mg by mouth in the morning. aspirin 81 MG tablet Take 81 mg by mouth in the morning. Misc Natural Products (GLUCOSAMINE CHONDROITIN ADV PO) Take 1 tablet by mouth daily      ibuprofen (ADVIL;MOTRIN) 800 MG tablet Take 800 mg by mouth as needed for Pain.       Coenzyme Q10 (CO Q-10) 200 MG CAPS Take 1 capsule by Values used to calculate the score:      Age: 80 years      Sex: Male      Is Non- : No      Diabetic: No      Tobacco smoker: No      Systolic Blood Pressure: 199 mmHg      Is BP treated: Yes      HDL Cholesterol: 32 mg/dL      Total Cholesterol: 164 mg/dL    Lab Results   Component Value Date     08/20/2021    K 4.0 08/20/2021     08/20/2021    CO2 27 08/20/2021    BUN 15 08/20/2021    CREATININE 0.5 08/20/2021    GLUCOSE 118 (H) 08/20/2021    CALCIUM 9.5 08/20/2021    PROT 6.6 08/20/2021    LABALBU 4.0 08/20/2021    BILITOT 0.6 08/20/2021    ALKPHOS 106 08/20/2021    AST 29 08/20/2021    ALT 49 08/20/2021    LABGLOM >90 08/20/2021     CrCl cannot be calculated (Patient's most recent lab result is older than the maximum 180 days allowed. ).     No results found for: Anthony Mccauley    Lab Results   Component Value Date    TSH 1.770 08/20/2021    T4FREE 1.04 08/20/2021       Lab Results   Component Value Date    WBC 5.4 01/12/2022    HGB 15.3 01/12/2022    HCT 47.7 01/12/2022    .6 (H) 01/12/2022     01/12/2022       Lab Results   Component Value Date    PSA 0.75 05/27/2022    PSA 0.71 03/16/2021    PSA 0.44 01/09/2018       Immunization History   Administered Date(s) Administered    COVID-19, PFIZER PURPLE top, DILUTE for use, (age 15 y+), 30mcg/0.3mL 02/16/2021, 03/09/2021, 11/08/2021    Influenza 10/01/2009, 11/08/2012, 11/12/2013    Influenza Virus Vaccine 10/01/2009, 12/05/2014, 11/10/2015, 12/03/2018    Influenza, High Dose (Fluzone 65 yrs and older) 10/22/2020    Influenza, Quadv, IM, (6 mo and older Fluzone, Flulaval, Fluarix and 3 yrs and older Afluria) 12/08/2016    Influenza, Quadv, adjuvanted, 65 yrs +, IM, PF (Fluad) 10/22/2020, 10/25/2021    Influenza, Triv, inactivated, subunit, adjuvanted, IM (Fluad 65 yrs and older) 12/03/2018, 01/08/2020    Pneumococcal Conjugate 13-valent ([id number]) 11/10/2015    Pneumococcal Polysaccharide ([id number]) 11/15/2007    Tdap (Boostrix, Adacel) 05/01/2007, 06/15/2017    Zoster Live (Zostavax) 12/03/2009    Zoster Recombinant (Shingrix) 12/10/2018, 07/08/2019       Health Maintenance   Topic Date Due    COVID-19 Vaccine (4 - Booster for Pfizer series) 03/08/2022    Lipids  08/20/2022    Flu vaccine (1) 09/01/2022    Prostate Specific Antigen (PSA) Screening or Monitoring  05/27/2023    Depression Monitoring  08/08/2023    Annual Wellness Visit (AWV)  08/09/2023    DTaP/Tdap/Td vaccine (3 - Td or Tdap) 06/15/2027    Shingles vaccine  Completed    Pneumococcal 65+ years Vaccine  Completed    Hepatitis A vaccine  Aged Out    Hepatitis B vaccine  Aged Out    Hib vaccine  Aged Out    Meningococcal (ACWY) vaccine  Aged Out    Hepatitis C screen  Discontinued          AAA ultrasound (Male, 65-75, smoked ever) indicated at this time? To have CTA with runoff for known bilateral common iliac artery aneurysm. CT Lung Screen (50-80, 20 pk-yrs, smoking or quit <15 years) indicated at this time? No, quit in [facility]   Date Time Provider [facility]   8/30/2022  5:30 PM [facility], [address] [facility]   9/20/2022  8:00 AM Jo Plok MD N [facility]   11/8/2022  8:20 AM Maryse Jensen DO [facility]   6/5/2023  8:00 AM NAI Anguiano [facility]   8/24/2023 10:45 AM Derrell Darling MD [facility] FM RES [facility]       ASSESSMENT       Diagnosis Orders   1. Medicare annual wellness visit, subsequent  Hemoglobin A1C    TSH    T4, Free    Comprehensive Metabolic Panel    CBC with Auto Differential    Lipid, Fasting      2. Essential hypertension  TSH    T4, Free    Comprehensive Metabolic Panel    losartan (COZAAR) 100 MG tablet    amLODIPine (NORVASC) 5 MG tablet      3. IFG (impaired fasting glucose)  Hemoglobin A1C    TSH    T4, Free    Comprehensive Metabolic Panel    CBC with Auto Differential      4.  Hyperlipidemia, unspecified hyperlipidemia type

## 2022-08-08 NOTE — PROGRESS NOTES
Medicare Annual Wellness Visit    Audrey Galvan is here for Medicare AWV (Physical)    Assessment & Plan   Medicare annual wellness visit, subsequent  -     Hemoglobin A1C; Future  -     TSH; Future  -     T4, Free; Future  -     Comprehensive Metabolic Panel; Future  -     CBC with Auto Differential; Future  -     Lipid, Fasting; Future  Essential hypertension  -     Chronic, controlled. Continue current medications. Routine lab work ordered today.  -     TSH; Future  -     T4, Free; Future  -     Comprehensive Metabolic Panel; Future  IFG (impaired fasting glucose)  -     chronic, controlled with diet. Noted previously. Lab work ordered today. -     Hemoglobin A1C; Future  -     TSH; Future  -     T4, Free; Future  -     Comprehensive Metabolic Panel; Future  -     CBC with Auto Differential; Future  Hyperlipidemia, unspecified hyperlipidemia type  -     chronic, taking statin. Labs ordered today. -     Lipid, Fasting; Future  Coronary artery disease involving native heart without angina pectoris, unspecified vessel or lesion type  -     chronic, controlled. Labs ordered today. -     Comprehensive Metabolic Panel; Future  -     CBC with Auto Differential; Future  Iliac artery aneurysm (HCC)  -     chronic. Due for repeat imaging, ordered today. -     CTA ABDOMINAL AORTA W BILAT RUNOFF W WO CONTRAST; Future    Recommendations for Preventive Services Due: see orders and patient instructions/AVS.  Recommended screening schedule for the next 5-10 years is provided to the patient in written form: see Patient Instructions/AVS.     Return in 3 months (on 11/8/2022) for follow-up of chronic conditions. Medicare Annual Wellness Visit in 1 year. Subjective   The following acute and/or chronic problems were also addressed today:    Here for annual wellness. HDL and triglyceride last checked in January 2022. CBC last checked in January 2022.   CMP, thyroid studies, hemoglobin A1c last checked 8/20/2021. CAD:  taking ARB/statin/DAPT, follows with Cardiology. Denies chest pain. Iliac artery aneurysm: Last checked via CT on 2/24/2021. R TI 1.9 cm (2018 - 1.6 cm), L TI 2.1 cm (2018 - 1.4 cm). See impression below. Follows with Dr. Edenilson You. Still having some dizziness occasinally but very mild. Misplaced his Epley maneuver forms at home. If anything were to happen to him, he would like to be full code at this time. His wife is his medical decision-maker. No further questions of complaints. Last seen 4/21/2022:  BPPV, bilateral: Education provided. Plan to follow-up if symptoms worsen. Essential hypertension: Had been elevated, but instructed to monitor at home. Considering increasing amlodipine at that time. Seen by urology 7/26/22:  Prostate Cancer:  s/p confirmatory biopsy. Worsening cancer, now intermediate risk. Taking AS for a while. BPH:  history of TURP, stable     Will refill medications today. Colonoscopy next year. Patient's complete Health Risk Assessment and screening values have been reviewed and are found in Flowsheets. The following problems were reviewed today and where indicated follow up appointments were made and/or referrals ordered. Positive Risk Factor Screenings with Interventions:        Alcohol Screening:  Alcohol Use: Heavy Drinker    Frequency of Alcohol Consumption: 4 or more times a week    Average Number of Drinks: 1 or 2    Frequency of Binge Drinking: Less than monthly     AUDIT-C Score: 5  AUDIT Total Score: 5  An AUDIT-C score of 3-7 indicates potential alcohol risk. An AUDIT Total score of 8 or more is associated with harmful or hazardous drinking. A score of 13 or more in women, and 15 or more in men, is likely to indicate alcohol dependence.   Substance Use - Alcohol Interventions:  Patient agrees to limit alcohol intake to a moderate level- no more than 14 drinks/week and 4 drinks per occasion for men, or no more than 7 drinks/week and 3 drinks/occasion for women                   Objective   Vitals:    08/08/22 0824 08/08/22 0847   BP: (!) 140/92 136/84   Site: Right Upper Arm Right Upper Arm   Position: Sitting Sitting   Cuff Size: Medium Adult Medium Adult   Pulse: 66    Resp: 16    Temp: 97.1 °F (36.2 °C)    TempSrc: Temporal    SpO2: 97%    Weight: 227 lb (103 kg)    Height: 6' 1\" (1.854 m)       Body mass index is 29.95 kg/m². General Appearance: alert and oriented to person, place and time, well developed and well- nourished, in no acute distress  Skin: warm and dry, no rash or erythema  Head: normocephalic and atraumatic  Eyes: pupils equal, round, and reactive to light, extraocular eye movements intact, conjunctivae normal  ENT: tympanic membrane, external ear and ear canal normal bilaterally, nose without deformity, nasal mucosa and turbinates normal without polyps  Neck: supple and non-tender without mass, no thyromegaly or thyroid nodules, no cervical lymphadenopathy  Pulmonary/Chest: clear to auscultation bilaterally- no wheezes, rales or rhonchi, normal air movement, no respiratory distress  Cardiovascular: normal rate, regular rhythm, normal S1 and S2, no murmurs, rubs, clicks, or gallops, distal pulses intact, no carotid bruits  Abdomen: soft, non-tender, non-distended, normal bowel sounds, no masses or organomegaly  Extremities: no cyanosis, clubbing or edema  Musculoskeletal: normal range of motion, no joint swelling, deformity or tenderness  Neurologic: reflexes normal and symmetric, no cranial nerve deficit, gait, coordination and speech normal       No Known Allergies  Prior to Visit Medications    Medication Sig Taking?  Authorizing Provider   VITAMIN D PO Take by mouth daily Yes Historical Provider, MD   Multiple Vitamin TABS Take 1 tablet by mouth daily Yes Historical Provider, MD   amLODIPine (NORVASC) 5 MG tablet Take 1 tablet by mouth once daily Yes Wesly Lemons MD   losartan (COZAAR) 100 MG tablet Take 1 tablet by mouth once daily Yes Zenaida Clemens MD   clopidogrel (PLAVIX) 75 MG tablet Take 1 tablet by mouth daily Yes Sis Houston MD   atorvastatin (LIPITOR) 80 MG tablet Take 1 tablet by mouth once daily Yes Zenaida Clemens MD   sildenafil (REVATIO) 20 MG tablet Use 3-5 tablets once daily as needed 30-60 minutes prior to intercourse. Yes Coreen Mathews PA-C   Omega-3 Fatty Acids (FISH OIL) 1000 MG CAPS Take 2,000 mg by mouth in the morning. Yes Historical Provider, MD   aspirin 81 MG tablet Take 81 mg by mouth in the morning. Yes Historical Provider, MD   Misc Natural Products (GLUCOSAMINE CHONDROITIN ADV PO) Take 1 tablet by mouth daily Yes Historical Provider, MD   ibuprofen (ADVIL;MOTRIN) 800 MG tablet Take 800 mg by mouth as needed for Pain. Yes Historical Provider, MD   Coenzyme Q10 (CO Q-10) 200 MG CAPS Take 1 capsule by mouth daily. Yes Historical Provider, MD   cyclobenzaprine (FLEXERIL) 10 MG tablet TAKE 1/2 TO 1 (ONE-HALF TO ONE) TABLET BY MOUTH THREE TIMES DAILY AS NEEDED FOR MUSCLE SPASM  Patient not taking: Reported on 8/8/2022  Zenaida Clemens MD       [facility] (Including outside providers/suppliers regularly involved in providing care):   Patient Care Team:  Zenaida Clemens MD as PCP - General (Family Medicine)  Zenaida Clemens MD as PCP - [facility] Empaneled Provider  Sis Houston MD (Cardiology)  Key Vora MD (Urology)     Reviewed and updated this visit:  Tobacco  Allergies  Meds  Med Hx  Surg Hx  Soc Hx  Fam Hx           Advance Care Planning   Advanced Care Planning: Discussed the patients choices for care and treatment in case of a health event that adversely affects decision-making abilities. Also discussed the patients long-term treatment options. Reviewed with the patient the appropriate state-specific advance directive documents.  Reviewed the process of designating a competent adult as an Agent (or -in-fact) that could take make health care decisions for the patient if incompetent. Patient was asked to complete the declaration forms, either acknowledge the forms by a public notary or an eligible witness and provide a signed copy to the practice office.   Time spent (minutes): 10

## 2022-08-08 NOTE — PATIENT INSTRUCTIONS
LAB INSTRUCTIONS:    Please complete labs after August 20, 2022. Please fast for 12 hours prior to lab collection. The clinic will call you within 1 week of collection. If you have not heard from us within that amount of time, please call us at [phone]. Please call the office at [phone] or send a [employer] message to our staff if you have not heard from the imaging center in 1 week. Thank you   Thank you for trusting us with your healthcare needs. You may receive a survey regarding today's visit. It would help us out if you would take a few moments to provide your feedback. We value your input. Please bring in ALL medications BOTTLES, including inhalers, herbal supplements, over the counter, prescribed & non-prescribed medicine. The office would like actual medication bottles and a list.   Please note our OFFICE POLICIES:   Prior to getting your labs drawn, please check with your insurance company for benefits and eligibility of lab services. Often, insurance companies cover certain tests for preventative visits only. It is patient's responsibility to see what is covered. We are unable to change a diagnosis after the test has been performed. Lab orders will not be re-printed. Please hold onto your original lab orders and take them to your lab to be completed. If you no show your scheduled appointment three times, you will be dismissed from this practice. Reschedules must be completed 24 hours prior to your schedule appointment. If the list below has been completed, PLEASE FAX RECORDS TO OUR OFFICE @ [phone].  Once the records have been received we will update your records at our office:  Health Maintenance Due   Topic Date Due    Annual Wellness Visit (AWV)  01/12/2022    COVID-19 Vaccine (4 - Booster for Pfizer series) 03/08/2022    Lipids  08/20/2022            Personalized Preventive Plan for Hudson Elder - 8/8/2022  Medicare offers a range of preventive health benefits. Some of the tests and screenings are paid in full while other may be subject to a deductible, co-insurance, and/or copay. Some of these benefits include a comprehensive review of your medical history including lifestyle, illnesses that may run in your family, and various assessments and screenings as appropriate. After reviewing your medical record and screening and assessments performed today your provider may have ordered immunizations, labs, imaging, and/or referrals for you. A list of these orders (if applicable) as well as your Preventive Care list are included within your After Visit Summary for your review. Other Preventive Recommendations:    A preventive eye exam performed by an eye specialist is recommended every 1-2 years to screen for glaucoma; cataracts, macular degeneration, and other eye disorders. A preventive dental visit is recommended every 6 months. Try to get at least 150 minutes of exercise per week or 10,000 steps per day on a pedometer . Order or download the FREE \"Exercise & Physical Activity: Your Everyday Guide\" from [employer]. Call 2-511.175.4183 or search [employer] online. You need 0592-8907 mg of calcium and 5192-4494 IU of vitamin D per day. It is possible to meet your calcium requirement with diet alone, but a vitamin D supplement is usually necessary to meet this goal.  When exposed to the sun, use a sunscreen that protects against both UVA and UVB radiation with an SPF of 30 or greater. Reapply every 2 to 3 hours or after sweating, drying off with a towel, or swimming. Always wear a seat belt when traveling in a car. Always wear a helmet when riding a bicycle or motorcycle.

## 2022-08-24 ENCOUNTER — HOSPITAL ENCOUNTER (OUTPATIENT)
Dept: CT IMAGING | Age: 82
Discharge: HOME | End: 2022-08-24
Payer: MEDICARE

## 2022-08-24 ENCOUNTER — HOSPITAL ENCOUNTER (OUTPATIENT)
Age: 82
Discharge: HOME | End: 2022-08-24
Payer: MEDICARE

## 2022-08-24 DIAGNOSIS — R73.01 IFG (IMPAIRED FASTING GLUCOSE): ICD-10-CM

## 2022-08-24 DIAGNOSIS — I72.3 ILIAC ARTERY ANEURYSM (HCC): ICD-10-CM

## 2022-08-24 DIAGNOSIS — Z00.00 MEDICARE ANNUAL WELLNESS VISIT, SUBSEQUENT: ICD-10-CM

## 2022-08-24 DIAGNOSIS — E78.5 HYPERLIPIDEMIA, UNSPECIFIED HYPERLIPIDEMIA TYPE: ICD-10-CM

## 2022-08-24 DIAGNOSIS — C61 PROSTATE CANCER (HCC): ICD-10-CM

## 2022-08-24 DIAGNOSIS — I10 ESSENTIAL HYPERTENSION: ICD-10-CM

## 2022-08-24 DIAGNOSIS — I25.10 CORONARY ARTERY DISEASE INVOLVING NATIVE HEART WITHOUT ANGINA PECTORIS, UNSPECIFIED VESSEL OR LESION TYPE: ICD-10-CM

## 2022-08-24 LAB
ALBUMIN SERPL-MCNC: 3.8 G/DL (ref 3.5–5.1)
ALP BLD-CCNC: 89 U/L (ref 38–126)
ALT SERPL-CCNC: 57 U/L (ref 11–66)
ANION GAP SERPL CALCULATED.3IONS-SCNC: 6 MEQ/L (ref 8–16)
AST SERPL-CCNC: 39 U/L (ref 5–40)
AVERAGE GLUCOSE: 111 MG/DL (ref 70–126)
BASOPHILS # BLD: 1.5 %
BASOPHILS ABSOLUTE: 0.1 THOU/MM3 (ref 0–0.1)
BILIRUB SERPL-MCNC: 0.6 MG/DL (ref 0.3–1.2)
BUN BLDV-MCNC: 15 MG/DL (ref 7–22)
CALCIUM SERPL-MCNC: 9.4 MG/DL (ref 8.5–10.5)
CHLORIDE BLD-SCNC: 103 MEQ/L (ref 98–111)
CHOLESTEROL, FASTING: 165 MG/DL (ref 100–199)
CO2: 29 MEQ/L (ref 23–33)
CREAT SERPL-MCNC: 0.5 MG/DL (ref 0.4–1.2)
EOSINOPHIL # BLD: 2.4 %
EOSINOPHILS ABSOLUTE: 0.1 THOU/MM3 (ref 0–0.4)
ERYTHROCYTE [DISTWIDTH] IN BLOOD BY AUTOMATED COUNT: 12.4 % (ref 11.5–14.5)
ERYTHROCYTE [DISTWIDTH] IN BLOOD BY AUTOMATED COUNT: 44.6 FL (ref 35–45)
GFR SERPL CREATININE-BSD FRML MDRD: > 90 ML/MIN/1.73M2
GLUCOSE BLD-MCNC: 103 MG/DL (ref 70–108)
HBA1C MFR BLD: 5.7 % (ref 4.4–6.4)
HCT VFR BLD CALC: 43.8 % (ref 42–52)
HDLC SERPL-MCNC: 26 MG/DL
HEMOGLOBIN: 14.9 GM/DL (ref 14–18)
IMMATURE GRANS (ABS): 0.01 THOU/MM3 (ref 0–0.07)
IMMATURE GRANULOCYTES: 0.2 %
LDL CHOLESTEROL CALCULATED: 78 MG/DL
LYMPHOCYTES # BLD: 30.6 %
LYMPHOCYTES ABSOLUTE: 1.3 THOU/MM3 (ref 1–4.8)
MCH RBC QN AUTO: 33 PG (ref 26–33)
MCHC RBC AUTO-ENTMCNC: 34 GM/DL (ref 32.2–35.5)
MCV RBC AUTO: 96.9 FL (ref 80–94)
MONOCYTES # BLD: 11.7 %
MONOCYTES ABSOLUTE: 0.5 THOU/MM3 (ref 0.4–1.3)
NUCLEATED RED BLOOD CELLS: 0 /100 WBC
PLATELET # BLD: 182 THOU/MM3 (ref 130–400)
PMV BLD AUTO: 10.3 FL (ref 9.4–12.4)
POC CREATININE WHOLE BLOOD: 0.7 MG/DL (ref 0.5–1.2)
POTASSIUM SERPL-SCNC: 4.6 MEQ/L (ref 3.5–5.2)
PROSTATE SPECIFIC ANTIGEN: 0.69 NG/ML (ref 0–1)
RBC # BLD: 4.52 MILL/MM3 (ref 4.7–6.1)
SEG NEUTROPHILS: 53.6 %
SEGMENTED NEUTROPHILS ABSOLUTE COUNT: 2.2 THOU/MM3 (ref 1.8–7.7)
SODIUM BLD-SCNC: 138 MEQ/L (ref 135–145)
T4 FREE: 1.06 NG/DL (ref 0.93–1.76)
TOTAL PROTEIN: 6.3 G/DL (ref 6.1–8)
TRIGLYCERIDE, FASTING: 305 MG/DL (ref 0–199)
TSH SERPL DL<=0.05 MIU/L-ACNC: 1.75 UIU/ML (ref 0.4–4.2)
WBC # BLD: 4.1 THOU/MM3 (ref 4.8–10.8)

## 2022-08-24 PROCEDURE — 82565 ASSAY OF CREATININE: CPT

## 2022-08-24 PROCEDURE — 6360000004 HC RX CONTRAST MEDICATION: Performed by: STUDENT IN AN ORGANIZED HEALTH CARE EDUCATION/TRAINING PROGRAM

## 2022-08-24 PROCEDURE — 80053 COMPREHEN METABOLIC PANEL: CPT

## 2022-08-24 PROCEDURE — 75635 CT ANGIO ABDOMINAL ARTERIES: CPT

## 2022-08-24 PROCEDURE — 84153 ASSAY OF PSA TOTAL: CPT

## 2022-08-24 PROCEDURE — 84443 ASSAY THYROID STIM HORMONE: CPT

## 2022-08-24 PROCEDURE — 85025 COMPLETE CBC W/AUTO DIFF WBC: CPT

## 2022-08-24 PROCEDURE — 80061 LIPID PANEL: CPT

## 2022-08-24 PROCEDURE — 84439 ASSAY OF FREE THYROXINE: CPT

## 2022-08-24 PROCEDURE — 83036 HEMOGLOBIN GLYCOSYLATED A1C: CPT

## 2022-08-24 PROCEDURE — 36415 COLL VENOUS BLD VENIPUNCTURE: CPT

## 2022-08-24 RX ADMIN — IOPAMIDOL 110 ML: 755 INJECTION, SOLUTION INTRAVENOUS at 07:00

## 2022-08-30 ENCOUNTER — SCHEDULED TELEPHONE ENCOUNTER (OUTPATIENT)
Dept: UROLOGY | Age: 82
End: 2022-08-30
Payer: MEDICARE

## 2022-08-30 PROCEDURE — 99442 PR PHYS/QHP TELEPHONE EVALUATION 11-20 MIN: CPT | Performed by: UROLOGY

## 2022-09-20 ENCOUNTER — OFFICE VISIT (OUTPATIENT)
Dept: CARDIOLOGY CLINIC | Age: 82
End: 2022-09-20
Payer: MEDICARE

## 2022-09-20 VITALS
BODY MASS INDEX: 31.14 KG/M2 | WEIGHT: 235 LBS | SYSTOLIC BLOOD PRESSURE: 158 MMHG | DIASTOLIC BLOOD PRESSURE: 82 MMHG | HEIGHT: 73 IN | HEART RATE: 72 BPM

## 2022-09-20 DIAGNOSIS — I10 PRIMARY HYPERTENSION: ICD-10-CM

## 2022-09-20 DIAGNOSIS — I25.10 CORONARY ARTERY DISEASE INVOLVING NATIVE CORONARY ARTERY OF NATIVE HEART WITHOUT ANGINA PECTORIS: Primary | ICD-10-CM

## 2022-09-20 DIAGNOSIS — I72.3 ILIAC ARTERY ANEURYSM (HCC): ICD-10-CM

## 2022-09-20 PROCEDURE — 1036F TOBACCO NON-USER: CPT | Performed by: NUCLEAR MEDICINE

## 2022-09-20 PROCEDURE — 1123F ACP DISCUSS/DSCN MKR DOCD: CPT | Performed by: NUCLEAR MEDICINE

## 2022-09-20 PROCEDURE — 99213 OFFICE O/P EST LOW 20 MIN: CPT | Performed by: NUCLEAR MEDICINE

## 2022-09-20 PROCEDURE — G8417 CALC BMI ABV UP PARAM F/U: HCPCS | Performed by: NUCLEAR MEDICINE

## 2022-09-20 PROCEDURE — G8427 DOCREV CUR MEDS BY ELIG CLIN: HCPCS | Performed by: NUCLEAR MEDICINE

## 2022-09-20 RX ORDER — BUPROPION HYDROCHLORIDE 150 MG/1
150 TABLET, EXTENDED RELEASE ORAL 2 TIMES DAILY
COMMUNITY

## 2022-09-20 NOTE — PROGRESS NOTES
[address] \[facility]\"" [address]  [address]  [facility] [address]  Dept: [phone]  Dept Fax: [phone]  Loc: [phone]    Visit Date: 9/20/2022    Loki Delgado is a 80 y.o. male who presents todayfor:  Chief Complaint   Patient presents with    Check-Up    Coronary Artery Disease    Hypertension    Hyperlipidemia   Had shoulder surgery   No chest pain  No changes in breathing  No new symptoms  Known RCA stents  Known severe PVD  Some claudication       HPI:  HPI  Past Medical History:   Diagnosis Date    Arrhythmia     BPH (benign prostatic hyperplasia)     Dr. Jenifer De Souza Ped now    CAD (coronary artery disease)     C SILENT ISCHEMIA    Depression     Dizziness - light-headed     Erectile dysfunction     Hyperlipidemia     Hypertension     Prostate cancer ([address] 3/7/2019    Tinnitus, subjective       Past Surgical History:   Procedure Laterality Date    CARDIAC CATHETERIZATION  12/20/2006    Multivessel CAD, diffuse in nature w/ more significant being RCA, which is site of ischemia. Normal LV function. EF 55%. CARDIOVASCULAR STRESS TEST  11/10/2006    Sinus rhythm. Frequent PVC's. No V-tach, SVT, or pauses. Abnormal Holter monitor. CARPAL TUNNEL RELEASE  01/2009    bilateral-Dr. Sarahi Pepe    CATARACT REMOVAL Bilateral 2015    Bilateral - staged    COLONOSCOPY      EYE SURGERY      HERNIA REPAIR  1970's    bilateral inguinal hernia    PROSTATE BIOPSY  07/2022    PROSTATE SURGERY      PTCA  12/2006    mid RCA    ROTATOR CUFF REPAIR           ROTATOR CUFF REPAIR  07/2018    Dr Tigre Hernández  12/06/2021    TRANSTHORACIC ECHOCARDIOGRAM  12/05/2006    Preserved LV size w/ systolic function at lower limits of normal. EF 50%. Possible bicuspal AV, consider PAULA. Mild MR and TR. No significant AS or AI at current time.      TURP  08/14/2014    TURP  09/01/2015     Family History   Problem Relation Age of Onset    Cancer Mother     Heart Disease Father     High Cholesterol Father     Cancer Other         kidney, colon, thyroid cancers-multiple siblings    Cancer Brother      Social History     Tobacco Use    Smoking status: Former     Packs/day: 1.50     Years: 3.00     Pack years: 4.50     Types: Cigarettes     Quit date: 1967     Years since quittin.2    Smokeless tobacco: Never   Substance Use Topics    Alcohol use: Yes     Alcohol/week: 0.0 standard drinks     Comment: socially- 2-3 beers/HS. Current Outpatient Medications   Medication Sig Dispense Refill    buPROPion (WELLBUTRIN SR) 150 MG extended release tablet Take 150 mg by mouth 2 times daily      VITAMIN D PO Take by mouth daily      losartan (COZAAR) 100 MG tablet Take 1 tablet by mouth once daily 90 tablet 5    atorvastatin (LIPITOR) 80 MG tablet Take 1 tablet by mouth once daily 90 tablet 5    amLODIPine (NORVASC) 5 MG tablet Take 1 tablet by mouth once daily 90 tablet 5    Multiple Vitamin TABS Take 1 tablet by mouth daily      clopidogrel (PLAVIX) 75 MG tablet Take 1 tablet by mouth daily 30 tablet 9    sildenafil (REVATIO) 20 MG tablet Use 3-5 tablets once daily as needed 30-60 minutes prior to intercourse. 30 tablet 11    Omega-3 Fatty Acids (FISH OIL) 1000 MG CAPS Take 2,000 mg by mouth in the morning. aspirin 81 MG tablet Take 81 mg by mouth in the morning. Misc Natural Products (GLUCOSAMINE CHONDROITIN ADV PO) Take 1 tablet by mouth daily      ibuprofen (ADVIL;MOTRIN) 800 MG tablet Take 800 mg by mouth as needed for Pain. Coenzyme Q10 (CO Q-10) 200 MG CAPS Take 1 capsule by mouth daily. cyclobenzaprine (FLEXERIL) 10 MG tablet TAKE 1/2 TO 1 (ONE-HALF TO ONE) TABLET BY MOUTH THREE TIMES DAILY AS NEEDED FOR MUSCLE SPASM (Patient not taking: No sig reported) 30 tablet 0     No current facility-administered medications for this visit.      No Known Allergies  Health Maintenance   Topic Date Due    COVID-19 Vaccine (4 - Booster for Pfizer series) 03/08/2022    Flu vaccine (1) 09/01/2022    Depression Monitoring  08/08/2023    Annual Wellness Visit (AWV)  08/09/2023    Lipids  08/24/2023    Prostate Specific Antigen (PSA) Screening or Monitoring  08/24/2023    DTaP/Tdap/Td vaccine (3 - Td or Tdap) 06/15/2027    Shingles vaccine  Completed    Pneumococcal 65+ years Vaccine  Completed    Hepatitis A vaccine  Aged Out    Hepatitis B vaccine  Aged Out    Hib vaccine  Aged Out    Meningococcal (ACWY) vaccine  Aged Out    Hepatitis C screen  Discontinued       Subjective:  Review of Systems  General:   No fever, no chills, No fatigue or weight loss  Pulmonary:    No dyspnea, no wheezing  Cardiac:    Denies recent chest pain,   GI:     No nausea or vomiting, no abdominal pain  Neuro:     No dizziness or light headedness,   Musculoskeletal:  No recent active issues  Extremities:   No edema, no obvious claudication     Objective:  Physical Exam  BP (!) 158/82   Pulse 72   Ht 6' 1\" (1.854 m)   Wt 235 lb (106.6 kg)   BMI 31.00 kg/m²   General:   Well developed, well nourished  Lungs:    Clear to auscultation  Heart:    Normal S1 S2, Slight murmur. no rubs, no gallops  Abdomen:   Soft, non tender, no organomegalies, positive bowel sounds  Extremities:   No edema, no cyanosis, good peripheral pulses  Neurological:   Awake, alert, oriented. No obvious focal deficits  Musculoskelatal:  No obvious deformities    Assessment:      Diagnosis Orders   1. Coronary artery disease involving native coronary artery of native heart without angina pectoris        2. Iliac artery aneurysm ([address]        3. Primary hypertension        As above  Cardiac fair for now   High TG   Not the best on diet     Plan:  No follow-ups on file. As above  Needs to monitor his diet  Start fibrates if needed  Continue risk factor modification and medical management  Thank you for allowing me to participate in the care of your patient.  Please don't hesitate to contact me regarding any further issues related to the patient care    Orders Placed:  No orders of the defined types were placed in this encounter. Medications Prescribed:  No orders of the defined types were placed in this encounter. Discussed use, benefit, and side effects of prescribed medications. All patient questions answered. Pt voicedunderstanding. Instructed to continue current medications, diet and exercise. Continue risk factor modification and medical management. Patient agreed with treatment plan. Follow up as directed.     Electronically signedby Leah Gonsalez MD on 9/20/2022 at 8:01 AM

## 2022-10-30 NOTE — PROGRESS NOTES
Raymond Butts is a 80 y.o. male evaluated via telephone on 8/30/2022 for prostate cancer      Documentation:  I communicated with the patient and/or health care decision maker about prostate cancer. Details of this discussion including any medical advice provided:     Intermediate risk  Low risk decipher  Hx of TURP  Discussed treatment vs cont AS  Will get psa in six months    Total Time: minutes: 11-20 minutes    Raymond Butts was evaluated through a synchronous (real-time) audio encounter. Patient identification was verified at the start of the visit. He (or guardian if applicable) is aware that this is a billable service, which includes applicable co-pays. This visit was conducted with the patient's (and/or legal guardian's) verbal consent. He has not had a related appointment within my department in the past 7 days or scheduled within the next 24 hours. The patient was located at Home: [facility] [address] AM [employer] [address]. The provider was located at [facility] ([address] [place], [place] [address]t): [address] [place] [address] [place] [facility]  [address]     Note: not billable if this call serves to triage the patient into an appointment for the relevant concern    Birdie Castro MD

## 2022-10-31 RX ORDER — CLOPIDOGREL BISULFATE 75 MG/1
75 TABLET ORAL DAILY
Qty: 30 TABLET | Refills: 11 | Status: SHIPPED | OUTPATIENT
Start: 2022-10-31

## 2022-11-08 ENCOUNTER — OFFICE VISIT (OUTPATIENT)
Dept: FAMILY MEDICINE CLINIC | Age: 82
End: 2022-11-08
Payer: MEDICARE

## 2022-11-08 VITALS
DIASTOLIC BLOOD PRESSURE: 88 MMHG | HEIGHT: 73 IN | HEART RATE: 68 BPM | BODY MASS INDEX: 30.67 KG/M2 | OXYGEN SATURATION: 97 % | TEMPERATURE: 97.3 F | WEIGHT: 231.4 LBS | SYSTOLIC BLOOD PRESSURE: 150 MMHG

## 2022-11-08 DIAGNOSIS — M62.830 BACK SPASM: ICD-10-CM

## 2022-11-08 DIAGNOSIS — E78.1 HYPERTRIGLYCERIDEMIA: Primary | ICD-10-CM

## 2022-11-08 DIAGNOSIS — M71.22 BAKER'S CYST OF KNEE, LEFT: ICD-10-CM

## 2022-11-08 PROCEDURE — 1123F ACP DISCUSS/DSCN MKR DOCD: CPT | Performed by: STUDENT IN AN ORGANIZED HEALTH CARE EDUCATION/TRAINING PROGRAM

## 2022-11-08 PROCEDURE — 3078F DIAST BP <80 MM HG: CPT | Performed by: STUDENT IN AN ORGANIZED HEALTH CARE EDUCATION/TRAINING PROGRAM

## 2022-11-08 PROCEDURE — 3074F SYST BP LT 130 MM HG: CPT | Performed by: STUDENT IN AN ORGANIZED HEALTH CARE EDUCATION/TRAINING PROGRAM

## 2022-11-08 PROCEDURE — G8484 FLU IMMUNIZE NO ADMIN: HCPCS | Performed by: STUDENT IN AN ORGANIZED HEALTH CARE EDUCATION/TRAINING PROGRAM

## 2022-11-08 PROCEDURE — G8427 DOCREV CUR MEDS BY ELIG CLIN: HCPCS | Performed by: STUDENT IN AN ORGANIZED HEALTH CARE EDUCATION/TRAINING PROGRAM

## 2022-11-08 PROCEDURE — G8417 CALC BMI ABV UP PARAM F/U: HCPCS | Performed by: STUDENT IN AN ORGANIZED HEALTH CARE EDUCATION/TRAINING PROGRAM

## 2022-11-08 PROCEDURE — 99213 OFFICE O/P EST LOW 20 MIN: CPT | Performed by: STUDENT IN AN ORGANIZED HEALTH CARE EDUCATION/TRAINING PROGRAM

## 2022-11-08 PROCEDURE — 1036F TOBACCO NON-USER: CPT | Performed by: STUDENT IN AN ORGANIZED HEALTH CARE EDUCATION/TRAINING PROGRAM

## 2022-11-08 RX ORDER — CYCLOBENZAPRINE HCL 10 MG
TABLET ORAL
Qty: 30 TABLET | Refills: 0 | Status: SHIPPED | OUTPATIENT
Start: 2022-11-08

## 2022-11-08 ASSESSMENT — ENCOUNTER SYMPTOMS
BACK PAIN: 1
SINUS PRESSURE: 0
SHORTNESS OF BREATH: 0
COUGH: 0
VOMITING: 0
NAUSEA: 0
ABDOMINAL PAIN: 0
DIARRHEA: 0

## 2022-11-08 NOTE — PROGRESS NOTES
Health Maintenance Due   Topic Date Due    COVID-19 Vaccine (4 - Booster for Pfizer series) 01/03/2022    Flu vaccine (1) 08/01/2022

## 2022-11-08 NOTE — PATIENT INSTRUCTIONS
LAB INSTRUCTIONS:    Please complete labs in 2 month(s). Please fast for 8-10 hours prior to lab collection. The clinic will call you within 1 week of collection. If you have not heard from us within that amount of time, please call us at [phone]. Please check blood pressure for 2 weeks and report findings to our office. Thank you!

## 2022-11-08 NOTE — PROGRESS NOTES
[address] [facility] [address] [facility] [address] [address]  Dept: [phone]  Loc: [phone]      Please see Resident note for complete HPI. 6 month follow up. Labs reviewed. Elevated triglycerides. Follows with Dr. Liss Ohara. Working on diet. Weak stream. Hx of prostate cancer s/p prostatectomy. Follows with urology. Elevated blood pressure. Hx of white coat HTN. Continues amlodipine and losartan.       ROS per Resident    Lab Results   Component Value Date    WBC 4.1 (L) 08/24/2022    HGB 14.9 08/24/2022    HCT 43.8 08/24/2022    MCV 96.9 (H) 08/24/2022     08/24/2022     Lab Results   Component Value Date     08/24/2022    K 4.6 08/24/2022     08/24/2022    CO2 29 08/24/2022    BUN 15 08/24/2022    CREATININE 0.5 08/24/2022    GLUCOSE 103 08/24/2022    CALCIUM 9.4 08/24/2022    PROT 6.3 08/24/2022    LABALBU 3.8 08/24/2022    BILITOT 0.6 08/24/2022    ALKPHOS 89 08/24/2022    AST 39 08/24/2022    ALT 57 08/24/2022    LABGLOM >90 08/24/2022     Lab Results   Component Value Date    LABA1C 5.7 08/24/2022     No results found for: EAG  No results found for: LABMICR, [id number]  Lab Results   Component Value Date    TSH 1.750 08/24/2022    T4FREE 1.06 08/24/2022     Lab Results   Component Value Date    CHOL 164 08/20/2021    CHOL 169 08/11/2020    CHOL 176 08/06/2019     Lab Results   Component Value Date    TRIG 166 01/12/2022    TRIG 290 (H) 08/20/2021    TRIG 184 08/11/2020     Lab Results   Component Value Date    HDL 26 08/24/2022    HDL 32 01/12/2022    HDL 28 08/20/2021     Lab Results   Component Value Date    LDLCALC 78 08/24/2022    LDLCALC 78 08/20/2021    LDLCALC 97 08/11/2020     Lab Results   Component Value Date    LABVLDL 56 (H) 08/06/2019    LABVLDL 43 (H) 06/04/2018    LABVLDL 59 (H) 08/27/2015     Lab Results   Component Value Date    CHOLHDLRATIO 5.9 (H) 08/06/2019    CHOLHDLRATIO 5.1 (H) 06/04/2018    CHOLHDLRATIO 6.1 (H) 08/27/2015     Lab Results   Component Value Date    PSA 0.69 08/24/2022    PSA 0.75 05/27/2022    PSA 0.71 03/16/2021       Health Maintenance Due   Topic Date Due    COVID-19 Vaccine (4 - Booster for Pfizer series) 01/03/2022    Flu vaccine (1) 08/01/2022         Physical Exam per Resident       ICD-10-CM    1. Hypertriglyceridemia  E78.1 Lipid, Fasting      2. Back spasm  M62.830 cyclobenzaprine (FLEXERIL) 10 MG tablet            Plan  I participated in the discussion and care of this patient   - Continue diet modifications and fish oil  - Recheck FLPs, consider Fibrates in the future if needed  - Follow up with urology sooner   - Ambulator blood pressure monitoring. Adjust medications as needed. - Encouraged vaccines, declining at this time.

## 2022-11-08 NOTE — PROGRESS NOTES
Attending attestation:  I personally  participated in key or critical portions of the management including  medical decision making.   I verify the accuracy of the documentation by the resident with the following addition or changes:        Electronically signed by Magdalene Man MD on 11/8/2022 at 9:11 AM

## 2022-11-08 NOTE — PROGRESS NOTES
[address] [facility] [address] [facility] [address] [address]  Dept: [phone]  Dept Fax: [phone]  Loc: [phone]  PROGRESS NOTE      Visit Date: 11/8/2022    Len Hernadez is a 80 y.o. male who presents today for:  Chief Complaint   Patient presents with    3 Month Follow-Up       For chronic conditions    Mass     Behind the knee       Impression/Plan:  1. Hypertriglyceridemia  Chronic, uncontrolled  Continue diet and lifestyle modifications  Continue statin  May need to add fibrate if still elevated in January   - Triglyceride; Future    2. Back spasm  Chronic, controlled with Flexeril  Continue Flexeril as needed  Encourage stretches and exercises  - cyclobenzaprine (FLEXERIL) 10 MG tablet; TAKE 1/2 TO 1 (ONE-HALF TO ONE) TABLET BY MOUTH THREE TIMES DAILY AS NEEDED FOR MUSCLE SPASM  Dispense: 30 tablet; Refill: 0    3. Baker's cyst of knee, left  Acute  Referral made to Ortho  - ELIA - Gemini Davila MD, Orthopedic Surgery, [address] [place] [address]    Return in about 6 months (around 5/8/2023) for Chronic. Subjective:  HPI    Here for follow-up. Doing well overall, no new complaints today. Hypertriglyceridemia: Noted on recent labs. Seen by cardiology recently and encouraged to make diet and lifestyle modifications. If there is no improvement, they recommend starting fibrate. He has been working on cutting out carbs. We will recheck his labs in January to see if his triglycerides have improved. He is amenable to starting a fibrate if he needs to. History of back spasm: Well-controlled on Flexeril as needed. He does not take very often. He would like a refill to have as needed. Denies any pain today. Baker's cyst of left knee: Noted on CTA earlier this year. He notices what feels like a lump on the back of his knee. He would like to talk to Ortho about it to discuss his options.   Denies any pain, redness, or swelling to this area.    No further questions of complaints. Review of Systems   Constitutional:  Negative for chills and fever. HENT:  Negative for congestion and sinus pressure. Eyes:  Negative for visual disturbance. Respiratory:  Negative for cough and shortness of breath. Cardiovascular:  Negative for chest pain and palpitations. Gastrointestinal:  Negative for abdominal pain, diarrhea, nausea and vomiting. Genitourinary:  Negative for dysuria. Musculoskeletal:  Positive for back pain (Intermittent). Lump on the back of his left knee   Skin:  Negative for rash. Neurological:  Negative for dizziness, light-headedness and headaches. Psychiatric/Behavioral:  Negative for dysphoric mood. The patient is not nervous/anxious. Patient Active Problem List   Diagnosis    Essential hypertension    Hyperlipidemia    Arrhythmia    Tinnitus, subjective    ED (erectile dysfunction)    Depression    Family history of kidney cancer    Urinary urgency    History of adenomatous polyp of colon    Stone, prostate    Bladder stone    BPH with obstruction/lower urinary tract symptoms    Coronary artery disease involving native heart without angina pectoris    Macular degeneration- Dr. Bozena Munoz    Prostate cancer [facility])    Iliac artery aneurysm [facility])     Past Medical History:   Diagnosis Date    Arrhythmia     BPH (benign prostatic hyperplasia)     Dr. Emilie Barrios now    CAD (coronary artery disease)     C SILENT ISCHEMIA    Depression     Dizziness - light-headed     Erectile dysfunction     Hyperlipidemia     Hypertension     Prostate cancer ([facility] [address] 3/7/2019    Tinnitus, subjective       Past Surgical History:   Procedure Laterality Date    CARDIAC CATHETERIZATION  12/20/2006    Multivessel CAD, diffuse in nature w/ more significant being RCA, which is site of ischemia. Normal LV function. EF 55%. CARDIOVASCULAR STRESS TEST  11/10/2006    Sinus rhythm. Frequent PVC's. No V-tach, SVT, or pauses.  Abnormal Holter monitor. CARPAL TUNNEL RELEASE  2009    bilateral-Dr. Nieto Layer    CATARACT REMOVAL Bilateral 2015    Bilateral - staged    COLONOSCOPY      EYE SURGERY      HERNIA REPAIR  's    bilateral inguinal hernia    PROSTATE BIOPSY  2022    PROSTATE SURGERY      PTCA  2006    mid RCA    ROTATOR CUFF REPAIR           ROTATOR CUFF REPAIR  2018    Dr Nette Lara  2021    TRANSTHORACIC ECHOCARDIOGRAM  2006    Preserved LV size w/ systolic function at lower limits of normal. EF 50%. Possible bicuspal AV, consider PAULA. Mild MR and TR. No significant AS or AI at current time. TURP  2014    TURP  2015     Family History   Problem Relation Age of Onset    Cancer Mother     Heart Disease Father     High Cholesterol Father     Cancer Other         kidney, colon, thyroid cancers-multiple siblings    Cancer Brother      Social History     Tobacco Use    Smoking status: Former     Packs/day: 1.50     Years: 3.00     Pack years: 4.50     Types: Cigarettes     Quit date: 1967     Years since quittin.3    Smokeless tobacco: Never   Substance Use Topics    Alcohol use: Yes     Alcohol/week: 0.0 standard drinks     Comment: socially- 2-3 beers/HS.        Current Outpatient Medications   Medication Sig Dispense Refill    cyclobenzaprine (FLEXERIL) 10 MG tablet TAKE 1/2 TO 1 (ONE-HALF TO ONE) TABLET BY MOUTH THREE TIMES DAILY AS NEEDED FOR MUSCLE SPASM 30 tablet 0    clopidogrel (PLAVIX) 75 MG tablet Take 1 tablet by mouth daily 30 tablet 11    buPROPion (WELLBUTRIN SR) 150 MG extended release tablet Take 150 mg by mouth 2 times daily      VITAMIN D PO Take by mouth daily      losartan (COZAAR) 100 MG tablet Take 1 tablet by mouth once daily 90 tablet 5    atorvastatin (LIPITOR) 80 MG tablet Take 1 tablet by mouth once daily 90 tablet 5    amLODIPine (NORVASC) 5 MG tablet Take 1 tablet by mouth once daily 90 tablet 5    Multiple Vitamin TABS Take 1 tablet by mouth daily      sildenafil (REVATIO) 20 MG tablet Use 3-5 tablets once daily as needed 30-60 minutes prior to intercourse. 30 tablet 11    Omega-3 Fatty Acids (FISH OIL) 1000 MG CAPS Take 2,000 mg by mouth in the morning. aspirin 81 MG tablet Take 81 mg by mouth in the morning. Misc Natural Products (GLUCOSAMINE CHONDROITIN ADV PO) Take 1 tablet by mouth daily      ibuprofen (ADVIL;MOTRIN) 800 MG tablet Take 800 mg by mouth as needed for Pain. Coenzyme Q10 (CO Q-10) 200 MG CAPS Take 1 capsule by mouth daily. No current facility-administered medications for this visit.      No Known Allergies    Immunization History   Administered Date(s) Administered    COVID-19, PFIZER PURPLE top, DILUTE for use, (age 15 y+), 30mcg/0.3mL 02/16/2021, 03/09/2021, 11/08/2021    Influenza 10/01/2009, 11/08/2012, 11/12/2013    Influenza Virus Vaccine 10/01/2009, 12/05/2014, 11/10/2015, 12/03/2018    Influenza, AFLURIA (age 1 yrs+), FLUZONE, (age 10 mo+), MDV, 0.5mL 12/08/2016    Influenza, FLUAD, (age 72 y+), Adjuvanted, 0.5mL 10/22/2020, 10/25/2021    Influenza, High Dose (Fluzone 65 yrs and older) 10/22/2020    Influenza, Triv, inactivated, subunit, adjuvanted, IM (Fluad 65 yrs and older) 12/03/2018, 01/08/2020    Pneumococcal Conjugate 13-valent ([id number]) 11/10/2015    Pneumococcal Polysaccharide ([id number]) 11/15/2007    Tdap (Boostrix, Adacel) 05/01/2007, 06/15/2017    Zoster Live (Zostavax) 12/03/2009    Zoster Recombinant (Shingrix) 12/10/2018, 07/08/2019     Health Maintenance   Topic Date Due    COVID-19 Vaccine (4 - Booster for Pfizer series) 01/03/2022    Flu vaccine (1) 08/01/2022    Depression Monitoring  08/08/2023    Annual Wellness Visit (AWV)  08/09/2023    Lipids  08/24/2023    Prostate Specific Antigen (PSA) Screening or Monitoring  08/24/2023    DTaP/Tdap/Td vaccine (3 - Td or Tdap) 06/15/2027    Shingles vaccine  Completed    Pneumococcal 65+ years Vaccine  Completed    Hepatitis A vaccine Aged Out    Hib vaccine  Aged Out    Meningococcal (ACWY) vaccine  Aged Out    Hepatitis C screen  Discontinued       LABS  Lab Results   Component Value Date    LABA1C 5.7 08/24/2022     No results found for: EAG  No components found for: CHLPL  Lab Results   Component Value Date    TRIG 166 01/12/2022    TRIG 290 (H) 08/20/2021    TRIG 184 08/11/2020     Lab Results   Component Value Date    HDL 26 08/24/2022    HDL 32 01/12/2022    HDL 28 08/20/2021     Lab Results   Component Value Date    LDLCALC 78 08/24/2022    LDLCALC 78 08/20/2021    LDLCALC 97 08/11/2020       Chemistry        Component Value Date/Time     08/24/2022 0724    K 4.6 08/24/2022 0724     08/24/2022 0724    CO2 29 08/24/2022 0724    BUN 15 08/24/2022 0724    CREATININE 0.5 08/24/2022 0724        Component Value Date/Time    CALCIUM 9.4 08/24/2022 0724    ALKPHOS 89 08/24/2022 0724    AST 39 08/24/2022 0724    ALT 57 08/24/2022 0724    BILITOT 0.6 08/24/2022 0724          No results found for: LABMICR, [id number]  Lab Results   Component Value Date    TSH 1.750 08/24/2022     Lab Results   Component Value Date    PSA 0.69 08/24/2022    PSA 0.75 05/27/2022    PSA 0.71 03/16/2021     Lab Results   Component Value Date    WBC 4.1 (L) 08/24/2022    HGB 14.9 08/24/2022    HCT 43.8 08/24/2022    MCV 96.9 (H) 08/24/2022     08/24/2022       Objective:  BP (!) 150/88 (Site: Left Upper Arm, Position: Sitting, Cuff Size: Medium Adult)   Pulse 68   Temp 97.3 °F (36.3 °C) (Temporal)   Ht 6' 1\" (1.854 m)   Wt 231 lb 6.4 oz (105 kg)   SpO2 97%   BMI 30.53 kg/m²     Physical Exam  Constitutional:       Appearance: Normal appearance. HENT:      Head: Normocephalic. Nose: No congestion. Mouth/Throat:      Mouth: Mucous membranes are moist.   Eyes:      Extraocular Movements: Extraocular movements intact. Cardiovascular:      Rate and Rhythm: Normal rate and regular rhythm. Pulses: Normal pulses.       Heart sounds: Normal heart sounds. Pulmonary:      Effort: Pulmonary effort is normal.      Breath sounds: Normal breath sounds. No wheezing. Abdominal:      General: Abdomen is flat. Bowel sounds are normal.      Palpations: Abdomen is soft. Musculoskeletal:         General: Normal range of motion. Cervical back: Normal range of motion. Legs:    Skin:     General: Skin is warm and dry. Neurological:      General: No focal deficit present. Mental Status: He is alert and oriented to person, place, and time. Psychiatric:         Mood and Affect: Mood normal.       They voiced understanding. All questions answered. They agreed with treatment plan. See patient instructions for any educational materials that may have been given. Discussed use, benefit, and side effects of prescribed medications. Reviewed health maintenance.     (Please note that portions of this note may have been completed with a voice recognition program.  Efforts were made to edit the dictation but occasionally words are mis-transcribed.)      Electronically signed by Segun De La Torre DO on 11/8/2022 at 4:15 PM

## 2023-03-10 ENCOUNTER — NURSE ONLY (OUTPATIENT)
Dept: LAB | Age: 83
End: 2023-03-10

## 2023-03-10 DIAGNOSIS — C61 PROSTATE CANCER (HCC): ICD-10-CM

## 2023-03-10 LAB — PSA SERPL-MCNC: 0.97 NG/ML (ref 0–1)

## 2023-03-13 ENCOUNTER — TELEPHONE (OUTPATIENT)
Dept: UROLOGY | Age: 83
End: 2023-03-13

## 2023-03-13 NOTE — TELEPHONE ENCOUNTER
Patient called in and cancelled his appt today 3/13/2023 with Alina Maguire, he has a migraine and is not feeling well. He asked is someone or Alina Maguire could just call him to go over his recent lab results and then schedule follow up from there depending on Coreen's next recommendations? Please call him to advise.

## 2023-03-17 ENCOUNTER — TELEPHONE (OUTPATIENT)
Dept: UROLOGY | Age: 83
End: 2023-03-17

## 2023-03-17 DIAGNOSIS — C61 PROSTATE CANCER (HCC): Primary | ICD-10-CM

## 2023-03-17 NOTE — TELEPHONE ENCOUNTER
Patient advised of the PSA results, to repeat it in 3 months and appointment scheduled to review. He voiced understanding.

## 2023-03-17 NOTE — TELEPHONE ENCOUNTER
PSA is up slightly at 0.97. Recommend repeat PSA in 3 months. Follow-up appointment scheduled in 3 months.

## 2023-03-29 ENCOUNTER — HOSPITAL ENCOUNTER (OUTPATIENT)
Dept: PHYSICAL THERAPY | Age: 83
Setting detail: THERAPIES SERIES
Discharge: HOME | End: 2023-03-29
Payer: MEDICARE

## 2023-03-29 PROCEDURE — 97161 PT EVAL LOW COMPLEX 20 MIN: CPT

## 2023-03-29 PROCEDURE — 97110 THERAPEUTIC EXERCISES: CPT

## 2023-03-29 RX ORDER — MELOXICAM 15 MG/1
15 TABLET ORAL DAILY
COMMUNITY

## 2023-03-29 NOTE — PROGRESS NOTES
Modified Independent Modified Independent   Community Integration Modified Independent Modified Independent   Driving Active  Active    Work Retired  Retired     Objective:    OBSERVATION   Pain 5/10 right hip; 3/10 LBP   Palpation No tenderness with palpation to lumbar paraspinals, greater trochanters or IT band   Sensation WNL   Edema No   Bed Mobility Indep   Transfers Modified Indep   Ambulation Modified Indep straight cane (adjusted height of cane today)   Stairs Not today   Balance No imbalance today with gait or transfers       POSTURE    No Deficit Deficit Comments   Forward Head  x    Rounded Shoulders  x    Kyphosis  x    Lordosis x     Lateral Shift x     Scoliosis  x        TRUNK RANGE OF MOTION   Flexion:  Moderate restriction; tight   Extension: Major restriction; tight   Lateral Flexion Left: Moderate restriction; tight   Lateral Flexion Right: Moderate restriction; tight   Rotation Left: Moderate restriction; tight   Rotation Right: Moderate restriction; tight   Trunk Range of Motion is [facility]  []     LOWER EXTREMITY RANGE OF MOTION    Left Right Comments   Hip Flexion knee to chest      Hip Extension [facility]    Hip ABDuction [facility]    Hip ADDuction [facility]    Hip Internal Rotation 30 25    Hip External Rotation 30 25    Hip Range of Motion is [facility]  []      Knee Flexion [facility] WFL    Knee Extension [facility] WFL    Knee Range of Motion is [facility]  []       LOWER EXTREMITY STRENGTH    Left Right Comments   Hip Flexion      Hip Abduction      Hip Adduction      Hip Extension      Hip External Rotation      Knee Flexion      Knee Extension      Ankle DF      Ankle PF      LE strength is [facility] [x]      SPECIAL TESTS (+/-)    Left Right Comments   Ely + +        TREATMENT   Precautions:    Pain:     \"X in shaded column indicates activity completed today    *\" next to exercise/intervention indicates progression   Modalities Parameters/  Location  Notes                     Manual Therapy Time/Technique  Notes

## 2023-04-12 ENCOUNTER — HOSPITAL ENCOUNTER (OUTPATIENT)
Dept: PHYSICAL THERAPY | Age: 83
Setting detail: THERAPIES SERIES
End: 2023-04-12
Payer: MEDICARE

## 2023-04-18 ENCOUNTER — HOSPITAL ENCOUNTER (OUTPATIENT)
Dept: PHYSICAL THERAPY | Age: 83
Setting detail: THERAPIES SERIES
Discharge: HOME | End: 2023-04-18
Payer: MEDICARE

## 2023-04-18 PROCEDURE — 97110 THERAPEUTIC EXERCISES: CPT

## 2023-04-18 NOTE — PROGRESS NOTES
[facility]  PHYSICAL THERAPY  [] EVALUATION  [x] DAILY NOTE (LAND) [] DAILY NOTE (AQUATIC ) [] PROGRESS NOTE [] DISCHARGE NOTE    [x] OUTPATIENT REHABILITATION [facility]   [] [place] [address]    [] [facility]   [] Savana Hazard    Date: 2023  Patient Name:  Alysia Stacy  : 1940  MRN: [id number]  CSN: [id number]    Referring Practitioner Alton Davila MD   Diagnosis Unilateral primary osteoarthritis, right hip [M16.11]    Treatment Diagnosis OA right hip. Lumbago. Date of Evaluation 3/29/23    Additional Pertinent History CAD.  HTN. Dizziness. Tinnitus. Cardiac catheterization.  TURP. Right reverse shoulder replacement. Left RCR, SAD, DCE, biceps tenodesis, debridement. (States he may have metal hardware in right shoulder, but metal detectors are not activated.)  Metal stents in heart. Functional Outcome Measure Used Modified KARIN   Functional Outcome Score deferred (3/29/23)       Insurance: Primary: Payor: MEDICARE /  /  / ,   Secondary: [facility]   Authorization Information: PRECERTIFICATION REQUIRED:  No  INSURANCE THERAPY BENEFIT:  Patient has unlimited visits based on medical necessity  Benefit will not cover maintenance or preventative treatment. AQUATIC THERAPY COVERED:   Yes  MODALITIES COVERED:  Yes, with the exception of iontophoresis and hot packs/cold packs  TELEHEALTH COVERED: Yes   Visit # 3, 3/10 for progress note   Visits Allowed: Unlimited per medical necessity   Recertification Date: 2151   Physician Follow-Up: Per patient   Physician Orders:    History of Present Illness: Florinda Presley is a retired  and [employer]. presents with c/o right hip pain 5/10 that began in 2022, noticing he was struggling to stand up from chairs. Denies falls in the past year. Presents today using a straight cane. States his wife is a retired nurse.   States he has been told he has bone-on-bone in

## 2023-04-21 ENCOUNTER — HOSPITAL ENCOUNTER (OUTPATIENT)
Dept: PHYSICAL THERAPY | Age: 83
Setting detail: THERAPIES SERIES
Discharge: HOME | End: 2023-04-21
Payer: MEDICARE

## 2023-04-21 PROCEDURE — 97110 THERAPEUTIC EXERCISES: CPT

## 2023-04-21 NOTE — PROGRESS NOTES
[address] [facility]  PHYSICAL THERAPY  [] EVALUATION  [x] DAILY NOTE (LAND) [] DAILY NOTE (AQUATIC ) [] PROGRESS NOTE [] DISCHARGE NOTE    [x] OUTPATIENT REHABILITATION [facility]   [] [place] [address]    [] [facility]   [] Nazario Oh    Date: 2023  Patient Name:  Dickson Bloton  : 1940  MRN: [id number]  CSN: [id number]    Referring Practitioner Rachel Shipman MD   Diagnosis Unilateral primary osteoarthritis, right hip [M16.11]    Treatment Diagnosis OA right hip. Lumbago. Date of Evaluation 3/29/23    Additional Pertinent History CAD.  HTN. Dizziness. Tinnitus. Cardiac catheterization.  TURP. Right reverse shoulder replacement. Left RCR, SAD, DCE, biceps tenodesis, debridement. (States he may have metal hardware in right shoulder, but metal detectors are not activated.)  Metal stents in heart. Functional Outcome Measure Used Modified KARIN   Functional Outcome Score deferred (3/29/23)       Insurance: Primary: Payor: MEDICARE /  /  / ,   Secondary: [facility]   Authorization Information: PRECERTIFICATION REQUIRED:  No  INSURANCE THERAPY BENEFIT:  Patient has unlimited visits based on medical necessity  Benefit will not cover maintenance or preventative treatment. AQUATIC THERAPY COVERED:   Yes  MODALITIES COVERED:  Yes, with the exception of iontophoresis and hot packs/cold packs  TELEHEALTH COVERED: Yes   Visit # 4, 4/10 for progress note   Visits Allowed: Unlimited per medical necessity   Recertification Date:    Physician Follow-Up: Per patient   Physician Orders:    History of Present Illness: Kemal Munoz is a retired  and [employer]. presents with c/o right hip pain 5/10 that began in 2022, noticing he was struggling to stand up from chairs. Denies falls in the past year. Presents today using a straight cane. States his wife is a retired nurse.   States he has been told he has bone-on-bone in

## 2023-04-24 ENCOUNTER — HOSPITAL ENCOUNTER (OUTPATIENT)
Dept: PHYSICAL THERAPY | Age: 83
Setting detail: THERAPIES SERIES
Discharge: HOME | End: 2023-04-24
Payer: MEDICARE

## 2023-04-24 PROCEDURE — 97110 THERAPEUTIC EXERCISES: CPT

## 2023-04-24 NOTE — PROGRESS NOTES
[address] [facility]  PHYSICAL THERAPY  [] EVALUATION  [x] DAILY NOTE (LAND) [] DAILY NOTE (AQUATIC ) [] PROGRESS NOTE [] DISCHARGE NOTE    [x] OUTPATIENT REHABILITATION [facility]   [] [place] [address]    [] [facility]   [] Sherry Ross    Date: 2023  Patient Name:  Dario Maier  : 1940  MRN: [id number]  CSN: [id number]    Referring Practitioner Milton Scanlon MD   Diagnosis Unilateral primary osteoarthritis, right hip [M16.11]    Treatment Diagnosis OA right hip. Lumbago. Date of Evaluation 3/29/23    Additional Pertinent History CAD.  HTN. Dizziness. Tinnitus. Cardiac catheterization.  TURP. Right reverse shoulder replacement. Left RCR, SAD, DCE, biceps tenodesis, debridement. (States he may have metal hardware in right shoulder, but metal detectors are not activated.)  Metal stents in heart. Functional Outcome Measure Used Modified KARIN   Functional Outcome Score deferred (3/29/23)       Insurance: Primary: Payor: MEDICARE /  /  / ,   Secondary: [facility]   Authorization Information: PRECERTIFICATION REQUIRED:  No  INSURANCE THERAPY BENEFIT:  Patient has unlimited visits based on medical necessity  Benefit will not cover maintenance or preventative treatment. AQUATIC THERAPY COVERED:   Yes  MODALITIES COVERED:  Yes, with the exception of iontophoresis and hot packs/cold packs  TELEHEALTH COVERED: Yes   Visit # 5, 5/10 for progress note   Visits Allowed: Unlimited per medical necessity   Recertification Date:    Physician Follow-Up: Per patient   Physician Orders:    History of Present Illness: Colleen Gillespie is a retired  and [employer]. presents with c/o right hip pain 5/10 that began in 2022, noticing he was struggling to stand up from chairs. Denies falls in the past year. Presents today using a straight cane. States his wife is a retired nurse.   States he has been told he has bone-on-bone in

## 2023-04-27 ENCOUNTER — HOSPITAL ENCOUNTER (OUTPATIENT)
Dept: PHYSICAL THERAPY | Age: 83
Setting detail: THERAPIES SERIES
Discharge: HOME | End: 2023-04-27
Payer: MEDICARE

## 2023-04-27 PROCEDURE — 97110 THERAPEUTIC EXERCISES: CPT

## 2023-04-27 NOTE — PROGRESS NOTES
his left knee (and has steroid shot into L knee every 3 months), and that his right knee is actually strong and without much impairment. C/o tingling in right upper extremity. Todd Chen has history of right shoulder replacement, and also left RCR and biceps tenodesis.)  Also c/o intermittent low back pain. \"My low back is pretty well shot (degenerated). 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 States he goes to see his chiropractor for 10 years and goes as needed which has not been since 3 years ago. States he retired this year on [employer], and currently is considering options for recreation and hobbies. Currently not exercising, and has not used swimming pools much in the past.  States he felt SOB when swimming -- prior to his cardiac catheterization. SUBJECTIVE: Patient reports he is doing exercises about every other day at home and they are going well. States he usually feels \"worked over\" after therapy sessions.        TREATMENT   Precautions:    Pain: Denies     \"X in shaded column indicates activity completed today    *\" next to exercise/intervention indicates progression   Modalities Parameters/  Location  Notes                     Manual Therapy Time/Technique  Notes                     Exercise/Intervention   Notes   BASELINE: 5/10 right hip pain and 3/10 LBP       Prone:        Prone lying STOPPED   DISCONTINUED per discomfort   Prone elbow props (per psoas and lumbar stretch) STOPPED   DISCONTINUED per discomfort          Supine:        Piriformis stretch pushing away/pulling to contralateral axilla   STOPPED   DISCONTINUED per pain   Bent knee fallout (bilateral, unilateral)  [id number]  X    Resisted HL hip abduction (bilateral, unilateral)- Orange *  10x3s      Iso hip adduction (ball squeeze)  [id number]  X    RLE heel slides with use of slide board and towel  2x12  X    TA + marches  15x  X Cueing to not push into pain    TA activation  10x5s  X
Alert-The patient is alert, awake and responds to voice. The patient is oriented to time, place, and person. The triage nurse is able to obtain subjective information.

## 2023-05-02 ENCOUNTER — HOSPITAL ENCOUNTER (OUTPATIENT)
Dept: PHYSICAL THERAPY | Age: 83
Setting detail: THERAPIES SERIES
Discharge: HOME | End: 2023-05-02
Payer: MEDICARE

## 2023-05-02 PROCEDURE — 97110 THERAPEUTIC EXERCISES: CPT

## 2023-05-02 NOTE — PROGRESS NOTES
[address] [facility]  PHYSICAL THERAPY  [] EVALUATION  [x] DAILY NOTE (LAND) [] DAILY NOTE (AQUATIC ) [] PROGRESS NOTE [] DISCHARGE NOTE    [x] OUTPATIENT REHABILITATION [facility]   [] [place] [address]    [] [facility]   [] Jacintoshania Castle    Date: 2023  Patient Name:  Bibi Macdonald  : 1940  MRN: [id number]  CSN: [id number]    Referring Practitioner Maureen Moyer MD   Diagnosis Unilateral primary osteoarthritis, right hip [M16.11]    Treatment Diagnosis OA right hip. Lumbago. Date of Evaluation 3/29/23    Additional Pertinent History CAD.  HTN. Dizziness. Tinnitus. Cardiac catheterization.  TURP. Right reverse shoulder replacement. Left RCR, SAD, DCE, biceps tenodesis, debridement. (States he may have metal hardware in right shoulder, but metal detectors are not activated.)  Metal stents in heart. Functional Outcome Measure Used Modified KARIN   Functional Outcome Score deferred (3/29/23)       Insurance: Primary: Payor: MEDICARE /  /  / ,   Secondary: [facility]   Authorization Information: PRECERTIFICATION REQUIRED:  No  INSURANCE THERAPY BENEFIT:  Patient has unlimited visits based on medical necessity  Benefit will not cover maintenance or preventative treatment. AQUATIC THERAPY COVERED:   Yes  MODALITIES COVERED:  Yes, with the exception of iontophoresis and hot packs/cold packs  TELEHEALTH COVERED: Yes   Visit # 7, 7/10 for progress note   Visits Allowed: Unlimited per medical necessity   Recertification Date:    Physician Follow-Up: Per patient   Physician Orders:    History of Present Illness: Song Sarah is a retired  and [employer]. presents with c/o right hip pain 5/10 that began in 2022, noticing he was struggling to stand up from chairs. Denies falls in the past year. Presents today using a straight cane. States his wife is a retired nurse.   States he has been told he has bone-on-bone in

## 2023-05-05 ENCOUNTER — HOSPITAL ENCOUNTER (OUTPATIENT)
Dept: PHYSICAL THERAPY | Age: 83
Setting detail: THERAPIES SERIES
Discharge: HOME | End: 2023-05-05
Payer: MEDICARE

## 2023-05-05 ENCOUNTER — NURSE ONLY (OUTPATIENT)
Dept: LAB | Age: 83
End: 2023-05-05

## 2023-05-05 DIAGNOSIS — E78.1 HYPERTRIGLYCERIDEMIA: ICD-10-CM

## 2023-05-05 LAB — TRIGL SERPL-MCNC: 324 MG/DL (ref 0–199)

## 2023-05-05 PROCEDURE — 97110 THERAPEUTIC EXERCISES: CPT

## 2023-05-05 NOTE — PROGRESS NOTES
[address] [facility]  PHYSICAL THERAPY  [] EVALUATION  [x] DAILY NOTE (LAND) [] DAILY NOTE (AQUATIC ) [] PROGRESS NOTE [] DISCHARGE NOTE    [x] OUTPATIENT REHABILITATION [facility]   [] [place] [address]    [] [facility]   [] Kody Yadav    Date: 2023  Patient Name:  Simuel Klinefelter  : 1940  MRN: [id number]  CSN: [id number]    Referring Practitioner Ryan Christensen MD   Diagnosis Unilateral primary osteoarthritis, right hip [M16.11]    Treatment Diagnosis OA right hip. Lumbago. Date of Evaluation 3/29/23    Additional Pertinent History CAD.  HTN. Dizziness. Tinnitus. Cardiac catheterization.  TURP. Right reverse shoulder replacement. Left RCR, SAD, DCE, biceps tenodesis, debridement. (States he may have metal hardware in right shoulder, but metal detectors are not activated.)  Metal stents in heart. Functional Outcome Measure Used Modified KARIN   Functional Outcome Score deferred (3/29/23)       Insurance: Primary: Payor: MEDICARE /  /  / ,   Secondary: [facility]   Authorization Information: PRECERTIFICATION REQUIRED:  No  INSURANCE THERAPY BENEFIT:  Patient has unlimited visits based on medical necessity  Benefit will not cover maintenance or preventative treatment. AQUATIC THERAPY COVERED:   Yes  MODALITIES COVERED:  Yes, with the exception of iontophoresis and hot packs/cold packs  TELEHEALTH COVERED: Yes   Visit # 8, 8/10 for progress note   Visits Allowed: Unlimited per medical necessity   Recertification Date:    Physician Follow-Up: Per patient   Physician Orders:    History of Present Illness: Srikanth Becerra is a retired  and [employer]. presents with c/o right hip pain 5/10 that began in 2022, noticing he was struggling to stand up from chairs. Denies falls in the past year. Presents today using a straight cane. States his wife is a retired nurse.   States he has been told he has bone-on-bone in

## 2023-05-09 ENCOUNTER — HOSPITAL ENCOUNTER (OUTPATIENT)
Dept: PHYSICAL THERAPY | Age: 83
Setting detail: THERAPIES SERIES
Discharge: HOME | End: 2023-05-09
Payer: MEDICARE

## 2023-05-09 PROCEDURE — 97110 THERAPEUTIC EXERCISES: CPT

## 2023-05-09 PROCEDURE — 97530 THERAPEUTIC ACTIVITIES: CPT

## 2023-05-09 NOTE — DISCHARGE SUMMARY
[address] [facility]  PHYSICAL THERAPY  [] EVALUATION  [] DAILY NOTE (LAND) [] DAILY NOTE (AQUATIC ) [] PROGRESS NOTE [x] DISCHARGE NOTE    [x] OUTPATIENT REHABILITATION [facility]   [] [place] [address]    [] [facility]   [] Malcom Benavides    Date: 2023  Patient Name:  Lindsey Vogel  : 1940  MRN: [id number]  CSN: [id number]    Referring Practitioner Marcos Mccauley MD   Diagnosis Unilateral primary osteoarthritis, right hip [M16.11]    Treatment Diagnosis OA right hip. Lumbago. Date of Evaluation 3/29/23    Additional Pertinent History CAD.  HTN. Dizziness. Tinnitus. Cardiac catheterization.  TURP. Right reverse shoulder replacement. Left RCR, SAD, DCE, biceps tenodesis, debridement. (States he may have metal hardware in right shoulder, but metal detectors are not activated.)  Metal stents in heart. Functional Outcome Measure Used Modified KARIN   Functional Outcome Score 6=12 (23)       Insurance: Primary: Payor: Nataly Chaves /  /  / ,   Secondary: [facility]   Authorization Information: PRECERTIFICATION REQUIRED:  No  INSURANCE THERAPY BENEFIT:  Patient has unlimited visits based on medical necessity  Benefit will not cover maintenance or preventative treatment. AQUATIC THERAPY COVERED:   Yes  MODALITIES COVERED:  Yes, with the exception of iontophoresis and hot packs/cold packs  TELEHEALTH COVERED: Yes   Visit # 9, 0/10 for progress note   Visits Allowed: Unlimited per medical necessity   Recertification Date:    Physician Follow-Up: Per patient   Physician Orders:    History of Present Illness: Dearl Mana is a retired  and [employer]. Presents with c/o right hip pain 5/10 that began in 2022, noticing he was struggling to stand up from chairs. Denies falls in the past year. Presents today using a straight cane. States his wife is a retired nurse.   States he has been told he has bone-on-bone in his

## 2023-05-11 ENCOUNTER — TELEPHONE (OUTPATIENT)
Dept: FAMILY MEDICINE CLINIC | Age: 83
End: 2023-05-11

## 2023-05-11 NOTE — TELEPHONE ENCOUNTER
----- Message from Jessica Denney MD sent at 5/10/2023  9:18 AM EDT -----  Hi Mr. Zhanna Perez, your triglycerides were elevated. You are currently on highest dose of Lipitor. May need to change meds. This will be addressed by Dr. Tanner Restrepo at your upcoming appt.

## 2023-05-15 ENCOUNTER — TELEPHONE (OUTPATIENT)
Dept: FAMILY MEDICINE CLINIC | Age: 83
End: 2023-05-15

## 2023-05-15 ENCOUNTER — OFFICE VISIT (OUTPATIENT)
Dept: FAMILY MEDICINE CLINIC | Age: 83
End: 2023-05-15
Payer: MEDICARE

## 2023-05-15 ENCOUNTER — NURSE ONLY (OUTPATIENT)
Dept: LAB | Age: 83
End: 2023-05-15

## 2023-05-15 VITALS
WEIGHT: 224.2 LBS | HEIGHT: 73 IN | RESPIRATION RATE: 12 BRPM | OXYGEN SATURATION: 98 % | SYSTOLIC BLOOD PRESSURE: 136 MMHG | BODY MASS INDEX: 29.72 KG/M2 | DIASTOLIC BLOOD PRESSURE: 86 MMHG | HEART RATE: 70 BPM | TEMPERATURE: 97.8 F

## 2023-05-15 DIAGNOSIS — E78.5 HYPERLIPIDEMIA, UNSPECIFIED HYPERLIPIDEMIA TYPE: ICD-10-CM

## 2023-05-15 DIAGNOSIS — R73.03 PREDIABETES: ICD-10-CM

## 2023-05-15 DIAGNOSIS — E78.1 HIGH BLOOD TRIGLYCERIDES: Primary | ICD-10-CM

## 2023-05-15 DIAGNOSIS — C61 PROSTATE CANCER (HCC): ICD-10-CM

## 2023-05-15 DIAGNOSIS — I10 ESSENTIAL HYPERTENSION: ICD-10-CM

## 2023-05-15 DIAGNOSIS — E78.1 HIGH BLOOD TRIGLYCERIDES: ICD-10-CM

## 2023-05-15 DIAGNOSIS — N13.8 BPH WITH OBSTRUCTION/LOWER URINARY TRACT SYMPTOMS: ICD-10-CM

## 2023-05-15 DIAGNOSIS — N40.1 BPH WITH OBSTRUCTION/LOWER URINARY TRACT SYMPTOMS: ICD-10-CM

## 2023-05-15 LAB
CHOLESTEROL, FASTING: 131 MG/DL (ref 100–199)
HDLC SERPL-MCNC: 33 MG/DL
LDLC SERPL CALC-MCNC: 67 MG/DL
TRIGLYCERIDE, FASTING: 156 MG/DL (ref 0–199)

## 2023-05-15 PROCEDURE — 1036F TOBACCO NON-USER: CPT

## 2023-05-15 PROCEDURE — 1123F ACP DISCUSS/DSCN MKR DOCD: CPT

## 2023-05-15 PROCEDURE — G8417 CALC BMI ABV UP PARAM F/U: HCPCS

## 2023-05-15 PROCEDURE — 3075F SYST BP GE 130 - 139MM HG: CPT

## 2023-05-15 PROCEDURE — 99213 OFFICE O/P EST LOW 20 MIN: CPT

## 2023-05-15 PROCEDURE — G8427 DOCREV CUR MEDS BY ELIG CLIN: HCPCS

## 2023-05-15 PROCEDURE — 3079F DIAST BP 80-89 MM HG: CPT

## 2023-05-15 SDOH — ECONOMIC STABILITY: INCOME INSECURITY: HOW HARD IS IT FOR YOU TO PAY FOR THE VERY BASICS LIKE FOOD, HOUSING, MEDICAL CARE, AND HEATING?: NOT HARD AT ALL

## 2023-05-15 SDOH — ECONOMIC STABILITY: FOOD INSECURITY: WITHIN THE PAST 12 MONTHS, YOU WORRIED THAT YOUR FOOD WOULD RUN OUT BEFORE YOU GOT MONEY TO BUY MORE.: NEVER TRUE

## 2023-05-15 SDOH — ECONOMIC STABILITY: HOUSING INSECURITY
IN THE LAST 12 MONTHS, WAS THERE A TIME WHEN YOU DID NOT HAVE A STEADY PLACE TO SLEEP OR SLEPT IN A SHELTER (INCLUDING NOW)?: NO

## 2023-05-15 SDOH — ECONOMIC STABILITY: FOOD INSECURITY: WITHIN THE PAST 12 MONTHS, THE FOOD YOU BOUGHT JUST DIDN'T LAST AND YOU DIDN'T HAVE MONEY TO GET MORE.: NEVER TRUE

## 2023-05-15 ASSESSMENT — PATIENT HEALTH QUESTIONNAIRE - PHQ9
5. POOR APPETITE OR OVEREATING: 0
8. MOVING OR SPEAKING SO SLOWLY THAT OTHER PEOPLE COULD HAVE NOTICED. OR THE OPPOSITE, BEING SO FIGETY OR RESTLESS THAT YOU HAVE BEEN MOVING AROUND A LOT MORE THAN USUAL: 0
1. LITTLE INTEREST OR PLEASURE IN DOING THINGS: 0
SUM OF ALL RESPONSES TO PHQ9 QUESTIONS 1 & 2: 0
SUM OF ALL RESPONSES TO PHQ QUESTIONS 1-9: 0
SUM OF ALL RESPONSES TO PHQ QUESTIONS 1-9: 0
6. FEELING BAD ABOUT YOURSELF - OR THAT YOU ARE A FAILURE OR HAVE LET YOURSELF OR YOUR FAMILY DOWN: 0
2. FEELING DOWN, DEPRESSED OR HOPELESS: 0
9. THOUGHTS THAT YOU WOULD BE BETTER OFF DEAD, OR OF HURTING YOURSELF: 0
7. TROUBLE CONCENTRATING ON THINGS, SUCH AS READING THE NEWSPAPER OR WATCHING TELEVISION: 0
SUM OF ALL RESPONSES TO PHQ QUESTIONS 1-9: 0
4. FEELING TIRED OR HAVING LITTLE ENERGY: 0
SUM OF ALL RESPONSES TO PHQ QUESTIONS 1-9: 0
3. TROUBLE FALLING OR STAYING ASLEEP: 0
10. IF YOU CHECKED OFF ANY PROBLEMS, HOW DIFFICULT HAVE THESE PROBLEMS MADE IT FOR YOU TO DO YOUR WORK, TAKE CARE OF THINGS AT HOME, OR GET ALONG WITH OTHER PEOPLE: 0

## 2023-05-15 ASSESSMENT — ENCOUNTER SYMPTOMS
VOMITING: 0
NAUSEA: 0
ABDOMINAL PAIN: 0
DIARRHEA: 0
STRIDOR: 0
SHORTNESS OF BREATH: 0
CONSTIPATION: 0
WHEEZING: 0
COUGH: 0

## 2023-05-15 NOTE — PROGRESS NOTES
S: [de-identified] y.o. male with   Chief Complaint   Patient presents with    6 Month Follow-Up     Chronic Conditions   Doing well in general.   High cholesterol -- on lipitor 80 mg  Noted elevated trigs on 5/5/23 blood work. Nonfasting. HTN doing okay. Has CAD. Tolerating regimen including plavix + ASA. Has prediabetes. Will be getting hip surgery soon, following with ortho. -- mobic on board currently. Lab Results   Component Value Date    CHOL 164 08/20/2021    CHOL 169 08/11/2020    CHOL 176 08/06/2019     Lab Results   Component Value Date    TRIG 324 (H) 05/05/2023    TRIG 166 01/12/2022    TRIG 290 (H) 08/20/2021     Lab Results   Component Value Date    HDL 26 08/24/2022    HDL 32 01/12/2022    HDL 28 08/20/2021     Lab Results   Component Value Date    LDLCALC 78 08/24/2022    LDLCALC 78 08/20/2021    LDLCALC 97 08/11/2020     Lab Results   Component Value Date    LABVLDL 56 (H) 08/06/2019    LABVLDL 43 (H) 06/04/2018    LABVLDL 59 (H) 08/27/2015     Lab Results   Component Value Date    CHOLHDLRATIO 5.9 (H) 08/06/2019    CHOLHDLRATIO 5.1 (H) 06/04/2018    CHOLHDLRATIO 6.1 (H) 08/27/2015         BP Readings from Last 3 Encounters:   05/15/23 136/86   11/08/22 (!) 150/88   09/20/22 (!) 158/82     Wt Readings from Last 3 Encounters:   05/15/23 224 lb 3.2 oz (101.7 kg)   11/08/22 231 lb 6.4 oz (105 kg)   09/20/22 235 lb (106.6 kg)     Lab Results   Component Value Date    LABA1C 5.7 08/24/2022    LABA1C 5.7 08/20/2021    LABA1C 5.6 08/11/2020       O: VS:   Vitals:    05/15/23 0823   BP: 136/86   Site: Right Upper Arm   Position: Sitting   Cuff Size: Medium Adult   Pulse: 70   Resp: 12   Temp: 97.8 °F (36.6 °C)   TempSrc: Temporal   SpO2: 98%   Weight: 224 lb 3.2 oz (101.7 kg)   Height: 6' 1\" (1.854 m)     Body mass index is 29.58 kg/m².     AAO/NAD, appropriate affect for mood  Normocephalic, atraumatic, eyes - conjunctiva and sclera normal,   skin no rashes on exposed areas   Insight, judgement normal and in
lower leg: No edema. Left lower leg: No edema. Comments: Hip pain 2/2 OA noted. Skin:     General: Skin is warm and dry. Capillary Refill: Capillary refill takes less than 2 seconds. Coloration: Skin is not jaundiced or pale. Findings: No erythema, lesion or rash. Neurological:      General: No focal deficit present. Mental Status: He is alert and oriented to person, place, and time. Mental status is at baseline. Cranial Nerves: No cranial nerve deficit. Motor: No weakness. Gait: Gait normal.          On this date 5/15/2023 I have spent 30 minutes reviewing previous notes, test results and face to face with the patient discussing the diagnosis and importance of compliance with the treatment plan as well as documenting on the day of the visit. An electronic signature was used to authenticate this note.     --Raysa Salmeron MD

## 2023-05-15 NOTE — TELEPHONE ENCOUNTER
----- Message from Pawel Johnson MD sent at 5/15/2023 12:59 PM EDT -----  Fasting Lipid labs demonstrate improved triglyceride leves at 156 from previous 300's. No need to start triglyceride meds at this time. Follow up in 6 months (August/2023) with routine blood draw,  Advise fasting at that time for 8-10 hours, can have water, no sugar with coffee/tea.

## 2023-05-15 NOTE — RESULT ENCOUNTER NOTE
Fasting Lipid labs demonstrate improved triglyceride leves at 156 from previous 300's. No need to start triglyceride meds at this time. Follow up in 6 months (August/2023) with routine blood draw,  Advise fasting at that time for 8-10 hours, can have water, no sugar with coffee/tea.

## 2023-06-15 NOTE — TELEPHONE ENCOUNTER
[address]    PATIENT'S NAME: Tamir Morris   ATTENDING PHYSICIAN: Kwasi Hagen M.D. OPERATING PHYSICIAN: Kwasi Hagen M.D. PATIENT ACCOUNT#:   [de-identified]    LOCATION:  [facility] [address] [id number] [facility] #:   [id number]       YOB: 1956  ADMISSION DATE:       06/15/2023      OPERATION DATE:  06/15/2023    OPERATIVE REPORT      PREOPERATIVE DIAGNOSIS:  Right quadriceps tendon tear/extensor mechanism disruption following total knee arthroplasty. POSTOPERATIVE DIAGNOSIS:  Right quadriceps tendon tear/extensor mechanism disruption following total knee arthroplasty. PROCEDURE:  Right quadriceps tendon repair. ASSISTANT:  Jody Chang PA-C. Skilled assistance was needed for patient positioning, prepping, draping, instrument holding and passing, retraction, and closure. ANESTHESIA:  General with regional block. INDICATIONS:  This is a 62-year-old female who presents following total knee arthroplasty with a misstep causing defect to the repair of her extensor tendon/quadriceps region. I reviewed the indications, benefits of the procedure. The patient understood and agreed to proceed. OPERATIVE TECHNIQUE:  The patient was taken to the operating room and placed in the supine position where general anesthesia was administered. A regional block was performed of the right lower extremity. A time-out was performed, confirming the right knee as the appropriate operative site. The patient received IV antibiotics preoperatively. The tourniquet was placed to the right thigh, which was later inflated to 300 mmHg. The right lower extremity was prepped and draped in the usual sterile fashion. Knife used to make a longitudinal incision in line with the previous incision site. Sharp dissection was carried down to the quadriceps tendon, where the defect was easily palpable, with some intermittent intervening scar tissue.   The area was debrided back to the healthy tendon at Letter to schedule Medicare AWV both the quadriceps and patellar sides. After adequate debridement was complete. The #5 FiberWire was used in a figure-of-eight fashion to reapproximate the tendon at the midportion with stable repair. Multiple sutures were used. When this was complete, the knee was flexed to confirm stable repair. The wound was copiously irrigated with normal saline before closure. No Vicryl sutures used to reapproximate the deep subcutaneous tissue. The superficial subcutaneous tissue was reapproximated with inverted 2-0 Vicryl suture, and the skin was closed with staples. A sterile dressing was placed followed by a T ROM brace locked in extension. The patient tolerated the procedure well. There were no complications. Blood loss was minimal.  Tourniquet time was approximately 45 minutes. The patient was taken to recovery in stable condition to be discharged home when fully recovered.     Dictated By Kanika Degroot M.D.  d: 06/15/2023 11:12:12  t: 06/15/2023 14:06:13  [facility] [id number]  [place]/

## 2023-06-23 ENCOUNTER — TELEPHONE (OUTPATIENT)
Dept: CARDIOLOGY CLINIC | Age: 83
End: 2023-06-23

## 2023-06-24 ENCOUNTER — NURSE ONLY (OUTPATIENT)
Dept: LAB | Age: 83
End: 2023-06-24

## 2023-06-24 DIAGNOSIS — C61 PROSTATE CANCER (HCC): ICD-10-CM

## 2023-06-24 LAB — PSA SERPL-MCNC: 0.93 NG/ML (ref 0–1)

## 2023-06-26 ENCOUNTER — OFFICE VISIT (OUTPATIENT)
Dept: UROLOGY | Age: 83
End: 2023-06-26
Payer: MEDICARE

## 2023-06-26 VITALS
WEIGHT: 224 LBS | BODY MASS INDEX: 29.69 KG/M2 | SYSTOLIC BLOOD PRESSURE: 130 MMHG | HEIGHT: 73 IN | DIASTOLIC BLOOD PRESSURE: 78 MMHG

## 2023-06-26 DIAGNOSIS — N28.1 RENAL CYST, ACQUIRED: ICD-10-CM

## 2023-06-26 DIAGNOSIS — C61 PROSTATE CANCER (HCC): Primary | ICD-10-CM

## 2023-06-26 LAB
BILIRUBIN URINE: NEGATIVE
BLOOD URINE, POC: NEGATIVE
CHARACTER, URINE: CLEAR
COLOR, URINE: YELLOW
GLUCOSE URINE: NEGATIVE MG/DL
KETONES, URINE: NEGATIVE
LEUKOCYTE CLUMPS, URINE: NEGATIVE
NITRITE, URINE: NEGATIVE
PH, URINE: 5.5 (ref 5–9)
PROTEIN, URINE: NEGATIVE MG/DL
SPECIFIC GRAVITY, URINE: <= 1.005 (ref 1–1.03)
UROBILINOGEN, URINE: 1 EU/DL (ref 0–1)

## 2023-06-26 PROCEDURE — G8427 DOCREV CUR MEDS BY ELIG CLIN: HCPCS | Performed by: PHYSICIAN ASSISTANT

## 2023-06-26 PROCEDURE — 99213 OFFICE O/P EST LOW 20 MIN: CPT | Performed by: PHYSICIAN ASSISTANT

## 2023-06-26 PROCEDURE — 81003 URINALYSIS AUTO W/O SCOPE: CPT | Performed by: PHYSICIAN ASSISTANT

## 2023-06-26 PROCEDURE — G8417 CALC BMI ABV UP PARAM F/U: HCPCS | Performed by: PHYSICIAN ASSISTANT

## 2023-06-26 PROCEDURE — 1123F ACP DISCUSS/DSCN MKR DOCD: CPT | Performed by: PHYSICIAN ASSISTANT

## 2023-06-26 PROCEDURE — 3075F SYST BP GE 130 - 139MM HG: CPT | Performed by: PHYSICIAN ASSISTANT

## 2023-06-26 PROCEDURE — 1036F TOBACCO NON-USER: CPT | Performed by: PHYSICIAN ASSISTANT

## 2023-06-26 PROCEDURE — 3078F DIAST BP <80 MM HG: CPT | Performed by: PHYSICIAN ASSISTANT

## 2023-07-21 ENCOUNTER — TELEPHONE (OUTPATIENT)
Dept: CARDIOLOGY CLINIC | Age: 83
End: 2023-07-21

## 2023-07-21 NOTE — TELEPHONE ENCOUNTER
Dr. Crystal Bello how many days should be hold Plavix and ASA prior to hip surgery? Previously cleared in June.

## 2023-07-21 NOTE — TELEPHONE ENCOUNTER
----- Message from Jess Londono sent at 7/21/2023 11:27 AM EDT -----  Regarding: Plavix  Contact: [phone]  A pre-op risk assessment paper was submitted to Dr. Lydia Hutchinson for Avelino Sims, re: hip surgery on August 8 however  it was not addressed as to whether he can be off his plavix and ASA prior to OR. Can he be off these meds and for how long prior and post surgey.   Thanks

## 2023-07-24 ENCOUNTER — OFFICE VISIT (OUTPATIENT)
Dept: FAMILY MEDICINE CLINIC | Age: 83
End: 2023-07-24
Payer: MEDICARE

## 2023-07-24 ENCOUNTER — TELEPHONE (OUTPATIENT)
Dept: FAMILY MEDICINE CLINIC | Age: 83
End: 2023-07-24

## 2023-07-24 VITALS
DIASTOLIC BLOOD PRESSURE: 72 MMHG | RESPIRATION RATE: 20 BRPM | TEMPERATURE: 98.4 F | OXYGEN SATURATION: 97 % | WEIGHT: 226.8 LBS | HEIGHT: 73 IN | SYSTOLIC BLOOD PRESSURE: 130 MMHG | BODY MASS INDEX: 30.06 KG/M2 | HEART RATE: 70 BPM

## 2023-07-24 DIAGNOSIS — I10 ESSENTIAL HYPERTENSION: ICD-10-CM

## 2023-07-24 DIAGNOSIS — Z01.818 PRE-OPERATIVE GENERAL PHYSICAL EXAMINATION: Primary | ICD-10-CM

## 2023-07-24 DIAGNOSIS — I25.10 CORONARY ARTERY DISEASE INVOLVING NATIVE HEART WITHOUT ANGINA PECTORIS, UNSPECIFIED VESSEL OR LESION TYPE: ICD-10-CM

## 2023-07-24 DIAGNOSIS — R73.01 IMPAIRED FASTING GLUCOSE: ICD-10-CM

## 2023-07-24 DIAGNOSIS — R73.03 PREDIABETES: ICD-10-CM

## 2023-07-24 PROCEDURE — 1036F TOBACCO NON-USER: CPT | Performed by: STUDENT IN AN ORGANIZED HEALTH CARE EDUCATION/TRAINING PROGRAM

## 2023-07-24 PROCEDURE — G8417 CALC BMI ABV UP PARAM F/U: HCPCS | Performed by: STUDENT IN AN ORGANIZED HEALTH CARE EDUCATION/TRAINING PROGRAM

## 2023-07-24 PROCEDURE — G8427 DOCREV CUR MEDS BY ELIG CLIN: HCPCS | Performed by: STUDENT IN AN ORGANIZED HEALTH CARE EDUCATION/TRAINING PROGRAM

## 2023-07-24 PROCEDURE — 3074F SYST BP LT 130 MM HG: CPT | Performed by: STUDENT IN AN ORGANIZED HEALTH CARE EDUCATION/TRAINING PROGRAM

## 2023-07-24 PROCEDURE — 1123F ACP DISCUSS/DSCN MKR DOCD: CPT | Performed by: STUDENT IN AN ORGANIZED HEALTH CARE EDUCATION/TRAINING PROGRAM

## 2023-07-24 PROCEDURE — 3078F DIAST BP <80 MM HG: CPT | Performed by: STUDENT IN AN ORGANIZED HEALTH CARE EDUCATION/TRAINING PROGRAM

## 2023-07-24 PROCEDURE — 99214 OFFICE O/P EST MOD 30 MIN: CPT | Performed by: STUDENT IN AN ORGANIZED HEALTH CARE EDUCATION/TRAINING PROGRAM

## 2023-07-24 RX ORDER — METHYLPREDNISOLONE 4 MG
TABLET, DOSE PACK ORAL
COMMUNITY

## 2023-07-24 RX ORDER — CYANOCOBALAMIN (VITAMIN B-12) 500 MCG
1 TABLET ORAL
COMMUNITY

## 2023-07-24 NOTE — TELEPHONE ENCOUNTER
Could you ask [facility] to fax over patient's recent EKG, labs, and any cxr to our office for pre op clearance? The surgery is a right hip replacement w/ Dr. Wendi Kapadia. Thank you!

## 2023-07-28 NOTE — TELEPHONE ENCOUNTER
Labs received thank you. Patient has not had an EKG for 2 years. Will order one for patient. Informed pt over the phone and he will get it when he returns town next Tuesday.

## 2023-08-02 ENCOUNTER — HOSPITAL ENCOUNTER (OUTPATIENT)
Age: 83
Discharge: HOME | End: 2023-08-02
Payer: MEDICARE

## 2023-08-02 DIAGNOSIS — Z01.818 PRE-OPERATIVE GENERAL PHYSICAL EXAMINATION: ICD-10-CM

## 2023-08-02 PROCEDURE — 93005 ELECTROCARDIOGRAM TRACING: CPT

## 2023-08-03 PROCEDURE — 93010 ELECTROCARDIOGRAM REPORT: CPT | Performed by: INTERNAL MEDICINE

## 2023-08-04 LAB
EKG ATRIAL RATE: 71 BPM
EKG P AXIS: -21 DEGREES
EKG P-R INTERVAL: 182 MS
EKG Q-T INTERVAL: 398 MS
EKG QRS DURATION: 98 MS
EKG QTC CALCULATION (BAZETT): 432 MS
EKG R AXIS: -31 DEGREES
EKG T AXIS: -26 DEGREES
EKG VENTRICULAR RATE: 71 BPM

## 2023-08-07 ENCOUNTER — NURSE ONLY (OUTPATIENT)
Dept: LAB | Age: 83
End: 2023-08-07

## 2023-08-07 DIAGNOSIS — R73.01 IMPAIRED FASTING GLUCOSE: ICD-10-CM

## 2023-08-07 DIAGNOSIS — Z01.818 PRE-OPERATIVE GENERAL PHYSICAL EXAMINATION: ICD-10-CM

## 2023-08-07 DIAGNOSIS — R73.03 PREDIABETES: ICD-10-CM

## 2023-08-07 LAB
ABO: NORMAL
ALBUMIN SERPL BCG-MCNC: 4 G/DL (ref 3.5–5.1)
ALP SERPL-CCNC: 119 U/L (ref 38–126)
ALT SERPL W/O P-5'-P-CCNC: 26 U/L (ref 11–66)
ANION GAP SERPL CALC-SCNC: 9 MEQ/L (ref 8–16)
ANTIBODY SCREEN: NORMAL
AST SERPL-CCNC: 21 U/L (ref 5–40)
BASOPHILS ABSOLUTE: 0.1 THOU/MM3 (ref 0–0.1)
BASOPHILS NFR BLD AUTO: 1.4 %
BILIRUB SERPL-MCNC: 0.8 MG/DL (ref 0.3–1.2)
BUN SERPL-MCNC: 20 MG/DL (ref 7–22)
CALCIUM SERPL-MCNC: 10.1 MG/DL (ref 8.5–10.5)
CHLORIDE SERPL-SCNC: 106 MEQ/L (ref 98–111)
CO2 SERPL-SCNC: 27 MEQ/L (ref 23–33)
CREAT SERPL-MCNC: 0.7 MG/DL (ref 0.4–1.2)
DEPRECATED MEAN GLUCOSE BLD GHB EST-ACNC: 111 MG/DL (ref 70–126)
DEPRECATED RDW RBC AUTO: 45.9 FL (ref 35–45)
EOSINOPHIL NFR BLD AUTO: 1.8 %
EOSINOPHILS ABSOLUTE: 0.1 THOU/MM3 (ref 0–0.4)
ERYTHROCYTE [DISTWIDTH] IN BLOOD BY AUTOMATED COUNT: 12.8 % (ref 11.5–14.5)
FOLATE SERPL-MCNC: 18.8 NG/ML (ref 4.8–24.2)
GFR SERPL CREATININE-BSD FRML MDRD: > 60 ML/MIN/1.73M2
GLUCOSE SERPL-MCNC: 102 MG/DL (ref 70–108)
HBA1C MFR BLD HPLC: 5.7 % (ref 4.4–6.4)
HCT VFR BLD AUTO: 46.7 % (ref 42–52)
HGB BLD-MCNC: 15.3 GM/DL (ref 14–18)
IMM GRANULOCYTES # BLD AUTO: 0.01 THOU/MM3 (ref 0–0.07)
IMM GRANULOCYTES NFR BLD AUTO: 0.2 %
LYMPHOCYTES ABSOLUTE: 1.5 THOU/MM3 (ref 1–4.8)
LYMPHOCYTES NFR BLD AUTO: 30.1 %
MCH RBC QN AUTO: 31.9 PG (ref 26–33)
MCHC RBC AUTO-ENTMCNC: 32.8 GM/DL (ref 32.2–35.5)
MCV RBC AUTO: 97.3 FL (ref 80–94)
MONOCYTES ABSOLUTE: 0.6 THOU/MM3 (ref 0.4–1.3)
MONOCYTES NFR BLD AUTO: 13.1 %
NEUTROPHILS NFR BLD AUTO: 53.4 %
NRBC BLD AUTO-RTO: 0 /100 WBC
PLATELET # BLD AUTO: 193 THOU/MM3 (ref 130–400)
PMV BLD AUTO: 10.7 FL (ref 9.4–12.4)
POTASSIUM SERPL-SCNC: 4 MEQ/L (ref 3.5–5.2)
PROT SERPL-MCNC: 7.1 G/DL (ref 6.1–8)
RBC # BLD AUTO: 4.8 MILL/MM3 (ref 4.7–6.1)
RH FACTOR: NORMAL
SEGMENTED NEUTROPHILS ABSOLUTE COUNT: 2.6 THOU/MM3 (ref 1.8–7.7)
SODIUM SERPL-SCNC: 142 MEQ/L (ref 135–145)
TSH SERPL DL<=0.005 MIU/L-ACNC: 1.66 UIU/ML (ref 0.4–4.2)
VIT B12 SERPL-MCNC: 774 PG/ML (ref 211–911)
WBC # BLD AUTO: 4.9 THOU/MM3 (ref 4.8–10.8)

## 2023-08-10 ENCOUNTER — TELEPHONE (OUTPATIENT)
Dept: FAMILY MEDICINE CLINIC | Age: 83
End: 2023-08-10

## 2023-08-10 NOTE — TELEPHONE ENCOUNTER
----- Message from Alexandria Fritz MD sent at 8/8/2023  3:32 PM EDT -----  Labs reviewd. Labs stable. No major concern at this time.     Electronically signed by Alexandria Fritz MD on 8/8/2023 at 3:32 PM

## 2023-08-15 ENCOUNTER — HOSPITAL ENCOUNTER (OUTPATIENT)
Dept: PHYSICAL THERAPY | Age: 83
Setting detail: THERAPIES SERIES
Discharge: HOME | End: 2023-08-15
Payer: MEDICARE

## 2023-08-15 PROCEDURE — 97161 PT EVAL LOW COMPLEX 20 MIN: CPT

## 2023-08-15 PROCEDURE — 97110 THERAPEUTIC EXERCISES: CPT

## 2023-08-15 NOTE — PROGRESS NOTES
** PLEASE SIGN, DATE AND TIME CERTIFICATION BELOW AND RETURN TO [facility] OUTPATIENT REHABILITATION (FAX #: [phone]). ATTEST/CO-SIGN IF ACCESSING VIA [employer]. THANK YOU.**    I certify that I have examined the patient below and determined that Physical Medicine and Rehabilitation service is necessary and that I approve the established plan of care for up to 90 days or as specifically noted. Attestation, signature or co-signature of physician indicates approval of certification requirements.    ________________________ ____________ __________  Physician Signature   Date   Time  [address] [facility]  PHYSICAL THERAPY  [x] EVALUATION  [] DAILY NOTE (LAND) [] DAILY NOTE (AQUATIC ) [] PROGRESS NOTE [] DISCHARGE NOTE    [x] [address] [facility] [address] [facility] [place]   [] [address] [facility]    [] [facility]   [] Algis Grams    Date: 8/15/2023  Patient Name:  Vinny Man  : 1940  MRN: [id number]  CSN: [id number]    Referring Practitioner Maria Elena Hall MD   Diagnosis Unilateral primary osteoarthritis, right hip [M16.11]    Treatment Diagnosis Aftercare following Right hip replacement, Difficulty in walking, right hip pain, decreased right hip ROM, decreased RLE Strength    Date of Evaluation 8/15/23    Additional Pertinent History CAD, HTN, Prostate Cancer, Arrhythmia, Depression, Tinnitus, CTR, Cataract removal, Cardiac Catherization, Bilateral inguinal hernia repair (s), Prostate Sx., RTC Repair, R ADONAY DAA 23      Functional Outcome Measure Used Facundo Gillespie. Functional Outcome Score  (8/15/23)       Insurance: Primary: Payor: Mally Mercado /  /  / ,   Secondary: [facility]   Authorization Information: OUTPATIENT BENEFITS:              DEDUCTIBLE:  $203              OUT OF POCKET:  N/A              INSURANCE PAYS AT:   80%              PATIENT RESPONSIBILITY AND/OR CO-PAY:  20%  SECONDARY INSURANCE COMPANY: [address] Medicare supplement.

## 2023-08-17 ENCOUNTER — HOSPITAL ENCOUNTER (OUTPATIENT)
Dept: PHYSICAL THERAPY | Age: 83
Setting detail: THERAPIES SERIES
Discharge: HOME | End: 2023-08-17
Payer: MEDICARE

## 2023-08-17 PROCEDURE — 97110 THERAPEUTIC EXERCISES: CPT

## 2023-08-17 NOTE — PROGRESS NOTES
[address] [facility]  PHYSICAL THERAPY  [] EVALUATION  [x] DAILY NOTE (LAND) [] DAILY NOTE (AQUATIC ) [] PROGRESS NOTE [] DISCHARGE NOTE    [x] OUTPATIENT REHABILITATION CENTER [facility]   [] [address] [place] [address]    [] [facility]   [] [facility]    Date: 2023  Patient Name:  Maty Cisneros  : 1940  MRN: [id number]  CSN: [id number]    Referring Practitioner Zack Hardy MD   Diagnosis Unilateral primary osteoarthritis, right hip [M16.11]    Treatment Diagnosis Aftercare following Right hip replacement, Difficulty in walking, right hip pain, decreased right hip ROM, decreased RLE Strength    Date of Evaluation 8/15/23    Additional Pertinent History CAD, HTN, Prostate Cancer, Arrhythmia, Depression, Tinnitus, CTR, Cataract removal, Cardiac Catherization, Bilateral inguinal hernia repair (), Prostate Sx., RTC Repair, R ADONAY DAA 23      Functional Outcome Measure Used Teresa Aid. Functional Outcome Score  (8/15/23)       Insurance: Primary: Payor: Maryann Gayle /  /  / ,   Secondary: [facility]   Authorization Information: OUTPATIENT BENEFITS:              DEDUCTIBLE:  $203              OUT OF POCKET:  N/A              INSURANCE PAYS AT:   80%              PATIENT RESPONSIBILITY AND/OR CO-PAY:  20%  SECONDARY INSURANCE COMPANY: [address] Medicare supplement. PRECERTIFICATION REQUIRED:  No  INSURANCE THERAPY BENEFIT:  Patient has unlimited visits based on medical necessity  Benefit will not cover maintenance or preventative treatment. AQUATIC THERAPY COVERED:   Yes  MODALITIES COVERED:  Yes, with the exception of iontophoresis and hot packs/cold packs   Visit # 2, 2/10 for progress note   Visits Allowed: BMN   Recertification Date:    Physician Follow-Up: 23   Physician Orders: Terence Coe and treat, DAA, No Precautions    History of Present Illness: Pt underwent Right DAA on 23, was d/c home on 23.  Pt notes that he has been

## 2023-08-21 ENCOUNTER — HOSPITAL ENCOUNTER (OUTPATIENT)
Dept: PHYSICAL THERAPY | Age: 83
Setting detail: THERAPIES SERIES
Discharge: HOME | End: 2023-08-21
Payer: MEDICARE

## 2023-08-21 PROCEDURE — 97110 THERAPEUTIC EXERCISES: CPT

## 2023-08-21 NOTE — PROGRESS NOTES
[address] [facility]  PHYSICAL THERAPY  [] EVALUATION  [x] DAILY NOTE (LAND) [] DAILY NOTE (AQUATIC ) [] PROGRESS NOTE [] DISCHARGE NOTE    [x] OUTPATIENT REHABILITATION CENTER [facility]   [] [address] [place] [address]    [] [facility]   [] Wellington Brown    Date: 2023  Patient Name:  Stanton Marti  : 1940  MRN: [id number]  CSN: [id number]    Referring Practitioner Jeri Worrell MD   Diagnosis Unilateral primary osteoarthritis, right hip [M16.11]    Treatment Diagnosis Aftercare following Right hip replacement, Difficulty in walking, right hip pain, decreased right hip ROM, decreased RLE Strength    Date of Evaluation 8/15/23    Additional Pertinent History CAD, HTN, Prostate Cancer, Arrhythmia, Depression, Tinnitus, CTR, Cataract removal, Cardiac Catherization, Bilateral inguinal hernia repair (), Prostate Sx., RTC Repair, R ADONAY DAA 23      Functional Outcome Measure Used Surendra Ascencio. Functional Outcome Score  (8/15/23)       Insurance: Primary: Payor: [employer] /  /  / ,   Secondary: [facility]   Authorization Information: OUTPATIENT BENEFITS:              DEDUCTIBLE:  $203              OUT OF POCKET:  N/A              INSURANCE PAYS AT:   80%              PATIENT RESPONSIBILITY AND/OR CO-PAY:  20%  SECONDARY INSURANCE COMPANY: [address] Medicare supplement. PRECERTIFICATION REQUIRED:  No  INSURANCE THERAPY BENEFIT:  Patient has unlimited visits based on medical necessity  Benefit will not cover maintenance or preventative treatment. AQUATIC THERAPY COVERED:   Yes  MODALITIES COVERED:  Yes, with the exception of iontophoresis and hot packs/cold packs   Visit # 3, 3/10 for progress note   Visits Allowed: BMN   Recertification Date:    Physician Follow-Up: 23   Physician Orders: Christianne Leo and treat, DAA, No Precautions    History of Present Illness: Pt underwent Right DAA on 23, was d/c home on 23.  Pt notes that he has been

## 2023-08-25 ENCOUNTER — APPOINTMENT (OUTPATIENT)
Dept: PHYSICAL THERAPY | Age: 83
End: 2023-08-25
Payer: MEDICARE

## 2023-08-29 ENCOUNTER — HOSPITAL ENCOUNTER (OUTPATIENT)
Dept: PHYSICAL THERAPY | Age: 83
Setting detail: THERAPIES SERIES
Discharge: HOME | End: 2023-08-29
Payer: MEDICARE

## 2023-08-29 PROCEDURE — 97110 THERAPEUTIC EXERCISES: CPT

## 2023-08-29 NOTE — PROGRESS NOTES
[address] [facility]  PHYSICAL THERAPY  [] EVALUATION  [x] DAILY NOTE (LAND) [] DAILY NOTE (AQUATIC ) [] PROGRESS NOTE [] DISCHARGE NOTE    [x] OUTPATIENT REHABILITATION CENTER [facility]   [] [address] [facility]    [] [facility]   [] Shante Pacheco    Date: 2023  Patient Name:  Nel Hercules  : 1940  MRN: [id number]  CSN: [id number]    Referring Practitioner Chiquis Copeland MD   Diagnosis Unilateral primary osteoarthritis, right hip [M16.11]    Treatment Diagnosis Aftercare following Right hip replacement, Difficulty in walking, right hip pain, decreased right hip ROM, decreased RLE Strength    Date of Evaluation 8/15/23    Additional Pertinent History CAD, HTN, Prostate Cancer, Arrhythmia, Depression, Tinnitus, CTR, Cataract removal, Cardiac Catherization, Bilateral inguinal hernia repair (), Prostate Sx., RTC Repair, R ADONAY DAA 23      Functional Outcome Measure Used Snehal Prescott. Functional Outcome Score  (8/15/23)       Insurance: Primary: Payor: [address] [place] [address] /  /  / ,   Secondary: [facility]   Authorization Information: OUTPATIENT BENEFITS:              DEDUCTIBLE:  $203              OUT OF POCKET:  N/A              INSURANCE PAYS AT:   80%              PATIENT RESPONSIBILITY AND/OR CO-PAY:  20%  SECONDARY INSURANCE COMPANY: [address] Medicare supplement. PRECERTIFICATION REQUIRED:  No  INSURANCE THERAPY BENEFIT:  Patient has unlimited visits based on medical necessity  Benefit will not cover maintenance or preventative treatment. AQUATIC THERAPY COVERED:   Yes  MODALITIES COVERED:  Yes, with the exception of iontophoresis and hot packs/cold packs   Visit # 4, /10 for progress note   Visits Allowed: BMN   Recertification Date:    Physician Follow-Up: 23   Physician Orders: Kenrick Lilly and treat, DAA, No Precautions    History of Present Illness: Pt underwent Right DAA on 23, was d/c home on 23.  Pt notes that he has been

## 2023-08-31 ENCOUNTER — APPOINTMENT (OUTPATIENT)
Dept: PHYSICAL THERAPY | Age: 83
End: 2023-08-31
Payer: MEDICARE

## 2023-09-05 ENCOUNTER — HOSPITAL ENCOUNTER (OUTPATIENT)
Dept: PHYSICAL THERAPY | Age: 83
Setting detail: THERAPIES SERIES
Discharge: HOME | End: 2023-09-05
Payer: MEDICARE

## 2023-09-05 PROCEDURE — 97110 THERAPEUTIC EXERCISES: CPT

## 2023-09-08 ENCOUNTER — APPOINTMENT (OUTPATIENT)
Dept: PHYSICAL THERAPY | Age: 83
End: 2023-09-08
Payer: MEDICARE

## 2023-09-11 ENCOUNTER — HOSPITAL ENCOUNTER (OUTPATIENT)
Dept: PHYSICAL THERAPY | Age: 83
Setting detail: THERAPIES SERIES
Discharge: HOME | End: 2023-09-11
Payer: MEDICARE

## 2023-09-11 PROCEDURE — 97110 THERAPEUTIC EXERCISES: CPT

## 2023-09-11 NOTE — PROGRESS NOTES
[address] [facility]  PHYSICAL THERAPY  [] EVALUATION  [x] DAILY NOTE (LAND) [] DAILY NOTE (AQUATIC ) [] PROGRESS NOTE [] DISCHARGE NOTE    [x] OUTPATIENT REHABILITATION CENTER [facility]   [] [address] [place] [address]    [] [facility]   [] Jay Alexis    Date: 2023  Patient Name:  Franky Watters  : 1940  MRN: [id number]  CSN: [id number]    Referring Practitioner Toña Galicia MD   Diagnosis Unilateral primary osteoarthritis, right hip [M16.11]    Treatment Diagnosis Aftercare following Right hip replacement, Difficulty in walking, right hip pain, decreased right hip ROM, decreased RLE Strength    Date of Evaluation 8/15/23    Additional Pertinent History CAD, HTN, Prostate Cancer, Arrhythmia, Depression, Tinnitus, CTR, Cataract removal, Cardiac Catherization, Bilateral inguinal hernia repair (), Prostate Sx., RTC Repair, R ADONAY DAA 23      Functional Outcome Measure Used Italia Maciel. Functional Outcome Score  (8/15/23)       Insurance: Primary: Payor: [employer] /  /  / ,   Secondary: [facility]   Authorization Information: OUTPATIENT BENEFITS:              DEDUCTIBLE:  $203              OUT OF POCKET:  N/A              INSURANCE PAYS AT:   80%              PATIENT RESPONSIBILITY AND/OR CO-PAY:  20%  SECONDARY INSURANCE COMPANY: [address] Medicare supplement. PRECERTIFICATION REQUIRED:  No  INSURANCE THERAPY BENEFIT:  Patient has unlimited visits based on medical necessity  Benefit will not cover maintenance or preventative treatment. AQUATIC THERAPY COVERED:   Yes  MODALITIES COVERED:  Yes, with the exception of iontophoresis and hot packs/cold packs   Visit # 6, 10 for progress note   Visits Allowed: BMN   Recertification Date:    Physician Follow-Up: 23   Physician Orders: Brown Core and treat, DAA, No Precautions    History of Present Illness: Pt underwent Right DAA on 23, was d/c home on 23.  Pt notes that he has been

## 2023-09-13 ENCOUNTER — HOSPITAL ENCOUNTER (OUTPATIENT)
Dept: PHYSICAL THERAPY | Age: 83
Setting detail: THERAPIES SERIES
Discharge: HOME | End: 2023-09-13
Payer: MEDICARE

## 2023-09-13 PROCEDURE — 97110 THERAPEUTIC EXERCISES: CPT

## 2023-09-13 NOTE — PROGRESS NOTES
0/10 for improved comfort and activity tolerance. GOAL MET:   .  Discontinue Goal  Pt to demo steady gait pattern for 100 feet w/o AD for ease with household ambulation. GOAL NOT MET:  .  Continue Goal      Long Term Goals: 8 weeks  Pt to improve HOOS score from 8/24 to <=2/24 to indicate a decrease in functional limitations. GOAL NOT MET:  .  Continue Goal  Pt to demo Hip AROM WNL for ease with ADLs . GOAL NOT MET:  .  Continue Goal  Pt to demo Right hip strength of 5-/5 to aid in ease with ambulation . GOAL NOT MET:  .  Continue Goal  Pt to be independent and compliant with HEP. GOAL NOT MET:  .  Continue Goal      Patient Education:   [x]  HEP/Education Completed: goal/objective measurements, AD use, POC, updated HEP. [employer] Access Code: [email or web address]  []  No new Education completed  [x]  Reviewed Prior HEP      []  Patient verbalized and/or demonstrated understanding of education provided. []  Patient unable to verbalize and/or demonstrate understanding of education provided. Will continue education. []  Barriers to learning:     PLAN:  Treatment Recommendations: Strengthening, Range of Motion, Balance Training, Endurance Training, Gait Training, Stair Training, Neuromuscular Re-education, Manual Therapy - Joint Manipulation, Pain Management, Home Exercise Program, Patient Education, Aquatics, and Modalities    []  Plan of care initiated. Plan to see patient 2 times per week for 5-8 weeks to address the treatment planned outlined above.   [x]  Continue with current plan of care  []  Modify plan of care as follows:    []  Hold pending physician visit  []  Discharge    Time In 0912   Time Out 1002   Timed Code Minutes: 50   Total Treatment Time: 50     Electronically Signed by: Sophia Gillette, PT

## 2023-09-18 ENCOUNTER — OFFICE VISIT (OUTPATIENT)
Dept: CARDIOLOGY CLINIC | Age: 83
End: 2023-09-18
Payer: MEDICARE

## 2023-09-18 ENCOUNTER — HOSPITAL ENCOUNTER (OUTPATIENT)
Dept: PHYSICAL THERAPY | Age: 83
Setting detail: THERAPIES SERIES
Discharge: HOME | End: 2023-09-18
Payer: MEDICARE

## 2023-09-18 VITALS
WEIGHT: 220.4 LBS | SYSTOLIC BLOOD PRESSURE: 178 MMHG | HEIGHT: 73 IN | HEART RATE: 79 BPM | DIASTOLIC BLOOD PRESSURE: 76 MMHG | BODY MASS INDEX: 29.21 KG/M2

## 2023-09-18 DIAGNOSIS — R06.02 SOB (SHORTNESS OF BREATH): Primary | ICD-10-CM

## 2023-09-18 DIAGNOSIS — I10 PRIMARY HYPERTENSION: ICD-10-CM

## 2023-09-18 DIAGNOSIS — E78.00 PURE HYPERCHOLESTEROLEMIA: ICD-10-CM

## 2023-09-18 PROCEDURE — 3077F SYST BP >= 140 MM HG: CPT | Performed by: NUCLEAR MEDICINE

## 2023-09-18 PROCEDURE — 99213 OFFICE O/P EST LOW 20 MIN: CPT | Performed by: NUCLEAR MEDICINE

## 2023-09-18 PROCEDURE — 93000 ELECTROCARDIOGRAM COMPLETE: CPT | Performed by: NUCLEAR MEDICINE

## 2023-09-18 PROCEDURE — 1123F ACP DISCUSS/DSCN MKR DOCD: CPT | Performed by: NUCLEAR MEDICINE

## 2023-09-18 PROCEDURE — G8417 CALC BMI ABV UP PARAM F/U: HCPCS | Performed by: NUCLEAR MEDICINE

## 2023-09-18 PROCEDURE — 1036F TOBACCO NON-USER: CPT | Performed by: NUCLEAR MEDICINE

## 2023-09-18 PROCEDURE — G8427 DOCREV CUR MEDS BY ELIG CLIN: HCPCS | Performed by: NUCLEAR MEDICINE

## 2023-09-18 PROCEDURE — 97110 THERAPEUTIC EXERCISES: CPT

## 2023-09-18 PROCEDURE — 3078F DIAST BP <80 MM HG: CPT | Performed by: NUCLEAR MEDICINE

## 2023-09-18 RX ORDER — CLOPIDOGREL BISULFATE 75 MG/1
75 TABLET ORAL DAILY
Qty: 90 TABLET | Refills: 3 | Status: SHIPPED | OUTPATIENT
Start: 2023-09-18

## 2023-09-18 NOTE — PROGRESS NOTES
[address] [address]  Dept: [phone]  Dept Fax: [phone]  Loc: [phone]    Visit Date: 9/18/2023    Randolph Salazar is a 82 y.o. male who presents todayfor:  Chief Complaint   Patient presents with    Follow-up     1 year follow up    Medication Refill     Asking for 90 day of plavix at a time. Coronary Artery Disease    Hypertension    Hyperlipidemia     Know RCA stent  Had hip surgery   Did okay   Some limitation   No chest pain  No changes in breathing  Higher BP today   Better at home   On statins     HPI:  HPI  Past Medical History:   Diagnosis Date    Arrhythmia     BPH (benign prostatic hyperplasia)     Dr. Devika Sagastume now    CAD (coronary artery disease)     C SILENT ISCHEMIA    Depression     Dizziness - light-headed     Erectile dysfunction     Hyperlipidemia     Hypertension     Prostate cancer ([address]) 3/7/2019    Tinnitus, subjective       Past Surgical History:   Procedure Laterality Date    CARDIAC CATHETERIZATION  12/20/2006    Multivessel CAD, diffuse in nature w/ more significant being RCA, which is site of ischemia. Normal LV function. EF 55%. CARDIOVASCULAR STRESS TEST  11/10/2006    Sinus rhythm. Frequent PVC's. No V-tach, SVT, or pauses. Abnormal Holter monitor. CARPAL TUNNEL RELEASE  01/2009    bilateral-Dr. Tyrone Angulo    CATARACT REMOVAL Bilateral 2015    Bilateral - staged    COLONOSCOPY      EYE SURGERY      HERNIA REPAIR  1970's    bilateral inguinal hernia    PROSTATE BIOPSY  07/2022    PROSTATE SURGERY      PTCA  12/2006    mid RCA    ROTATOR CUFF REPAIR           ROTATOR CUFF REPAIR  07/2018    Dr Garrick Dance  12/06/2021    TRANSTHORACIC ECHOCARDIOGRAM  12/05/2006    Preserved LV size w/ systolic function at lower limits of normal. EF 50%. Possible bicuspal AV, consider PAULA. Mild MR and TR. No significant AS or AI at current time.

## 2023-09-20 ENCOUNTER — HOSPITAL ENCOUNTER (OUTPATIENT)
Dept: PHYSICAL THERAPY | Age: 83
Setting detail: THERAPIES SERIES
Discharge: HOME | End: 2023-09-20
Payer: MEDICARE

## 2023-09-20 PROCEDURE — 97110 THERAPEUTIC EXERCISES: CPT

## 2023-09-20 NOTE — PROGRESS NOTES
stance           Objective/Goal assessment/Pt education          Interventions Next Treatment: Gait training, Hip ROM (Avoid excessive extension), and gradual strengthening progression. Activity/Treatment Tolerance:  [x]  Patient tolerated treatment well  []  Patient limited by fatigue  []  Patient limited by pain   []  Patient limited by medical complications  []  Other:     Assessment: Pt's distal quad/IT band irritability improving, allowing for progressions as indicated by * within flow sheet. Pt did demo Right trunk lean with RLE stance, will further assess leg length discrepancy vs muscle imbalance next session. Pt denied irritability of thigh/hip throughout treatment session. GOALS:  Patient Goal: Return back to walking and everyday activities     Short Term Goals: 4 weeks  Pt to demo steady gait pattern for 100 feet w/o AD for ease with household ambulation. Long Term Goals: 8 weeks  Pt to improve HOOS score from 8/24 to <=2/24 to indicate a decrease in functional limitations. Pt to demo Hip AROM WNL for ease with ADLs . Pt to demo Right hip strength of 5-/5 to aid in ease with ambulation . Pt to be independent and compliant with HEP. Patient Education:   [x]  HEP/Education Completed: body mechanics, gait mechanics, updated HEP, AD use. [employer] Access Code: [email or web address]  []  No new Education completed  []  Reviewed Prior HEP      [x]  Patient verbalized and/or demonstrated understanding of education provided. []  Patient unable to verbalize and/or demonstrate understanding of education provided. Will continue education. []  Barriers to learning:     PLAN:  Treatment Recommendations: Strengthening, Range of Motion, Balance Training, Endurance Training, Gait Training, Stair Training, Neuromuscular Re-education, Manual Therapy - Joint Manipulation, Pain Management, Home Exercise Program, Patient Education, Aquatics, and Modalities    []  Plan of care initiated.   Plan to see patient

## 2023-09-26 ENCOUNTER — HOSPITAL ENCOUNTER (OUTPATIENT)
Dept: PHYSICAL THERAPY | Age: 83
Setting detail: THERAPIES SERIES
Discharge: HOME | End: 2023-09-26
Payer: MEDICARE

## 2023-09-26 ENCOUNTER — NURSE ONLY (OUTPATIENT)
Dept: LAB | Age: 83
End: 2023-09-26

## 2023-09-26 DIAGNOSIS — C61 PROSTATE CANCER (HCC): ICD-10-CM

## 2023-09-26 DIAGNOSIS — I10 ESSENTIAL HYPERTENSION: ICD-10-CM

## 2023-09-26 LAB — PSA SERPL-MCNC: 0.89 NG/ML (ref 0–1)

## 2023-09-26 PROCEDURE — 97110 THERAPEUTIC EXERCISES: CPT

## 2023-09-26 NOTE — PROGRESS NOTES
[facility]  PHYSICAL THERAPY  [] EVALUATION  [x] DAILY NOTE (LAND) [] DAILY NOTE (AQUATIC ) [] PROGRESS NOTE [] DISCHARGE NOTE    [x] OUTPATIENT REHABILITATION CENTER [facility]   [] [address] [place] [address]    [] [facility]   [] Jay Alexis    Date: 2023  Patient Name:  Franky Watters  : 1940  MRN: [id number]  CSN: [id number]    Referring Practitioner Toña Galicia MD   Diagnosis Unilateral primary osteoarthritis, right hip [M16.11]    Treatment Diagnosis Aftercare following Right hip replacement, Difficulty in walking, right hip pain, decreased right hip ROM, decreased RLE Strength    Date of Evaluation 8/15/23    Additional Pertinent History CAD, HTN, Prostate Cancer, Arrhythmia, Depression, Tinnitus, CTR, Cataract removal, Cardiac Catherization, Bilateral inguinal hernia repair (), Prostate Sx., RTC Repair, R ADONAY DAA 23      Functional Outcome Measure Used Italia Maciel. Functional Outcome Score  (8/15/23) ,  (23)       Insurance: Primary: Payor: [employer] /  /  / ,   Secondary: [facility]   Authorization Information: OUTPATIENT BENEFITS:              DEDUCTIBLE:  $[id number]              OUT OF POCKET:  N/A              INSURANCE PAYS AT:   80%              PATIENT RESPONSIBILITY AND/OR CO-PAY:  20%  SECONDARY INSURANCE COMPANY: [address] Medicare supplement. PRECERTIFICATION REQUIRED:  No  INSURANCE THERAPY BENEFIT:  Patient has unlimited visits based on medical necessity  Benefit will not cover maintenance or preventative treatment. AQUATIC THERAPY COVERED:   Yes  MODALITIES COVERED:  Yes, with the exception of iontophoresis and hot packs/cold packs   Visit # 10, 3/10 for progress note   Visits Allowed: BMN   Recertification Date:    Physician Follow-Up:    Physician Orders: Eval and treat, DAA, No Precautions    History of Present Illness: Pt underwent Right DAA on 23, was d/c home on 23.  Pt notes that he

## 2023-09-27 ENCOUNTER — PATIENT MESSAGE (OUTPATIENT)
Dept: FAMILY MEDICINE CLINIC | Age: 83
End: 2023-09-27

## 2023-09-27 DIAGNOSIS — I10 ESSENTIAL HYPERTENSION: ICD-10-CM

## 2023-09-28 ENCOUNTER — HOSPITAL ENCOUNTER (OUTPATIENT)
Dept: PHYSICAL THERAPY | Age: 83
Setting detail: THERAPIES SERIES
Discharge: HOME | End: 2023-09-28
Payer: MEDICARE

## 2023-09-28 PROCEDURE — 97110 THERAPEUTIC EXERCISES: CPT

## 2023-09-28 RX ORDER — LOSARTAN POTASSIUM 100 MG/1
TABLET ORAL
Qty: 90 TABLET | Refills: 3 | Status: SHIPPED | OUTPATIENT
Start: 2023-09-28

## 2023-09-28 NOTE — TELEPHONE ENCOUNTER
Patient's last appointment was : 7/24/2023 with our    Patient's next appointment is : Visit date not found   Last refilled on: 8/8/2022 for 1 year    Lab Results   Component Value Date    LABA1C 5.7 08/07/2023     Lab Results   Component Value Date    CHOL 164 08/20/2021    TRIG 324 (H) 05/05/2023    HDL 33 05/15/2023    LDLCALC 67 05/15/2023    LDLDIRECT 102 (H) 12/03/2018     Lab Results   Component Value Date     08/07/2023    K 4.0 08/07/2023     08/07/2023    CO2 27 08/07/2023    BUN 20 08/07/2023    CREATININE 0.7 08/07/2023    GLUCOSE 102 08/07/2023    CALCIUM 10.1 08/07/2023    PROT 7.1 08/07/2023    LABALBU 4.0 08/07/2023    BILITOT 0.8 08/07/2023    ALKPHOS 119 08/07/2023    AST 21 08/07/2023    ALT 26 08/07/2023    LABGLOM >60 08/07/2023     Lab Results   Component Value Date    TSH 1.660 08/07/2023    T4FREE 1.06 08/24/2022     Lab Results   Component Value Date    WBC 4.9 08/07/2023    HGB 15.3 08/07/2023    HCT 46.7 08/07/2023    MCV 97.3 (H) 08/07/2023     08/07/2023

## 2023-09-28 NOTE — TELEPHONE ENCOUNTER
From: Светлана Resides  To: Dr. Kim Leblancet: 9/27/2023 9:50 AM EDT  Subject: Refills for Losartan 100 mg    I am almost out of Losartan. The [address] [place] [address] Avenue has attempted to request a refill x2 and they are stating that no one has responded to the message. This last Rx refill was ordered by Houston Cabrera. We are leaving for a week and it is imperative that I get this med. If you have any questions please call me @ [phone].  Thank you

## 2023-10-02 ENCOUNTER — HOSPITAL ENCOUNTER (OUTPATIENT)
Dept: PHYSICAL THERAPY | Age: 83
Setting detail: THERAPIES SERIES
Discharge: HOME | End: 2023-10-02
Payer: MEDICARE

## 2023-10-02 PROCEDURE — 97110 THERAPEUTIC EXERCISES: CPT

## 2023-10-02 NOTE — PROGRESS NOTES
[facility]  PHYSICAL THERAPY  [] EVALUATION  [x] DAILY NOTE (LAND) [] DAILY NOTE (AQUATIC ) [] PROGRESS NOTE [] DISCHARGE NOTE    [x] OUTPATIENT REHABILITATION CENTER [facility] [place] [facility]   [] [address] [place] [address]    [] [facility]   [] Hailey Perezey    Date: 10/2/2023  Patient Name:  Gera Cook  : 1940  MRN: [id number]  CSN: [id number]    Referring Practitioner Jc Bailey MD   Diagnosis Unilateral primary osteoarthritis, right hip [M16.11]    Treatment Diagnosis Aftercare following Right hip replacement, Difficulty in walking, right hip pain, decreased right hip ROM, decreased RLE Strength    Date of Evaluation 8/15/23    Additional Pertinent History CAD, HTN, Prostate Cancer, Arrhythmia, Depression, Tinnitus, CTR, Cataract removal, Cardiac Catherization, Bilateral inguinal hernia repair (), Prostate Sx., RTC Repair, R ADONAY DAA 23      Functional Outcome Measure Used \[facility]\"". Functional Outcome Score  (8/15/23) ,  (23)       Insurance: Primary: Payor: Jose Gonzalez /  /  / ,   Secondary: [facility]   Authorization Information: OUTPATIENT BENEFITS:              DEDUCTIBLE:  $203              OUT OF POCKET:  N/A              INSURANCE PAYS AT:   80%              PATIENT RESPONSIBILITY AND/OR CO-PAY:  20%  SECONDARY INSURANCE COMPANY: [address] Medicare supplement. PRECERTIFICATION REQUIRED:  No  INSURANCE THERAPY BENEFIT:  Patient has unlimited visits based on medical necessity  Benefit will not cover maintenance or preventative treatment. AQUATIC THERAPY COVERED:   Yes  MODALITIES COVERED:  Yes, with the exception of iontophoresis and hot packs/cold packs   Visit # 12, 5/10 for progress note   Visits Allowed: BMN   Recertification Date:    Physician Follow-Up:    Physician Orders: Eval and treat, DAA, No Precautions    History of Present Illness: Pt underwent Right DAA on 23, was d/c home on 23.  Pt notes that he

## 2023-10-03 RX ORDER — LOSARTAN POTASSIUM 100 MG/1
TABLET ORAL
Qty: 90 TABLET | Refills: 0 | OUTPATIENT
Start: 2023-10-03

## 2023-10-04 ENCOUNTER — HOSPITAL ENCOUNTER (OUTPATIENT)
Dept: PHYSICAL THERAPY | Age: 83
Setting detail: THERAPIES SERIES
Discharge: HOME | End: 2023-10-04
Payer: MEDICARE

## 2023-10-04 PROCEDURE — 97110 THERAPEUTIC EXERCISES: CPT

## 2023-10-04 NOTE — PROGRESS NOTES
[facility]  PHYSICAL THERAPY  [] EVALUATION  [x] DAILY NOTE (LAND) [] DAILY NOTE (AQUATIC ) [] PROGRESS NOTE [] DISCHARGE NOTE    [x] OUTPATIENT REHABILITATION CENTER [facility]   [] [address] [place] [address]    [] [facility]   [] Del Gu    Date: 10/4/2023  Patient Name:  Carlos Lu  : 1940  MRN: [id number]  CSN: [id number]    Referring Practitioner Luis Crenshaw MD   Diagnosis Unilateral primary osteoarthritis, right hip [M16.11]    Treatment Diagnosis Aftercare following Right hip replacement, Difficulty in walking, right hip pain, decreased right hip ROM, decreased RLE Strength    Date of Evaluation 8/15/23    Additional Pertinent History CAD, HTN, Prostate Cancer, Arrhythmia, Depression, Tinnitus, CTR, Cataract removal, Cardiac Catherization, Bilateral inguinal hernia repair (), Prostate Sx., RTC Repair, R ADONAY DAA 23      Functional Outcome Measure Used Yenny Dominguez. Functional Outcome Score  (8/15/23) ,  (23)       Insurance: Primary: Payor: Oskar Booth /  /  / ,   Secondary: [facility]   Authorization Information: OUTPATIENT BENEFITS:              DEDUCTIBLE:  $203              OUT OF POCKET:  N/A              INSURANCE PAYS AT:   80%              PATIENT RESPONSIBILITY AND/OR CO-PAY:  20%  SECONDARY INSURANCE COMPANY: [address] Medicare supplement. PRECERTIFICATION REQUIRED:  No  INSURANCE THERAPY BENEFIT:  Patient has unlimited visits based on medical necessity  Benefit will not cover maintenance or preventative treatment. AQUATIC THERAPY COVERED:   Yes  MODALITIES COVERED:  Yes, with the exception of iontophoresis and hot packs/cold packs   Visit # 13, 6/10 for progress note   Visits Allowed: BMN   Recertification Date:    Physician Follow-Up:    Physician Orders: Eval and treat, DAA, No Precautions    History of Present Illness: Pt underwent Right DAA on 23, was d/c home on 23.  Pt notes that he

## 2023-10-17 ENCOUNTER — HOSPITAL ENCOUNTER (OUTPATIENT)
Dept: PHYSICAL THERAPY | Age: 83
Setting detail: THERAPIES SERIES
Discharge: HOME | End: 2023-10-17
Payer: MEDICARE

## 2023-10-17 PROCEDURE — 97110 THERAPEUTIC EXERCISES: CPT

## 2023-10-17 NOTE — PROGRESS NOTES
[facility]  PHYSICAL THERAPY  [] EVALUATION  [x] DAILY NOTE (LAND) [] DAILY NOTE (AQUATIC ) [] PROGRESS NOTE [] DISCHARGE NOTE    [x] OUTPATIENT REHABILITATION CENTER [facility]   [] [address] [place] [address]    [] [facility]   [] Del Gu    Date: 10/17/2023  Patient Name:  Carlos Lu  : 1940  MRN: [id number]  CSN: [id number]    Referring Practitioner Luis Crenshaw MD   Diagnosis Unilateral primary osteoarthritis, right hip [M16.11]    Treatment Diagnosis Aftercare following Right hip replacement, Difficulty in walking, right hip pain, decreased right hip ROM, decreased RLE Strength    Date of Evaluation 8/15/23    Additional Pertinent History CAD, HTN, Prostate Cancer, Arrhythmia, Depression, Tinnitus, CTR, Cataract removal, Cardiac Catherization, Bilateral inguinal hernia repair (), Prostate Sx., RTC Repair, R ADONAY DAA 23      Functional Outcome Measure Used Yenny Dominguez. Functional Outcome Score  (8/15/23) ,  (23)       Insurance: Primary: Payor: Oskar Booth /  /  / ,   Secondary: [facility]   Authorization Information: OUTPATIENT BENEFITS:              DEDUCTIBLE:  $203              OUT OF POCKET:  N/A              INSURANCE PAYS AT:   80%              PATIENT RESPONSIBILITY AND/OR CO-PAY:  20%  SECONDARY INSURANCE COMPANY: [address] Medicare supplement. PRECERTIFICATION REQUIRED:  No  INSURANCE THERAPY BENEFIT:  Patient has unlimited visits based on medical necessity  Benefit will not cover maintenance or preventative treatment. AQUATIC THERAPY COVERED:   Yes  MODALITIES COVERED:  Yes, with the exception of iontophoresis and hot packs/cold packs   Visit # 14, 7/10 for progress note   Visits Allowed: BMN   Recertification Date:    Physician Follow-Up:    Physician Orders: Eval and treat, DAA, No Precautions    History of Present Illness: Pt underwent Right DAA on 23, was d/c home on 23.  Pt notes that he

## 2023-10-19 ENCOUNTER — OFFICE VISIT (OUTPATIENT)
Dept: UROLOGY | Age: 83
End: 2023-10-19
Payer: MEDICARE

## 2023-10-19 ENCOUNTER — HOSPITAL ENCOUNTER (OUTPATIENT)
Dept: PHYSICAL THERAPY | Age: 83
Setting detail: THERAPIES SERIES
Discharge: HOME | End: 2023-10-19
Payer: MEDICARE

## 2023-10-19 VITALS — HEIGHT: 73 IN | RESPIRATION RATE: 16 BRPM | WEIGHT: 220 LBS | BODY MASS INDEX: 29.16 KG/M2

## 2023-10-19 DIAGNOSIS — N39.41 URINARY INCONTINENCE, URGE: ICD-10-CM

## 2023-10-19 DIAGNOSIS — C61 PROSTATE CANCER (HCC): Primary | ICD-10-CM

## 2023-10-19 PROCEDURE — 1036F TOBACCO NON-USER: CPT | Performed by: PHYSICIAN ASSISTANT

## 2023-10-19 PROCEDURE — 1123F ACP DISCUSS/DSCN MKR DOCD: CPT | Performed by: PHYSICIAN ASSISTANT

## 2023-10-19 PROCEDURE — 81003 URINALYSIS AUTO W/O SCOPE: CPT | Performed by: PHYSICIAN ASSISTANT

## 2023-10-19 PROCEDURE — 99213 OFFICE O/P EST LOW 20 MIN: CPT | Performed by: PHYSICIAN ASSISTANT

## 2023-10-19 PROCEDURE — G8427 DOCREV CUR MEDS BY ELIG CLIN: HCPCS | Performed by: PHYSICIAN ASSISTANT

## 2023-10-19 PROCEDURE — G8484 FLU IMMUNIZE NO ADMIN: HCPCS | Performed by: PHYSICIAN ASSISTANT

## 2023-10-19 PROCEDURE — 97110 THERAPEUTIC EXERCISES: CPT

## 2023-10-19 PROCEDURE — G8417 CALC BMI ABV UP PARAM F/U: HCPCS | Performed by: PHYSICIAN ASSISTANT

## 2023-10-19 RX ORDER — OXYBUTYNIN CHLORIDE 5 MG/1
5 TABLET ORAL DAILY PRN
Qty: 30 TABLET | Refills: 3 | Status: SHIPPED | OUTPATIENT
Start: 2023-10-19

## 2023-10-19 NOTE — DISCHARGE SUMMARY
[facility]  PHYSICAL THERAPY  [] EVALUATION  [] DAILY NOTE (LAND) [] DAILY NOTE (AQUATIC ) [] PROGRESS NOTE [x] DISCHARGE NOTE    [x] OUTPATIENT REHABILITATION CENTER [facility]   [] [address] [place] [address]    [] [facility]   [] Merrill Dan    Date: 10/19/2023  Patient Name:  Verenice Louis  : 1940  MRN: [id number]  CSN: [id number]    Referring Practitioner Beverly Beltran MD   Diagnosis Unilateral primary osteoarthritis, right hip [M16.11]    Treatment Diagnosis Aftercare following Right hip replacement, Difficulty in walking, right hip pain, decreased right hip ROM, decreased RLE Strength    Date of Evaluation 8/15/23    Additional Pertinent History CAD, HTN, Prostate Cancer, Arrhythmia, Depression, Tinnitus, CTR, Cataract removal, Cardiac Catherization, Bilateral inguinal hernia repair (), Prostate Sx., RTC Repair, R ADONAY DAA 23      Functional Outcome Measure Used Robe Siad. Functional Outcome Score  (8/15/23) ,  (23) ,  (10/19/23)       Insurance: Primary: Payor: Anna Downey /  /  / ,   Secondary: [facility]   Authorization Information: OUTPATIENT BENEFITS:              DEDUCTIBLE:  $203              OUT OF POCKET:  N/A              INSURANCE PAYS AT:   80%              PATIENT RESPONSIBILITY AND/OR CO-PAY:  20%  SECONDARY INSURANCE COMPANY: [address] Medicare supplement. PRECERTIFICATION REQUIRED:  No  INSURANCE THERAPY BENEFIT:  Patient has unlimited visits based on medical necessity  Benefit will not cover maintenance or preventative treatment.   AQUATIC THERAPY COVERED:   Yes  MODALITIES COVERED:  Yes, with the exception of iontophoresis and hot packs/cold packs   Visit # 15, 0/10 for progress note   Visits Allowed: BMN   Recertification Date:    Physician Follow-Up:    Physician Orders: Eval and treat, DAA, No Precautions    History of Present Illness: Pt underwent Right DAA on 23, was d/c home on

## 2023-10-19 NOTE — PROGRESS NOTES
use: Yes     Alcohol/week: 0.0 standard drinks of alcohol     Comment: socially- 2-3 beers/HS. Drug use: No    Sexual activity: Yes     Partners: Female   Other Topics Concern    Not on file   Social History Narrative    Not on file     Social Determinants of Health     Financial Resource Strain: Low Risk  (5/15/2023)    Overall Financial Resource Strain (CARDIA)     Difficulty of Paying Living Expenses: Not hard at all   Food Insecurity: No Food Insecurity (5/15/2023)    Hunger Vital Sign     Worried About Running Out of Food in the Last Year: Never true     [address] in the Last Year: Never true   Transportation Needs: Unknown (5/15/2023)    PRAPARE - Transportation     Lack of Transportation (Medical): Not on file     Lack of Transportation (Non-Medical): No   Physical Activity: Insufficiently Active (8/8/2022)    Exercise Vital Sign     Days of Exercise per Week: 5 days     Minutes of Exercise per Session: 20 min   Stress: Not on file   Social Connections: Not on file   Intimate Partner Violence: Not on file   Housing Stability: Unknown (5/15/2023)    Housing Stability Vital Sign     Unable to Pay for Housing in the Last Year: Not on file     Number of [address] in the Last Year: Not on file     Unstable Housing in the Last Year: No       Review of Systems  Constitutional: Negative for fatigue, fever and unexpected weight change. HENT: Negative for congestion and trouble swallowing. Eyes: Negative for pain and itching. Respiratory: Negative for cough and shortness of breath. Cardiovascular: Negative for chest pain and leg swelling. Gastrointestinal: Negative for abdominal pain, constipation, diarrhea and nausea. Endocrine: Negative for cold intolerance and heat intolerance. Genitourinary: See HPI. Musculoskeletal: Negative for back pain and joint swelling. Skin: Negative for rash. Neurological: Negative for dizziness, weakness, numbness and headaches.    Psychiatric/Behavioral:

## 2023-10-19 NOTE — PATIENT INSTRUCTIONS
Start Ditropan one hour before traveling for urinary incontinence. Call if any side effects of the medication.

## 2023-10-24 ENCOUNTER — APPOINTMENT (OUTPATIENT)
Dept: PHYSICAL THERAPY | Age: 83
End: 2023-10-24
Payer: MEDICARE

## 2023-10-26 ENCOUNTER — APPOINTMENT (OUTPATIENT)
Dept: PHYSICAL THERAPY | Age: 83
End: 2023-10-26
Payer: MEDICARE

## 2023-11-01 DIAGNOSIS — E78.5 HYPERLIPIDEMIA, UNSPECIFIED HYPERLIPIDEMIA TYPE: ICD-10-CM

## 2023-11-03 RX ORDER — ATORVASTATIN CALCIUM 80 MG/1
TABLET, FILM COATED ORAL
Qty: 90 TABLET | Refills: 3 | Status: SHIPPED | OUTPATIENT
Start: 2023-11-03

## 2023-11-03 NOTE — TELEPHONE ENCOUNTER
Patient's last appointment was : 7/24/2023 with our   Patient's next appointment is : Visit date not found   Last refilled on: 8/8/2022 for 1 year  Which pharmacy does the script need sent to:  [facility] [address]      Lab Results   Component Value Date    LABA1C 5.7 08/07/2023     Lab Results   Component Value Date    CHOL 164 08/20/2021    TRIG 324 (H) 05/05/2023    HDL 33 05/15/2023    LDLCALC 67 05/15/2023    LDLDIRECT 102 (H) 12/03/2018     Lab Results   Component Value Date     08/07/2023    K 4.0 08/07/2023     08/07/2023    CO2 27 08/07/2023    BUN 20 08/07/2023    CREATININE 0.7 08/07/2023    GLUCOSE 102 08/07/2023    CALCIUM 10.1 08/07/2023    PROT 7.1 08/07/2023    LABALBU 4.0 08/07/2023    BILITOT 0.8 08/07/2023    ALKPHOS 119 08/07/2023    AST 21 08/07/2023    ALT 26 08/07/2023    LABGLOM >60 08/07/2023     Lab Results   Component Value Date    TSH 1.660 08/07/2023    T4FREE 1.06 08/24/2022     Lab Results   Component Value Date    WBC 4.9 08/07/2023    HGB 15.3 08/07/2023    HCT 46.7 08/07/2023    MCV 97.3 (H) 08/07/2023     08/07/2023

## 2024-01-10 DIAGNOSIS — I10 ESSENTIAL HYPERTENSION: ICD-10-CM

## 2024-01-10 NOTE — TELEPHONE ENCOUNTER
Patient's last appointment was : 7/24/2023 with our   Patient's next appointment is : Visit date not found with our DrLake   Last refilled on: 08/08/2022  Which pharmacy does the script need sent to: [facility]      Lab Results   Component Value Date    LABA1C 5.7 08/07/2023     Lab Results   Component Value Date    CHOL 164 08/20/2021    TRIG 324 (H) 05/05/2023    HDL 33 05/15/2023    LDLCALC 67 05/15/2023    LDLDIRECT 102 (H) 12/03/2018     Lab Results   Component Value Date     08/07/2023    K 4.0 08/07/2023     08/07/2023    CO2 27 08/07/2023    BUN 20 08/07/2023    CREATININE 0.7 08/07/2023    GLUCOSE 102 08/07/2023    CALCIUM 10.1 08/07/2023    PROT 7.1 08/07/2023    LABALBU 4.0 08/07/2023    BILITOT 0.8 08/07/2023    ALKPHOS 119 08/07/2023    AST 21 08/07/2023    ALT 26 08/07/2023    LABGLOM >60 08/07/2023     Lab Results   Component Value Date    TSH 1.660 08/07/2023    T4FREE 1.06 08/24/2022     Lab Results   Component Value Date    WBC 4.9 08/07/2023    HGB 15.3 08/07/2023    HCT 46.7 08/07/2023    MCV 97.3 (H) 08/07/2023     08/07/2023

## 2024-01-10 NOTE — TELEPHONE ENCOUNTER
----- Message from Ky Londono sent at 1/10/2024 11:20 AM EST -----  Regarding: Renewal of med  Contact: [phone]  I need a renewal of med for Amlodipine, 5 mg, qd. sent to [facility] [place].  We are leaving next week for [place]. and I have no more refills.  Thanks

## 2024-01-15 RX ORDER — AMLODIPINE BESYLATE 5 MG/1
TABLET ORAL
Qty: 90 TABLET | Refills: 0 | Status: SHIPPED | OUTPATIENT
Start: 2024-01-15

## 2024-01-15 NOTE — TELEPHONE ENCOUNTER
Pt needs appt - BP was elevated 9/23 - last visit in our office was 7/23.  I will send 90 days of medication.    This is Dr. Naya Munguia covering Dr. Zelaya's messages today.

## 2024-01-15 NOTE — TELEPHONE ENCOUNTER
Patients wife called the office back and states that he will call once they return from [place] to schedule appointment.

## 2024-01-16 ENCOUNTER — NURSE ONLY (OUTPATIENT)
Dept: LAB | Age: 84
End: 2024-01-16

## 2024-01-16 DIAGNOSIS — C61 PROSTATE CANCER (HCC): ICD-10-CM

## 2024-01-16 LAB — PSA SERPL-MCNC: 1.14 NG/ML (ref 0–1)

## 2024-01-21 ENCOUNTER — TELEPHONE (OUTPATIENT)
Dept: UROLOGY | Age: 84
End: 2024-01-21

## 2024-01-21 LAB
ALBUMIN: 4.4 G/DL
ALP BLD-CCNC: 140 U/L
ALT SERPL-CCNC: 35 U/L
ANION GAP SERPL CALCULATED.3IONS-SCNC: NORMAL MMOL/L
AST SERPL-CCNC: 38 U/L
BASOPHILS ABSOLUTE: 0.05 /ΜL
BASOPHILS RELATIVE PERCENT: 0.4 %
BILIRUB SERPL-MCNC: 1.4 MG/DL (ref 0.1–1.4)
BUN BLDV-MCNC: 17 MG/DL
CALCIUM SERPL-MCNC: 10.1 MG/DL
CHLORIDE BLD-SCNC: 106 MMOL/L
CO2: 27 MMOL/L
CREAT SERPL-MCNC: 0.68 MG/DL
EOSINOPHILS ABSOLUTE: 0.08 /ΜL
EOSINOPHILS RELATIVE PERCENT: 0.6 %
GFR, ESTIMATED: >90
GLUCOSE BLD-MCNC: 112 MG/DL
HCT VFR BLD CALC: 47.1 % (ref 41–53)
HEMOGLOBIN: 15.9 G/DL (ref 13.5–17.5)
LYMPHOCYTES ABSOLUTE: 1.38 /ΜL
LYMPHOCYTES RELATIVE PERCENT: 10.4 %
MCH RBC QN AUTO: 31.4 PG
MCHC RBC AUTO-ENTMCNC: 33.8 G/DL
MCV RBC AUTO: 93.1 FL
MONOCYTES ABSOLUTE: 0.81 /ΜL
MONOCYTES RELATIVE PERCENT: 6.1 %
NEUTROPHILS ABSOLUTE: 82 /ΜL
NEUTROPHILS RELATIVE PERCENT: 10.89 %
PLATELET # BLD: 245 K/ΜL
PMV BLD AUTO: 10.3 FL
POTASSIUM SERPL-SCNC: 4 MMOL/L
RBC # BLD: 5.06 10^6/ΜL
SODIUM BLD-SCNC: 141 MMOL/L
TOTAL PROTEIN: 8.2 G/DL (ref 6.4–8.2)
WBC # BLD: 13.3 10^3/ML

## 2024-01-21 NOTE — TELEPHONE ENCOUNTER
Please let Mr. Londono know that his PSA is up slightly from 0.89 to 1.14. He has been as high as 0.97. We will recheck in three months prior to his next appointment.

## 2024-02-14 DIAGNOSIS — N39.41 URINARY INCONTINENCE, URGE: ICD-10-CM

## 2024-02-14 RX ORDER — OXYBUTYNIN CHLORIDE 5 MG/1
TABLET ORAL
Qty: 30 TABLET | Refills: 0 | Status: SHIPPED | OUTPATIENT
Start: 2024-02-14

## 2024-02-14 NOTE — TELEPHONE ENCOUNTER
Ky Londono called requesting a refill on the following medications:  Requested Prescriptions     Pending Prescriptions Disp Refills    oxyBUTYnin (DITROPAN) 5 MG tablet [Pharmacy Med Name: Oxybutynin Chloride 5 MG Oral Tablet] 30 tablet 0     Sig: TAKE 1 TABLET BY MOUTH ONCE DAILY AS NEEDED FOR  URINARY  URGE  INCONTINENCE     Pharmacy verified:  [facility]      Date of last visit: 10/19/2023  Date of next visit (if applicable): 4/18/2024

## 2024-03-11 DIAGNOSIS — N39.41 URINARY INCONTINENCE, URGE: ICD-10-CM

## 2024-03-11 RX ORDER — OXYBUTYNIN CHLORIDE 5 MG/1
TABLET ORAL
Qty: 30 TABLET | Refills: 0 | Status: SHIPPED | OUTPATIENT
Start: 2024-03-11

## 2024-04-18 ENCOUNTER — NURSE ONLY (OUTPATIENT)
Dept: LAB | Age: 84
End: 2024-04-18

## 2024-04-18 DIAGNOSIS — C61 PROSTATE CANCER (HCC): ICD-10-CM

## 2024-04-18 LAB — PSA SERPL-MCNC: 0.96 NG/ML (ref 0–1)

## 2024-05-01 DIAGNOSIS — I10 ESSENTIAL HYPERTENSION: ICD-10-CM

## 2024-05-01 RX ORDER — AMLODIPINE BESYLATE 5 MG/1
TABLET ORAL
Qty: 90 TABLET | Refills: 3 | Status: SHIPPED | OUTPATIENT
Start: 2024-05-01

## 2024-05-01 NOTE — TELEPHONE ENCOUNTER
Patient's last appointment was : 7/24/2023 with our Dr. Watts  Patient's next appointment is : 5/21/2024 with our Dr. Saini  Last refilled on: 01/15/2024  Which pharmacy does the script need sent to: [facility] [address]      Lab Results   Component Value Date    LABA1C 5.7 08/07/2023     Lab Results   Component Value Date    CHOL 164 08/20/2021    TRIG 324 (H) 05/05/2023    HDL 33 05/15/2023    LDLDIRECT 102 (H) 12/03/2018     Lab Results   Component Value Date     08/07/2023    K 4.0 08/07/2023     08/07/2023    CO2 27 08/07/2023    BUN 20 08/07/2023    CREATININE 0.7 08/07/2023    GLUCOSE 102 08/07/2023    CALCIUM 10.1 08/07/2023    BILITOT 0.8 08/07/2023    ALKPHOS 119 08/07/2023    AST 21 08/07/2023    ALT 26 08/07/2023     Lab Results   Component Value Date    TSH 1.660 08/07/2023    T4FREE 1.06 08/24/2022     Lab Results   Component Value Date    WBC 4.9 08/07/2023    HGB 15.3 08/07/2023    HCT 46.7 08/07/2023    MCV 97.3 (H) 08/07/2023     08/07/2023

## 2024-05-11 ENCOUNTER — NURSE ONLY (OUTPATIENT)
Dept: LAB | Age: 84
End: 2024-05-11

## 2024-05-11 DIAGNOSIS — I10 ESSENTIAL HYPERTENSION: ICD-10-CM

## 2024-05-11 DIAGNOSIS — E78.5 HYPERLIPIDEMIA, UNSPECIFIED HYPERLIPIDEMIA TYPE: ICD-10-CM

## 2024-05-11 DIAGNOSIS — E78.1 HIGH BLOOD TRIGLYCERIDES: ICD-10-CM

## 2024-05-11 LAB
ALBUMIN SERPL BCG-MCNC: 3.9 G/DL (ref 3.5–5.1)
ALP SERPL-CCNC: 89 U/L (ref 38–126)
ALT SERPL W/O P-5'-P-CCNC: 32 U/L (ref 11–66)
ANION GAP SERPL CALC-SCNC: 9 MEQ/L (ref 8–16)
AST SERPL-CCNC: 30 U/L (ref 5–40)
BILIRUB SERPL-MCNC: 0.9 MG/DL (ref 0.3–1.2)
BUN SERPL-MCNC: 21 MG/DL (ref 7–22)
CALCIUM SERPL-MCNC: 9.9 MG/DL (ref 8.5–10.5)
CHLORIDE SERPL-SCNC: 102 MEQ/L (ref 98–111)
CHOLESTEROL, FASTING: 142 MG/DL (ref 100–199)
CO2 SERPL-SCNC: 28 MEQ/L (ref 23–33)
CREAT SERPL-MCNC: 0.7 MG/DL (ref 0.4–1.2)
GFR SERPL CREATININE-BSD FRML MDRD: > 90 ML/MIN/1.73M2
GLUCOSE FASTING: 101 MG/DL (ref 70–108)
HDLC SERPL-MCNC: 31 MG/DL
LDLC SERPL CALC-MCNC: 79 MG/DL
POTASSIUM SERPL-SCNC: 4.3 MEQ/L (ref 3.5–5.2)
PROT SERPL-MCNC: 7.2 G/DL (ref 6.1–8)
SODIUM SERPL-SCNC: 139 MEQ/L (ref 135–145)
T4 FREE SERPL-MCNC: 1.25 NG/DL (ref 0.93–1.68)
TRIGLYCERIDE, FASTING: 162 MG/DL (ref 0–199)
TSH SERPL DL<=0.005 MIU/L-ACNC: 2.26 UIU/ML (ref 0.4–4.2)

## 2024-05-13 ENCOUNTER — TELEPHONE (OUTPATIENT)
Dept: FAMILY MEDICINE CLINIC | Age: 84
End: 2024-05-13

## 2024-05-13 NOTE — TELEPHONE ENCOUNTER
----- Message from Odilon Saini MD sent at 5/11/2024 11:53 AM EDT -----  Labs reviewd.  Stable labs, no concerns at this time    Electronically signed by Odilon Saini MD on 5/11/2024 at 11:53 AM

## 2024-07-11 ENCOUNTER — OFFICE VISIT (OUTPATIENT)
Dept: FAMILY MEDICINE CLINIC | Age: 84
End: 2024-07-11

## 2024-07-11 VITALS
OXYGEN SATURATION: 94 % | BODY MASS INDEX: 30.14 KG/M2 | DIASTOLIC BLOOD PRESSURE: 80 MMHG | HEIGHT: 73 IN | HEART RATE: 58 BPM | SYSTOLIC BLOOD PRESSURE: 118 MMHG | RESPIRATION RATE: 12 BRPM | WEIGHT: 227.4 LBS | TEMPERATURE: 97.5 F

## 2024-07-11 DIAGNOSIS — Z00.00 MEDICARE ANNUAL WELLNESS VISIT, SUBSEQUENT: Primary | ICD-10-CM

## 2024-07-11 DIAGNOSIS — M62.830 BACK SPASM: ICD-10-CM

## 2024-07-11 DIAGNOSIS — I71.40 ABDOMINAL AORTIC ANEURYSM (AAA) WITHOUT RUPTURE, UNSPECIFIED PART (HCC): ICD-10-CM

## 2024-07-11 DIAGNOSIS — G89.29 CHRONIC LOW BACK PAIN WITHOUT SCIATICA, UNSPECIFIED BACK PAIN LATERALITY: ICD-10-CM

## 2024-07-11 DIAGNOSIS — H35.30 MACULAR DEGENERATION, UNSPECIFIED LATERALITY, UNSPECIFIED TYPE: ICD-10-CM

## 2024-07-11 DIAGNOSIS — F32.A DEPRESSION, UNSPECIFIED DEPRESSION TYPE: ICD-10-CM

## 2024-07-11 DIAGNOSIS — N40.1 BPH WITH OBSTRUCTION/LOWER URINARY TRACT SYMPTOMS: ICD-10-CM

## 2024-07-11 DIAGNOSIS — C61 PROSTATE CANCER (HCC): ICD-10-CM

## 2024-07-11 DIAGNOSIS — R73.01 IFG (IMPAIRED FASTING GLUCOSE): ICD-10-CM

## 2024-07-11 DIAGNOSIS — I72.3 ILIAC ARTERY ANEURYSM (HCC): ICD-10-CM

## 2024-07-11 DIAGNOSIS — E78.5 HYPERLIPIDEMIA, UNSPECIFIED HYPERLIPIDEMIA TYPE: ICD-10-CM

## 2024-07-11 DIAGNOSIS — N13.8 BPH WITH OBSTRUCTION/LOWER URINARY TRACT SYMPTOMS: ICD-10-CM

## 2024-07-11 DIAGNOSIS — M54.50 CHRONIC LOW BACK PAIN WITHOUT SCIATICA, UNSPECIFIED BACK PAIN LATERALITY: ICD-10-CM

## 2024-07-11 DIAGNOSIS — N52.9 ERECTILE DYSFUNCTION, UNSPECIFIED ERECTILE DYSFUNCTION TYPE: ICD-10-CM

## 2024-07-11 DIAGNOSIS — I25.10 CORONARY ARTERY DISEASE INVOLVING NATIVE HEART WITHOUT ANGINA PECTORIS, UNSPECIFIED VESSEL OR LESION TYPE: ICD-10-CM

## 2024-07-11 DIAGNOSIS — I10 ESSENTIAL HYPERTENSION: ICD-10-CM

## 2024-07-11 RX ORDER — LOSARTAN POTASSIUM 100 MG/1
TABLET ORAL
Qty: 90 TABLET | Refills: 3 | Status: SHIPPED | OUTPATIENT
Start: 2024-07-11

## 2024-07-11 RX ORDER — CYCLOBENZAPRINE HCL 10 MG
TABLET ORAL
Qty: 30 TABLET | Refills: 0 | Status: SHIPPED | OUTPATIENT
Start: 2024-07-11

## 2024-07-11 RX ORDER — MELOXICAM 15 MG/1
15 TABLET ORAL DAILY
Qty: 90 TABLET | Refills: 3 | Status: SHIPPED | OUTPATIENT
Start: 2024-07-11

## 2024-07-11 SDOH — ECONOMIC STABILITY: FOOD INSECURITY: WITHIN THE PAST 12 MONTHS, THE FOOD YOU BOUGHT JUST DIDN'T LAST AND YOU DIDN'T HAVE MONEY TO GET MORE.: NEVER TRUE

## 2024-07-11 SDOH — ECONOMIC STABILITY: FOOD INSECURITY: WITHIN THE PAST 12 MONTHS, YOU WORRIED THAT YOUR FOOD WOULD RUN OUT BEFORE YOU GOT MONEY TO BUY MORE.: NEVER TRUE

## 2024-07-11 SDOH — ECONOMIC STABILITY: INCOME INSECURITY: HOW HARD IS IT FOR YOU TO PAY FOR THE VERY BASICS LIKE FOOD, HOUSING, MEDICAL CARE, AND HEATING?: NOT HARD AT ALL

## 2024-07-11 ASSESSMENT — ENCOUNTER SYMPTOMS
VOMITING: 0
DIARRHEA: 0
BLOOD IN STOOL: 0
ANAL BLEEDING: 0
NAUSEA: 0
SHORTNESS OF BREATH: 0
CONSTIPATION: 1
CHEST TIGHTNESS: 0
ABDOMINAL PAIN: 0

## 2024-07-11 ASSESSMENT — PATIENT HEALTH QUESTIONNAIRE - PHQ9
SUM OF ALL RESPONSES TO PHQ QUESTIONS 1-9: 2
10. IF YOU CHECKED OFF ANY PROBLEMS, HOW DIFFICULT HAVE THESE PROBLEMS MADE IT FOR YOU TO DO YOUR WORK, TAKE CARE OF THINGS AT HOME, OR GET ALONG WITH OTHER PEOPLE: NOT DIFFICULT AT ALL
SUM OF ALL RESPONSES TO PHQ9 QUESTIONS 1 & 2: 0
SUM OF ALL RESPONSES TO PHQ QUESTIONS 1-9: 2
5. POOR APPETITE OR OVEREATING: NOT AT ALL
8. MOVING OR SPEAKING SO SLOWLY THAT OTHER PEOPLE COULD HAVE NOTICED. OR THE OPPOSITE, BEING SO FIGETY OR RESTLESS THAT YOU HAVE BEEN MOVING AROUND A LOT MORE THAN USUAL: NOT AT ALL
9. THOUGHTS THAT YOU WOULD BE BETTER OFF DEAD, OR OF HURTING YOURSELF: NOT AT ALL
4. FEELING TIRED OR HAVING LITTLE ENERGY: SEVERAL DAYS
7. TROUBLE CONCENTRATING ON THINGS, SUCH AS READING THE NEWSPAPER OR WATCHING TELEVISION: NOT AT ALL
SUM OF ALL RESPONSES TO PHQ QUESTIONS 1-9: 2
1. LITTLE INTEREST OR PLEASURE IN DOING THINGS: NOT AT ALL
2. FEELING DOWN, DEPRESSED OR HOPELESS: NOT AT ALL
6. FEELING BAD ABOUT YOURSELF - OR THAT YOU ARE A FAILURE OR HAVE LET YOURSELF OR YOUR FAMILY DOWN: NOT AT ALL
SUM OF ALL RESPONSES TO PHQ QUESTIONS 1-9: 2
3. TROUBLE FALLING OR STAYING ASLEEP: SEVERAL DAYS

## 2024-07-11 NOTE — PROGRESS NOTES
sounds, no masses or organomegaly  Extremities: no cyanosis, clubbing or edema  Musculoskeletal: normal range of motion, no joint swelling, deformity or tenderness  Neurologic: reflexes normal and symmetric, no cranial nerve deficit, gait, coordination and speech normal          No Known Allergies  Prior to Visit Medications    Medication Sig Taking? Authorizing Provider   cyclobenzaprine (FLEXERIL) 10 MG tablet TAKE 1/2 TO 1 (ONE-HALF TO ONE) TABLET BY MOUTH THREE TIMES DAILY AS NEEDED FOR MUSCLE SPASM Yes Quentin Zelaya MD   losartan (COZAAR) 100 MG tablet Take 1 tablet by mouth once daily Yes Quentin Zelaya MD   meloxicam (MOBIC) 15 MG tablet Take 1 tablet by mouth daily Yes Quentin Zelaya MD   amLODIPine (NORVASC) 5 MG tablet Take 1 tablet by mouth once daily Yes Quentin Zelaya MD   oxyBUTYnin (DITROPAN) 5 MG tablet TAKE 1 TABLET BY MOUTH ONCE DAILY AS NEEDED FOR  URINARY  URGE  INCONTINENCE Yes Gaudencio Varner PA-C   atorvastatin (LIPITOR) 80 MG tablet Take 1 tablet by mouth once daily Yes Madan Munguia MD   clopidogrel (PLAVIX) 75 MG tablet Take 1 tablet by mouth daily Yes Gayle Palencia MD   VITAMIN D PO Take by mouth daily Yes Rebeka Greco MD   Multiple Vitamin TABS Take 1 tablet by mouth daily Yes Rebeka Greco MD   Omega-3 Fatty Acids (FISH OIL) 1000 MG CAPS Take 2 capsules by mouth daily Yes Rebeka Greco MD   aspirin 81 MG tablet Take 1 tablet by mouth daily Yes Rebeka Greco MD   Misc Natural Products (GLUCOSAMINE CHONDROITIN ADV PO) Take 1 tablet by mouth daily Yes Rebeka Greco MD   Coenzyme Q10 (CO Q-10) 200 MG CAPS Take 1 capsule by mouth daily Yes Provider, Historical, MD       CareTeam (Including outside providers/suppliers regularly involved in providing care):   Patient Care Team:  Quentin Zelaya MD as PCP - General (Family Medicine)  Quentin Zelaya MD as PCP - Empaneled Provider  Gayle Palencia MD 
this time (Obesity, Snoring, Daytime Somnolence, Apneic Episodes)?  Patient denies any current symptoms.      Future Appointments   Date Time Provider Department Center   9/18/2024 11:00 AM Gayle Palencia MD N [facility] [place]       ASSESSMENT       Diagnosis Orders   1. Medicare annual wellness visit, subsequent        2. IFG (impaired fasting glucose)  Hemoglobin A1C    Microalbumin / Creatinine Urine Ratio    CBC with Auto Differential      3. Essential hypertension  Microalbumin / Creatinine Urine Ratio    CBC with Auto Differential    losartan (COZAAR) 100 MG tablet      4. Hyperlipidemia, unspecified hyperlipidemia type  CBC with Auto Differential      5. Coronary artery disease involving native heart without angina pectoris, unspecified vessel or lesion type  CBC with Auto Differential      6. Iliac artery aneurysm (HCC)  CTA ABDOMINAL AORTA W BILAT RUNOFF W WO CONTRAST    CBC with Auto Differential      7. Abdominal aortic aneurysm (AAA) without rupture, unspecified part (HCC)  CTA ABDOMINAL AORTA W BILAT RUNOFF W WO CONTRAST    CBC with Auto Differential      8. Depression, unspecified depression type  CBC with Auto Differential      9. Erectile dysfunction, unspecified erectile dysfunction type  CBC with Auto Differential      10. BPH with obstruction/lower urinary tract symptoms  CBC with Auto Differential      11. Macular degeneration, unspecified laterality, unspecified type  CBC with Auto Differential      12. Prostate cancer (HCC)  CBC with Auto Differential      13. Back spasm  cyclobenzaprine (FLEXERIL) 10 MG tablet    meloxicam (MOBIC) 15 MG tablet      14. Chronic low back pain without sciatica, unspecified back pain laterality  meloxicam (MOBIC) 15 MG tablet        PLAN     Will recheck CTA abdominal aorta with bilateral runoff with and without contrast as almost 2 years since last surveillance  Home BP's- Call if > 140/90 on a regular basis  Continue annual Renal Ultrasound per

## 2024-07-11 NOTE — PATIENT INSTRUCTIONS
glaucoma; cataracts, macular degeneration, and other eye disorders.  A preventive dental visit is recommended every 6 months.  Try to get at least 150 minutes of exercise per week or 10,000 steps per day on a pedometer .  Order or download the FREE \"Exercise & Physical Activity: Your Everyday Guide\" from [employer]. Call 1-544.406.2187 or search [employer] [employer] online.  You need 0028-5635 mg of calcium and 2773-5034 IU of vitamin D per day. It is possible to meet your calcium requirement with diet alone, but a vitamin D supplement is usually necessary to meet this goal.  When exposed to the sun, use a sunscreen that protects against both UVA and UVB radiation with an SPF of 30 or greater. Reapply every 2 to 3 hours or after sweating, drying off with a towel, or swimming.  Always wear a seat belt when traveling in a car. Always wear a helmet when riding a bicycle or motorcycle.

## 2024-07-12 ENCOUNTER — LAB (OUTPATIENT)
Dept: LAB | Age: 84
End: 2024-07-12

## 2024-07-12 DIAGNOSIS — H35.30 MACULAR DEGENERATION, UNSPECIFIED LATERALITY, UNSPECIFIED TYPE: ICD-10-CM

## 2024-07-12 DIAGNOSIS — C61 PROSTATE CANCER (HCC): ICD-10-CM

## 2024-07-12 DIAGNOSIS — N13.8 BPH WITH OBSTRUCTION/LOWER URINARY TRACT SYMPTOMS: ICD-10-CM

## 2024-07-12 DIAGNOSIS — I10 ESSENTIAL HYPERTENSION: ICD-10-CM

## 2024-07-12 DIAGNOSIS — N52.9 ERECTILE DYSFUNCTION, UNSPECIFIED ERECTILE DYSFUNCTION TYPE: ICD-10-CM

## 2024-07-12 DIAGNOSIS — I71.40 ABDOMINAL AORTIC ANEURYSM (AAA) WITHOUT RUPTURE, UNSPECIFIED PART (HCC): ICD-10-CM

## 2024-07-12 DIAGNOSIS — R73.01 IFG (IMPAIRED FASTING GLUCOSE): ICD-10-CM

## 2024-07-12 DIAGNOSIS — E78.5 HYPERLIPIDEMIA, UNSPECIFIED HYPERLIPIDEMIA TYPE: ICD-10-CM

## 2024-07-12 DIAGNOSIS — F32.A DEPRESSION, UNSPECIFIED DEPRESSION TYPE: ICD-10-CM

## 2024-07-12 DIAGNOSIS — N40.1 BPH WITH OBSTRUCTION/LOWER URINARY TRACT SYMPTOMS: ICD-10-CM

## 2024-07-12 DIAGNOSIS — I72.3 ILIAC ARTERY ANEURYSM (HCC): ICD-10-CM

## 2024-07-12 DIAGNOSIS — I25.10 CORONARY ARTERY DISEASE INVOLVING NATIVE HEART WITHOUT ANGINA PECTORIS, UNSPECIFIED VESSEL OR LESION TYPE: ICD-10-CM

## 2024-07-12 LAB
BASOPHILS ABSOLUTE: 0.1 THOU/MM3 (ref 0–0.1)
BASOPHILS NFR BLD AUTO: 1.8 %
CREAT UR-MCNC: 126.2 MG/DL
DEPRECATED MEAN GLUCOSE BLD GHB EST-ACNC: 105 MG/DL (ref 70–126)
DEPRECATED RDW RBC AUTO: 46.8 FL (ref 35–45)
EOSINOPHIL NFR BLD AUTO: 2.5 %
EOSINOPHILS ABSOLUTE: 0.1 THOU/MM3 (ref 0–0.4)
ERYTHROCYTE [DISTWIDTH] IN BLOOD BY AUTOMATED COUNT: 12.6 % (ref 11.5–14.5)
HBA1C MFR BLD HPLC: 5.5 % (ref 4.4–6.4)
HCT VFR BLD AUTO: 49.1 % (ref 42–52)
HGB BLD-MCNC: 16.5 GM/DL (ref 14–18)
IMM GRANULOCYTES # BLD AUTO: 0.02 THOU/MM3 (ref 0–0.07)
IMM GRANULOCYTES NFR BLD AUTO: 0.4 %
LYMPHOCYTES ABSOLUTE: 2 THOU/MM3 (ref 1–4.8)
LYMPHOCYTES NFR BLD AUTO: 35.2 %
MCH RBC QN AUTO: 33.5 PG (ref 26–33)
MCHC RBC AUTO-ENTMCNC: 33.6 GM/DL (ref 32.2–35.5)
MCV RBC AUTO: 99.8 FL (ref 80–94)
MICROALBUMIN UR-MCNC: < 1.2 MG/DL
MICROALBUMIN/CREAT RATIO PNL UR: 10 MG/G (ref 0–30)
MONOCYTES ABSOLUTE: 0.7 THOU/MM3 (ref 0.4–1.3)
MONOCYTES NFR BLD AUTO: 12.4 %
NEUTROPHILS ABSOLUTE: 2.7 THOU/MM3 (ref 1.8–7.7)
NEUTROPHILS NFR BLD AUTO: 47.7 %
NRBC BLD AUTO-RTO: 0 /100 WBC
PLATELET # BLD AUTO: 193 THOU/MM3 (ref 130–400)
PMV BLD AUTO: 10.5 FL (ref 9.4–12.4)
RBC # BLD AUTO: 4.92 MILL/MM3 (ref 4.7–6.1)
WBC # BLD AUTO: 5.6 THOU/MM3 (ref 4.8–10.8)

## 2024-07-15 ENCOUNTER — TELEPHONE (OUTPATIENT)
Dept: FAMILY MEDICINE CLINIC | Age: 84
End: 2024-07-15

## 2024-07-15 NOTE — TELEPHONE ENCOUNTER
----- Message from Quentin Zelaya MD sent at 7/14/2024  5:05 PM EDT -----  Notify pt-   Results reviewed.   CBC ok overall, except elevated MCV- would encourage daily B-Complex multivitamin and limiting alcohol usage.    SPOT MA ok  HGA1c ok at 5.5.   Follow up as planned.  ES

## 2024-07-19 ENCOUNTER — HOSPITAL ENCOUNTER (OUTPATIENT)
Dept: CT IMAGING | Age: 84
Discharge: HOME | End: 2024-07-19
Attending: FAMILY MEDICINE
Payer: MEDICARE

## 2024-07-19 DIAGNOSIS — I71.40 ABDOMINAL AORTIC ANEURYSM (AAA) WITHOUT RUPTURE, UNSPECIFIED PART (HCC): ICD-10-CM

## 2024-07-19 DIAGNOSIS — I72.3 ILIAC ARTERY ANEURYSM (HCC): ICD-10-CM

## 2024-07-19 LAB — POC CREATININE WHOLE BLOOD: 0.7 MG/DL (ref 0.5–1.2)

## 2024-07-19 PROCEDURE — 75635 CT ANGIO ABDOMINAL ARTERIES: CPT

## 2024-07-19 PROCEDURE — 6360000004 HC RX CONTRAST MEDICATION: Performed by: FAMILY MEDICINE

## 2024-07-19 PROCEDURE — 82565 ASSAY OF CREATININE: CPT

## 2024-07-19 RX ADMIN — IOPAMIDOL 115 ML: 755 INJECTION, SOLUTION INTRAVENOUS at 11:18

## 2024-07-22 ENCOUNTER — TELEPHONE (OUTPATIENT)
Dept: FAMILY MEDICINE CLINIC | Age: 84
End: 2024-07-22

## 2024-07-22 NOTE — TELEPHONE ENCOUNTER
----- Message from Quentin Zelaya MD sent at 7/21/2024  8:58 PM EDT -----  Notify pt-   Results from recent CTA abdominal aorta reviewed.   1. 4cm AAA, slightly increased in size.  2. Mildly aneurysmal common iliac arteries, slightly increased in size.  3. Median arcuate ligament deforming Celiac artery.    Plan recheck CTA abdominal aorta with bilateral runoff with and without contrast again in 12 months given mild increase in size over past 2 years.  Please let patient know.  Thanks.  Yes

## 2024-08-09 ENCOUNTER — LAB (OUTPATIENT)
Dept: LAB | Age: 84
End: 2024-08-09

## 2024-08-09 DIAGNOSIS — C61 PROSTATE CANCER (HCC): ICD-10-CM

## 2024-08-09 LAB — PSA SERPL-MCNC: 0.95 NG/ML (ref 0–1)

## 2024-08-11 NOTE — PROGRESS NOTES
biopsy with Dr. Ulloa on July 2022. 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 Decipher testing demonstrated low risk stratification. His PSA is stable at 0.95. He does not wish to undergo any surgical intervention for his [place] score increase at this time. He would like to continue with PSA levels every three months. If the PSA level persistently starts to climb, he will consider treatment options at that time. Follow up in three to six months.       2. BPH with obstruction- Better controlled. Mild symptomatology at this time. S/p TURP 9/15. On Ditropan 5 mg po once to twice daily as needed. Counseled patient on the side effects of the medication. Call with any questions or concerns. Patient was instructed to call immediately if his irritative or obstructive symptoms worsen.      3. Family history of renal cell carcinoma/Complex Left Renal Cyst-  Left renal cyst that is approximately 19 mm and stable. Patient has an extensive family history of renal cell carcinoma (brothers x 3). Repeat renal US in September 2024.     4. History of Left Hydroureteronephrosis- Asymptomatic. Found incidentally during an ED visit on a CT abdomen and pelvis for abdominal pain. Patient was severely constipated. His pain resolved once his constipation was resolved. Will check renal US to ensure this was a transient finding due to his severe constipation. Office will call patient with results.

## 2024-08-12 ENCOUNTER — TELEPHONE (OUTPATIENT)
Dept: UROLOGY | Age: 84
End: 2024-08-12

## 2024-08-12 ENCOUNTER — OFFICE VISIT (OUTPATIENT)
Dept: UROLOGY | Age: 84
End: 2024-08-12
Payer: MEDICARE

## 2024-08-12 VITALS
DIASTOLIC BLOOD PRESSURE: 82 MMHG | HEIGHT: 73 IN | WEIGHT: 229 LBS | BODY MASS INDEX: 30.35 KG/M2 | SYSTOLIC BLOOD PRESSURE: 138 MMHG

## 2024-08-12 DIAGNOSIS — N28.1 RENAL CYST, ACQUIRED: ICD-10-CM

## 2024-08-12 DIAGNOSIS — C61 PROSTATE CANCER (HCC): Primary | ICD-10-CM

## 2024-08-12 DIAGNOSIS — N39.41 URINARY INCONTINENCE, URGE: ICD-10-CM

## 2024-08-12 DIAGNOSIS — N13.39 OTHER HYDRONEPHROSIS: ICD-10-CM

## 2024-08-12 LAB
BILIRUBIN, URINE: NEGATIVE
BLOOD URINE, POC: NEGATIVE
CHARACTER, URINE: CLEAR
COLOR, UA: YELLOW
GLUCOSE URINE: NEGATIVE MG/DL
KETONES, URINE: NEGATIVE
LEUKOCYTE CLUMPS, URINE: NEGATIVE
NITRITE, URINE: NEGATIVE
PH, URINE: 7 (ref 5–9)
POST VOID RESIDUAL (PVR): 3 ML
PROTEIN, URINE: NEGATIVE MG/DL
SPECIFIC GRAVITY UA: 1.01 (ref 1–1.03)
UROBILINOGEN, URINE: 1 EU/DL (ref 0–1)

## 2024-08-12 PROCEDURE — 81003 URINALYSIS AUTO W/O SCOPE: CPT | Performed by: PHYSICIAN ASSISTANT

## 2024-08-12 PROCEDURE — 51798 US URINE CAPACITY MEASURE: CPT | Performed by: PHYSICIAN ASSISTANT

## 2024-08-12 PROCEDURE — G8427 DOCREV CUR MEDS BY ELIG CLIN: HCPCS | Performed by: PHYSICIAN ASSISTANT

## 2024-08-12 PROCEDURE — 1123F ACP DISCUSS/DSCN MKR DOCD: CPT | Performed by: PHYSICIAN ASSISTANT

## 2024-08-12 PROCEDURE — 3075F SYST BP GE 130 - 139MM HG: CPT | Performed by: PHYSICIAN ASSISTANT

## 2024-08-12 PROCEDURE — G8417 CALC BMI ABV UP PARAM F/U: HCPCS | Performed by: PHYSICIAN ASSISTANT

## 2024-08-12 PROCEDURE — 99213 OFFICE O/P EST LOW 20 MIN: CPT | Performed by: PHYSICIAN ASSISTANT

## 2024-08-12 PROCEDURE — 1036F TOBACCO NON-USER: CPT | Performed by: PHYSICIAN ASSISTANT

## 2024-08-12 PROCEDURE — 3079F DIAST BP 80-89 MM HG: CPT | Performed by: PHYSICIAN ASSISTANT

## 2024-08-12 RX ORDER — OXYBUTYNIN CHLORIDE 5 MG/1
TABLET ORAL
Qty: 180 TABLET | Refills: 3 | Status: SHIPPED | OUTPATIENT
Start: 2024-08-12

## 2024-08-12 RX ORDER — SILDENAFIL 25 MG/1
25 TABLET, FILM COATED ORAL
COMMUNITY

## 2024-08-21 ENCOUNTER — HOSPITAL ENCOUNTER (OUTPATIENT)
Dept: ULTRASOUND IMAGING | Age: 84
Discharge: HOME | End: 2024-08-21
Payer: MEDICARE

## 2024-08-21 DIAGNOSIS — N13.39 OTHER HYDRONEPHROSIS: ICD-10-CM

## 2024-08-21 PROCEDURE — 76770 US EXAM ABDO BACK WALL COMP: CPT

## 2024-09-18 ENCOUNTER — OFFICE VISIT (OUTPATIENT)
Dept: CARDIOLOGY CLINIC | Age: 84
End: 2024-09-18
Payer: MEDICARE

## 2024-09-18 VITALS
WEIGHT: 229.2 LBS | HEIGHT: 73 IN | SYSTOLIC BLOOD PRESSURE: 160 MMHG | HEART RATE: 68 BPM | DIASTOLIC BLOOD PRESSURE: 92 MMHG | BODY MASS INDEX: 30.38 KG/M2

## 2024-09-18 DIAGNOSIS — E78.00 PURE HYPERCHOLESTEROLEMIA: ICD-10-CM

## 2024-09-18 DIAGNOSIS — R06.02 SOB (SHORTNESS OF BREATH): Primary | ICD-10-CM

## 2024-09-18 DIAGNOSIS — I25.10 CORONARY ARTERY DISEASE INVOLVING NATIVE CORONARY ARTERY OF NATIVE HEART WITHOUT ANGINA PECTORIS: ICD-10-CM

## 2024-09-18 DIAGNOSIS — I10 PRIMARY HYPERTENSION: ICD-10-CM

## 2024-09-18 PROCEDURE — 3077F SYST BP >= 140 MM HG: CPT | Performed by: NUCLEAR MEDICINE

## 2024-09-18 PROCEDURE — 3080F DIAST BP >= 90 MM HG: CPT | Performed by: NUCLEAR MEDICINE

## 2024-09-18 PROCEDURE — 1036F TOBACCO NON-USER: CPT | Performed by: NUCLEAR MEDICINE

## 2024-09-18 PROCEDURE — G8427 DOCREV CUR MEDS BY ELIG CLIN: HCPCS | Performed by: NUCLEAR MEDICINE

## 2024-09-18 PROCEDURE — 1123F ACP DISCUSS/DSCN MKR DOCD: CPT | Performed by: NUCLEAR MEDICINE

## 2024-09-18 PROCEDURE — 93000 ELECTROCARDIOGRAM COMPLETE: CPT | Performed by: NUCLEAR MEDICINE

## 2024-09-18 PROCEDURE — G8417 CALC BMI ABV UP PARAM F/U: HCPCS | Performed by: NUCLEAR MEDICINE

## 2024-09-18 PROCEDURE — 99214 OFFICE O/P EST MOD 30 MIN: CPT | Performed by: NUCLEAR MEDICINE

## 2024-10-04 ENCOUNTER — PATIENT MESSAGE (OUTPATIENT)
Dept: FAMILY MEDICINE CLINIC | Age: 84
End: 2024-10-04

## 2024-10-08 ENCOUNTER — OFFICE VISIT (OUTPATIENT)
Dept: FAMILY MEDICINE CLINIC | Age: 84
End: 2024-10-08

## 2024-10-08 VITALS
TEMPERATURE: 97.9 F | SYSTOLIC BLOOD PRESSURE: 160 MMHG | DIASTOLIC BLOOD PRESSURE: 92 MMHG | WEIGHT: 229.8 LBS | BODY MASS INDEX: 30.46 KG/M2 | OXYGEN SATURATION: 98 % | HEIGHT: 73 IN | HEART RATE: 55 BPM

## 2024-10-08 DIAGNOSIS — I10 ESSENTIAL HYPERTENSION: ICD-10-CM

## 2024-10-08 DIAGNOSIS — R04.0 EPISTAXIS: Primary | ICD-10-CM

## 2024-10-08 RX ORDER — SODIUM CHLORIDE/ALOE VERA
GEL (GRAM) NASAL DAILY
Qty: 1 G | Refills: 3 | Status: SHIPPED | OUTPATIENT
Start: 2024-10-08

## 2024-10-08 RX ORDER — ECHINACEA PURPUREA EXTRACT 125 MG
1 TABLET ORAL DAILY
Qty: 1 EACH | Refills: 3 | Status: SHIPPED | OUTPATIENT
Start: 2024-10-08

## 2024-10-08 RX ORDER — ECHINACEA PURPUREA EXTRACT 125 MG
1 TABLET ORAL PRN
Qty: 1 EACH | Refills: 3 | Status: SHIPPED | OUTPATIENT
Start: 2024-10-08 | End: 2024-10-08

## 2024-10-08 RX ORDER — SODIUM CHLORIDE/ALOE VERA
GEL (GRAM) NASAL PRN
Qty: 1 G | Refills: 0 | Status: SHIPPED | OUTPATIENT
Start: 2024-10-08 | End: 2024-10-08

## 2024-10-08 ASSESSMENT — ENCOUNTER SYMPTOMS
SHORTNESS OF BREATH: 0
VOMITING: 0
NAUSEA: 0
RHINORRHEA: 0
COUGH: 0

## 2024-10-08 NOTE — PROGRESS NOTES
[facility] PROFESSIONAL SERVS  [facility]  [address]  [facility] [address]  Dept: [phone]  Dept Fax: [phone]  Loc: [phone]  Resident Note    Patient:Ky Londono Sex: male  YOB: 1940 Age:83 y.o.  MRN: [id number]  Assessment & Plan   1. Epistaxis  - Acute, not controlled, occurring daily. Dried blood visualized in right nostril with mucous  - Suspect could be component of poorly controlled HTN as patient denies recent trauma, allergies, nasal steroid use, tobacco smoking, and no change with stopping anticoagulants   - Recent CBC with normal Hgb, platelets, and WBC  - Sending Nasal spray for patient to apply daily. Also sending Ayrgel for patient to apply daily and keep nasal passages moist while clot present  - Recommended patient spray Afrin in a cotton ball and place in nostril if epistaxis recurs with pressure  - Recommended humidified air at night  - Management of HTN as below  - Referring to ENT   Orders  - Harpal Oglesby MD, Otolaryngology, [place]  - saline nasal gel (AYR) GEL; by Nasal route daily  Dispense: 1 g; Refill: 3  - sodium chloride (OCEAN) 0.65 % nasal spray; 1 spray by Nasal route daily  Dispense: 1 each; Refill: 3    2. Essential hypertension  - /90 with repeat 160/92 in office. Discordant from home BP readings. Concern that HTN may be contributing to nose bleeds.  - Recommended patient check BP in the morning and night every day for the next week and record when he has nose bleeds  - Continue Losartan 100mg and Amlodipine 5mg  - RTC: 1 week to review BP logs. May need to increase Amlodipine to 10mg daily if hypertensive      Health Maintenance/Vaccinations  - Immunizations: COVID x4, PNA x2, TDaP (6/15/2017), Zoster x3   Age appropriate for Influenza  - Colon Cancer Screenin/10/24 Colonoscopy with Dr. Carter and no f/u indicated  - Lung Cancer Screening: Former tobacco use with 4.5py

## 2024-10-10 ENCOUNTER — TELEPHONE (OUTPATIENT)
Dept: OTHER | Age: 84
End: 2024-10-10

## 2024-10-10 NOTE — TELEPHONE ENCOUNTER
Could we ask patient to bring his home blood pressure cuff with him to his follow-up appointment on 10/15 please? I'd like to compare it to our machines.    Thank you!    Electronically signed by Chen Kim DO on 10/10/2024 at 2:17 PM

## 2024-10-15 ENCOUNTER — TELEPHONE (OUTPATIENT)
Dept: UROLOGY | Age: 84
End: 2024-10-15

## 2024-10-15 ENCOUNTER — OFFICE VISIT (OUTPATIENT)
Dept: FAMILY MEDICINE CLINIC | Age: 84
End: 2024-10-15

## 2024-10-15 VITALS
SYSTOLIC BLOOD PRESSURE: 130 MMHG | DIASTOLIC BLOOD PRESSURE: 76 MMHG | RESPIRATION RATE: 15 BRPM | HEART RATE: 77 BPM | BODY MASS INDEX: 30.46 KG/M2 | WEIGHT: 229.8 LBS | OXYGEN SATURATION: 98 % | TEMPERATURE: 98.1 F | HEIGHT: 73 IN

## 2024-10-15 DIAGNOSIS — R04.0 EPISTAXIS: ICD-10-CM

## 2024-10-15 DIAGNOSIS — I10 ESSENTIAL HYPERTENSION: Primary | ICD-10-CM

## 2024-10-15 RX ORDER — AMLODIPINE BESYLATE 10 MG/1
10 TABLET ORAL DAILY
Qty: 90 TABLET | Refills: 3 | Status: SHIPPED | OUTPATIENT
Start: 2024-10-15

## 2024-10-15 NOTE — PROGRESS NOTES
S: 83 y.o. male with   Chief Complaint   Patient presents with    Follow-up     Pt states he is still having nosebleeds but they seem less frequent.        HPI: please see resident note for HPI and ROS.    BP Readings from Last 3 Encounters:   10/15/24 130/76   10/08/24 (!) 160/92   09/18/24 (!) 160/92     Wt Readings from Last 3 Encounters:   10/15/24 104.2 kg (229 lb 12.8 oz)   10/08/24 104.2 kg (229 lb 12.8 oz)   09/18/24 104 kg (229 lb 3.2 oz)       O: VS:  height is 1.854 m (6' 1\") and weight is 104.2 kg (229 lb 12.8 oz). His oral temperature is 98.1 °F (36.7 °C). His blood pressure is 130/76 and his pulse is 77. His respiration is 15 and oxygen saturation is 98%.   AAO/NAD, appropriate affect for mood       Diagnosis Orders   1. Essential hypertension  amLODIPine (NORVASC) 10 MG tablet      2. Epistaxis            Plan:  Please refer to resident note for full plan.    83-year-old male here for follow up on nosebleeds. BP is stable today but he is having some elevated BP readings at home. Nosebleeds have been less frequent, but still having some episodes with elevated BP >160 systolic. He is supposed to be taking Plavix and ASA but D/C these last week. Recommended to discuss this with cardiologist. Agree with continuing Losartan 100 mg daily and increasing Amlodipine to 10 mg daily. Follow up in 2-4 weeks for BP recheck.     Health Maintenance Due   Topic Date Due    Respiratory Syncytial Virus (RSV) Pregnant or age 60 yrs+ (1 - 1-dose 60+ series) Never done    Flu vaccine (1) 08/01/2024    COVID-19 Vaccine (6 - 2023-24 season) 09/01/2024       Attending Physician Statement  I have discussed the case, including pertinent history and exam findings with the resident. I also have seen the patient and performed key portions of the examination.  I agree with the documented assessment and plan as documented by the resident.        Sherice Alcantara DO 10/17/2024 1:08 PM    
HERNIA REPAIR  1970's    bilateral inguinal hernia    HIP SURGERY  08/2023    hip replacement    PROSTATE BIOPSY  07/2022    PROSTATE SURGERY      PTCA  12/2006    mid RCA    ROTATOR CUFF REPAIR           ROTATOR CUFF REPAIR  07/2018    Dr De Jesus OIMACKENZIE    SHOULDER SURGERY  12/06/2021    TRANSTHORACIC ECHOCARDIOGRAM  12/05/2006    Preserved LV size w/ systolic function at lower limits of normal. EF 50%. Possible bicuspal AV, consider PAULA. Mild MR and TR. No significant AS or AI at current time.     TURP  08/14/2014    TURP  09/01/2015        Social History   reports that he quit smoking about 57 years ago. His smoking use included cigarettes. He started smoking about 60 years ago. He has a 4.5 pack-year smoking history. He has never used smokeless tobacco. He reports current alcohol use. He reports that he does not use drugs.     Medications  Current Outpatient Medications   Medication Sig Dispense Refill    saline nasal gel (AYR) GEL by Nasal route daily 1 g 3    sodium chloride (OCEAN) 0.65 % nasal spray 1 spray by Nasal route daily 1 each 3    oxyBUTYnin (DITROPAN) 5 MG tablet Take one tablet twice daily as needed for urinary urge incontinence. 180 tablet 3    sildenafil (VIAGRA) 25 MG tablet Take 1 tablet by mouth      cyclobenzaprine (FLEXERIL) 10 MG tablet TAKE 1/2 TO 1 (ONE-HALF TO ONE) TABLET BY MOUTH THREE TIMES DAILY AS NEEDED FOR MUSCLE SPASM 30 tablet 0    losartan (COZAAR) 100 MG tablet Take 1 tablet by mouth once daily 90 tablet 3    meloxicam (MOBIC) 15 MG tablet Take 1 tablet by mouth daily 90 tablet 3    amLODIPine (NORVASC) 5 MG tablet Take 1 tablet by mouth once daily 90 tablet 3    atorvastatin (LIPITOR) 80 MG tablet Take 1 tablet by mouth once daily 90 tablet 3    clopidogrel (PLAVIX) 75 MG tablet Take 1 tablet by mouth daily 90 tablet 3    VITAMIN D PO Take by mouth daily      Multiple Vitamin TABS Take 1 tablet by mouth daily      Omega-3 Fatty Acids (FISH OIL) 1000 MG CAPS Take 2 capsules 
it continues despite well-controlled blood pressures.          Preventative Care Gaps: Are appropriate based on today's review and evaluation  Patient Education Reviewed: Disease process and treatment, Prevention, detection, and treatment of acute complications, Develop strategies to promote health/ change behaviors, Using medications safely    Future Appointments   Date Time Provider Department Center   1/16/2025  2:20 PM Alma Sanchez DO [facility] DEP   3/10/2025  9:15 AM Coreen Mathews PA-C N [place] [facility] [place]   7/31/2025  9:30 AM Quentin Zelaya MD [facility] DEP   9/25/2025 11:45 AM Gayle Palencia MD N [facility] [place]       No follow-ups on file.      An electronic signature was used to authenticate this note  - Raysa Rivera DO PGY-1 on 10/15/2024 at 5:02 PM

## 2024-10-15 NOTE — TELEPHONE ENCOUNTER
Please let patient know that he has a stable left renal cyst on his renal US. We will plan surveillance imaging in one year.

## 2024-10-18 ENCOUNTER — OFFICE VISIT (OUTPATIENT)
Dept: ENT CLINIC | Age: 84
End: 2024-10-18

## 2024-10-18 ENCOUNTER — TELEPHONE (OUTPATIENT)
Dept: ENT CLINIC | Age: 84
End: 2024-10-18

## 2024-10-18 VITALS
SYSTOLIC BLOOD PRESSURE: 142 MMHG | HEART RATE: 90 BPM | BODY MASS INDEX: 30.1 KG/M2 | DIASTOLIC BLOOD PRESSURE: 78 MMHG | OXYGEN SATURATION: 93 % | TEMPERATURE: 97.8 F | HEIGHT: 73 IN | RESPIRATION RATE: 18 BRPM | WEIGHT: 227.1 LBS

## 2024-10-18 DIAGNOSIS — J34.89 NASAL DRYNESS: ICD-10-CM

## 2024-10-18 DIAGNOSIS — R04.0 RECURRENT EPISTAXIS: ICD-10-CM

## 2024-10-18 DIAGNOSIS — R04.0 ACUTE ANTERIOR EPISTAXIS: Primary | ICD-10-CM

## 2024-10-18 NOTE — PROGRESS NOTES
and speech pattern appropriate for age and gender. No distress. No stridor or audible wheezing.   HENT:   Head: Normocephalic and atraumatic.   Nose:  Pt initially with tampon in R nares which was removed. External nose normal. Septum deviation normal. Nasal mucosa with R sided area of oozing blood. Nasal turbinates normal. No lesions noted.  Mouth/Throat:  Fair dentition. Oral cavity mucosa normal, no masses or lesions noted. Soft palate normal.  Uvula midline.  Tonsils atrophic.   Eyes:  Pupils are equal, round, and reactive to light. Conjunctivae and EOM are normal.   Neck:  Normal range of motion. Neck supple. No JVD present. No tracheal deviation present. No thyromegaly present. No cervical lymphadenopathy noted.  Cardiovascular:  Normal rate.   Pulmonary/Chest:  Effort normal. No stridor or stertor. No respiratory distress.   Musculoskeletal:  Normal range of motion. No edema or lymphadenopathy.  Neurological:  Alert and oriented to person, place, and time.   Cranial nerve II-XII grossly intact.  Skin:  Skin is warm. No erythema.   Psychiatric:  Normal mood and affect. Behavior is normal.     Vitals reviewed.    Procedure: After verbal consent obtained: R nare instilled with Afrin and 4% Lidocaine to area of oozing on nasal septum. After appropriate period of time; Silver nitrate cautery performed lightly over area without complication. Hemostasis achieved. Excess silver nitrate removed with moistened Q-tip. Patient tolerated well.     Data:  All of the past medical history, past surgical history, family history,social history, allergies and current medications were reviewed with the patient.    Lab Results   Component Value Date    WBC 5.6 07/12/2024    HGB 16.5 07/12/2024    HCT 49.1 07/12/2024    MCV 99.8 (H) 07/12/2024     07/12/2024        Assessment & Plan   Assessment & Plan   Diagnoses and all orders for this visit:     Diagnosis Orders   1. Acute anterior epistaxis        2. Recurrent epistaxis

## 2024-10-18 NOTE — TELEPHONE ENCOUNTER
Patient called in stating that he has been having bad nosebleeds every day for the last 3 weeks that last 30 minutes to a hour. He stated that he currently has an active nosebleed that has been going on for about a hour and a half. He also stated that he has been applying pressure and has been using affrin but nothing has helped. Please advise

## 2024-10-18 NOTE — PATIENT INSTRUCTIONS
Avoid rubbing, blowing, picking nose. Avoid strenuous activity, bending, lifting, pulling.    Humidifier. Nasal saline mist and nasal saline gel may be helpful to moisturize your nose as well.    Management of epistaxis as described below  Afrin 3 sprays to bleeding nostril TID for 3 days. Discussed risks of rebound congestion/rhinitis medicamentosa from prolonged use  Apply nasal saline/ointment in the nose at least twice daily to keep mucosa moist. If using ointment, apply to lateral wall of the nose and lightly pinch the nostrils together to spread the ointment to avoid recurrent trauma to the septum  Reviewed management of acute epistaxis at home with Afrin and direct pressure (fingers or clamp)    -Follow up In 2 weeks and as needed after for recurrent epistaxis or other ENT concerns

## 2024-10-28 DIAGNOSIS — I10 ESSENTIAL HYPERTENSION: ICD-10-CM

## 2024-10-28 RX ORDER — LOSARTAN POTASSIUM 100 MG/1
TABLET ORAL
Qty: 90 TABLET | Refills: 3 | OUTPATIENT
Start: 2024-10-28

## 2024-10-31 NOTE — PROGRESS NOTES
Assessment & Plan   Diagnoses and all orders for this visit:     Diagnosis Orders   1. Recurrent epistaxis        2. Acute anterior epistaxis        3. Nasal dryness        4. History of nasal cauterization            The findings were explained and his questions were answered.  Pt with one minor episode of epistaxis since last visit, area of bleeding was re-cauterized. Reviewed epistaxis management with pt and gave him a nose clamp. Offered follow up in a couple of months vs PRN, which pt chose PRN follow up for recurrent epistaxis if he has recurrence.      Return if symptoms worsen or fail to improve, for Recurrent epistaxis.           **This report has been created using voice recognition software. It may contain minor errors which are inherent in voice recognition technology.**    Electronically signed by Nohemi Teixeira PA-C on 11/4/2024 at 9:02 AM

## 2024-11-01 ENCOUNTER — OFFICE VISIT (OUTPATIENT)
Dept: ENT CLINIC | Age: 84
End: 2024-11-01
Payer: MEDICARE

## 2024-11-01 VITALS
HEIGHT: 73 IN | SYSTOLIC BLOOD PRESSURE: 136 MMHG | WEIGHT: 231.2 LBS | DIASTOLIC BLOOD PRESSURE: 86 MMHG | TEMPERATURE: 96.8 F | BODY MASS INDEX: 30.64 KG/M2 | HEART RATE: 57 BPM | OXYGEN SATURATION: 96 %

## 2024-11-01 DIAGNOSIS — R04.0 RECURRENT EPISTAXIS: Primary | ICD-10-CM

## 2024-11-01 DIAGNOSIS — Z98.890 HISTORY OF NASAL CAUTERIZATION: ICD-10-CM

## 2024-11-01 DIAGNOSIS — R04.0 ACUTE ANTERIOR EPISTAXIS: ICD-10-CM

## 2024-11-01 DIAGNOSIS — J34.89 NASAL DRYNESS: ICD-10-CM

## 2024-11-01 PROCEDURE — 30901 CONTROL OF NOSEBLEED: CPT

## 2024-11-01 NOTE — PATIENT INSTRUCTIONS
Afrin 3 sprays in right nostril 2-3 times a day for the next 3 days.     Management of epistaxis as described below  Afrin 3 sprays to bleeding nostril TID for 3 days. Discussed risks of rebound congestion/rhinitis medicamentosa from prolonged use  Apply nasal saline/ointment in the nose at least twice daily to keep mucosa moist. If using ointment, apply to lateral wall of the nose and lightly pinch the nostrils together to spread the ointment to avoid recurrent trauma to the septum  Reviewed management of acute epistaxis at home with Afrin and direct pressure (fingers or clamp)    -Follow up PRN for recurrent epistaxis or other ENT concerns

## 2024-11-14 ENCOUNTER — TELEPHONE (OUTPATIENT)
Dept: FAMILY MEDICINE CLINIC | Age: 84
End: 2024-11-14

## 2024-11-14 DIAGNOSIS — I10 ESSENTIAL HYPERTENSION: ICD-10-CM

## 2024-11-14 NOTE — TELEPHONE ENCOUNTER
Wife called in for refill. Chart states patient has refills until July. This nurse called [facility] to confirm. They are filling and will let patient know

## 2025-01-16 ENCOUNTER — OFFICE VISIT (OUTPATIENT)
Dept: FAMILY MEDICINE CLINIC | Age: 85
End: 2025-01-16

## 2025-01-16 VITALS
HEIGHT: 73 IN | RESPIRATION RATE: 18 BRPM | DIASTOLIC BLOOD PRESSURE: 82 MMHG | OXYGEN SATURATION: 96 % | HEART RATE: 78 BPM | WEIGHT: 230.6 LBS | SYSTOLIC BLOOD PRESSURE: 130 MMHG | TEMPERATURE: 97.8 F | BODY MASS INDEX: 30.56 KG/M2

## 2025-01-16 DIAGNOSIS — E78.5 HYPERLIPIDEMIA, UNSPECIFIED HYPERLIPIDEMIA TYPE: ICD-10-CM

## 2025-01-16 DIAGNOSIS — M54.50 CHRONIC LOW BACK PAIN WITHOUT SCIATICA, UNSPECIFIED BACK PAIN LATERALITY: ICD-10-CM

## 2025-01-16 DIAGNOSIS — G89.29 CHRONIC LOW BACK PAIN WITHOUT SCIATICA, UNSPECIFIED BACK PAIN LATERALITY: ICD-10-CM

## 2025-01-16 DIAGNOSIS — I10 ESSENTIAL HYPERTENSION: Primary | ICD-10-CM

## 2025-01-16 DIAGNOSIS — M62.830 BACK SPASM: ICD-10-CM

## 2025-01-16 DIAGNOSIS — I72.3 ILIAC ARTERY ANEURYSM (HCC): ICD-10-CM

## 2025-01-16 DIAGNOSIS — C61 PROSTATE CANCER (HCC): ICD-10-CM

## 2025-01-16 DIAGNOSIS — Z13.29 SCREENING FOR THYROID DISORDER: ICD-10-CM

## 2025-01-16 DIAGNOSIS — I71.40 ABDOMINAL AORTIC ANEURYSM (AAA) WITHOUT RUPTURE, UNSPECIFIED PART (HCC): ICD-10-CM

## 2025-01-16 DIAGNOSIS — R73.01 IFG (IMPAIRED FASTING GLUCOSE): ICD-10-CM

## 2025-01-16 RX ORDER — MELOXICAM 15 MG/1
15 TABLET ORAL DAILY
Qty: 90 TABLET | Refills: 3 | Status: SHIPPED | OUTPATIENT
Start: 2025-01-16

## 2025-01-16 RX ORDER — ATORVASTATIN CALCIUM 80 MG/1
TABLET, FILM COATED ORAL
Qty: 90 TABLET | Refills: 3 | Status: SHIPPED | OUTPATIENT
Start: 2025-01-16

## 2025-01-16 RX ORDER — LOSARTAN POTASSIUM 100 MG/1
TABLET ORAL
Qty: 90 TABLET | Refills: 3 | Status: SHIPPED | OUTPATIENT
Start: 2025-01-16

## 2025-01-16 RX ORDER — AMLODIPINE BESYLATE 10 MG/1
10 TABLET ORAL DAILY
Qty: 90 TABLET | Refills: 3 | Status: SHIPPED | OUTPATIENT
Start: 2025-01-16

## 2025-01-16 SDOH — ECONOMIC STABILITY: FOOD INSECURITY: WITHIN THE PAST 12 MONTHS, THE FOOD YOU BOUGHT JUST DIDN'T LAST AND YOU DIDN'T HAVE MONEY TO GET MORE.: PATIENT DECLINED

## 2025-01-16 SDOH — ECONOMIC STABILITY: FOOD INSECURITY: WITHIN THE PAST 12 MONTHS, YOU WORRIED THAT YOUR FOOD WOULD RUN OUT BEFORE YOU GOT MONEY TO BUY MORE.: PATIENT DECLINED

## 2025-01-16 ASSESSMENT — ENCOUNTER SYMPTOMS
CONSTIPATION: 0
ABDOMINAL PAIN: 0
SHORTNESS OF BREATH: 0
DIARRHEA: 0
CHEST TIGHTNESS: 0
COUGH: 0
VOMITING: 0
NAUSEA: 0

## 2025-01-16 NOTE — PROGRESS NOTES
Patient:Ky Londono  YOB: 1940   MRN:[id number]    Subjective   84 y.o. male who presents for the following: Follow-up (Patient in office today for 6 month follow up. Patient states things have been going well, no chief complaints for today's visit. )    Patient is here today for a 6 month follow up. Patient is overall doing well. No concerns for today.       Review of Systems   Constitutional:  Negative for activity change, appetite change, chills, diaphoresis and fatigue.   Respiratory:  Negative for cough, chest tightness and shortness of breath.    Cardiovascular:  Negative for chest pain.   Gastrointestinal:  Negative for abdominal pain, constipation, diarrhea, nausea and vomiting.     PMHx: He has a past medical history of Arrhythmia, BPH (benign prostatic hyperplasia), CAD (coronary artery disease), Depression, Dizziness - light-headed, Erectile dysfunction, Hyperlipidemia, Hypertension, Prostate cancer (HCC), and Tinnitus, subjective.    Objective     Vitals:    01/16/25 1423   BP: 130/82   Site: Left Upper Arm   Position: Sitting   Cuff Size: Large Adult   Pulse: 78   Resp: 18   Temp: 97.8 °F (36.6 °C)   TempSrc: Oral   SpO2: 96%   Weight: 104.6 kg (230 lb 9.6 oz)   Height: 1.854 m (6' 1\")   Body mass index is 30.42 kg/m².    Physical Exam  Constitutional:       General: He is not in acute distress.     Appearance: Normal appearance. He is obese. He is not ill-appearing, toxic-appearing or diaphoretic.   HENT:      Head: Normocephalic and atraumatic.      Right Ear: External ear normal.      Left Ear: External ear normal.      Nose: Nose normal.   Cardiovascular:      Rate and Rhythm: Normal rate and regular rhythm.      Pulses: Normal pulses.      Heart sounds: Normal heart sounds. No murmur heard.     No friction rub. No gallop.   Pulmonary:      Effort: Pulmonary effort is normal. No respiratory distress.      Breath sounds: Normal breath sounds. No stridor. No wheezing, rhonchi 
Ratio, Urine      2. IFG (impaired fasting glucose)  Hemoglobin A1C      3. Hyperlipidemia, unspecified hyperlipidemia type  atorvastatin (LIPITOR) 80 MG tablet    Hemoglobin A1C    Lipid Panel      4. Abdominal aortic aneurysm (AAA) without rupture, unspecified part (HCC)  CTA ABDOMINAL AORTA W BILAT RUNOFF W WO CONTRAST      5. Iliac artery aneurysm (HCC)  CTA ABDOMINAL AORTA W BILAT RUNOFF W WO CONTRAST      6. Back spasm  meloxicam (MOBIC) 15 MG tablet      7. Chronic low back pain without sciatica, unspecified back pain laterality  meloxicam (MOBIC) 15 MG tablet      8. Screening for thyroid disorder  TSH reflex to FT4      9. Prostate cancer (HCC)            PLAN      After discussion with patient and resident physician, we agreed on plan as follows:    Check HG A1c, FLP, CMP, spot MA, TSH with reflex free T4, and CBC in 6 months  Check CTA with runoff after 7/19/2025 due to history of known iliac/aortic aneurysms  Continue current medications otherwise  Refills if needed  Follow-up in 6 months as scheduled with me or sooner if problems        Preventive Health Topics (updated 1/16/2025):  Encouraged annual FLU VACCINE   Encouraged RSV vaccine- pt to check with local preferred pharmacy.  COLONOSCOPY done 1/10/2024 per Dr. Carter-no follow-up colonoscopy indicated.   JOSEFA and PSA management Urology/ Coreen Mathews          Attending Physician Statement  I have discussed the case, including pertinent history and exam findings with the resident. I also have seen the patient and performed key portions of the examination.  I agree with the documented assessment and plan as documented by the resident.  GC modifier added  to this encounter     Electronically signed by Quentin Zelaya MD on 1/16/2025 at 10:56 PM

## 2025-01-17 RX ORDER — CLOPIDOGREL BISULFATE 75 MG/1
75 TABLET ORAL DAILY
Qty: 90 TABLET | Refills: 2 | Status: SHIPPED | OUTPATIENT
Start: 2025-01-17

## 2025-01-17 NOTE — TELEPHONE ENCOUNTER
Ky is requesting a refill of their   Requested Prescriptions     Pending Prescriptions Disp Refills    clopidogrel (PLAVIX) 75 MG tablet 90 tablet 3     Sig: Take 1 tablet by mouth daily   . Please advise.      Last Appt:  Visit date not found  Next Appt:   Visit date not found  Preferred pharmacy:   [id number] - [place], [place] - [address] - P [phone] - F [phone]

## 2025-03-04 ENCOUNTER — LAB (OUTPATIENT)
Dept: LAB | Age: 85
End: 2025-03-04

## 2025-03-04 DIAGNOSIS — C61 PROSTATE CANCER (HCC): ICD-10-CM

## 2025-03-04 LAB — PSA SERPL-MCNC: 1.04 NG/ML (ref 0–1)

## 2025-03-10 ENCOUNTER — OFFICE VISIT (OUTPATIENT)
Dept: UROLOGY | Age: 85
End: 2025-03-10
Payer: MEDICARE

## 2025-03-10 VITALS — BODY MASS INDEX: 31.17 KG/M2 | HEIGHT: 73 IN | WEIGHT: 235.2 LBS | RESPIRATION RATE: 16 BRPM

## 2025-03-10 DIAGNOSIS — C61 PROSTATE CANCER (HCC): Primary | ICD-10-CM

## 2025-03-10 DIAGNOSIS — N39.41 URINARY INCONTINENCE, URGE: ICD-10-CM

## 2025-03-10 LAB
BILIRUBIN, URINE: NEGATIVE
BLOOD URINE, POC: NEGATIVE
CHARACTER, URINE: CLEAR
COLOR, UA: YELLOW
GLUCOSE URINE: NEGATIVE MG/DL
KETONES, URINE: NEGATIVE
LEUKOCYTE CLUMPS, URINE: NEGATIVE
NITRITE, URINE: NEGATIVE
PH, URINE: 6 (ref 5–9)
POST VOID RESIDUAL (PVR): 30 ML
PROTEIN, URINE: NEGATIVE MG/DL
SPECIFIC GRAVITY UA: 1.01 (ref 1–1.03)
UROBILINOGEN, URINE: 0.2 EU/DL (ref 0–1)

## 2025-03-10 PROCEDURE — 81003 URINALYSIS AUTO W/O SCOPE: CPT | Performed by: PHYSICIAN ASSISTANT

## 2025-03-10 PROCEDURE — G8417 CALC BMI ABV UP PARAM F/U: HCPCS | Performed by: PHYSICIAN ASSISTANT

## 2025-03-10 PROCEDURE — 1159F MED LIST DOCD IN RCRD: CPT | Performed by: PHYSICIAN ASSISTANT

## 2025-03-10 PROCEDURE — G8427 DOCREV CUR MEDS BY ELIG CLIN: HCPCS | Performed by: PHYSICIAN ASSISTANT

## 2025-03-10 PROCEDURE — 1036F TOBACCO NON-USER: CPT | Performed by: PHYSICIAN ASSISTANT

## 2025-03-10 PROCEDURE — 1123F ACP DISCUSS/DSCN MKR DOCD: CPT | Performed by: PHYSICIAN ASSISTANT

## 2025-03-10 PROCEDURE — 99213 OFFICE O/P EST LOW 20 MIN: CPT | Performed by: PHYSICIAN ASSISTANT

## 2025-03-10 PROCEDURE — 51798 US URINE CAPACITY MEASURE: CPT | Performed by: PHYSICIAN ASSISTANT

## 2025-03-10 NOTE — PROGRESS NOTES
05/11/2024    CO2 28 05/11/2024       Lab Results   Component Value Date    PSA 1.04 (H) 03/04/2025    PSA 0.95 08/09/2024    PSA 0.96 04/18/2024         Assessment/Plan:    1. Prostate cancer- Status post prostate biopsy with Dr. Ulloa on July 2022. 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 Decipher testing demonstrated low risk stratification. He is on active surveillance. His March 2025 PSA is relatively stable at 1.04. He does not wish to undergo any surgical intervention for his Miles score increase at this time as long as his PSA remains relatively stable. If the PSA level persistently starts to climb, he will consider treatment options at that time. Follow up in three to six months.       2. BPH with obstruction- Better controlled. Mild symptomatology at this time. S/p TURP 9/15. On Ditropan 5 mg po once to twice daily as needed. Counseled patient on the side effects of the medication. Call with any questions or concerns. Patient was instructed to call immediately if his irritative or obstructive symptoms worsen.      3. Family history of renal cell carcinoma/Complex Left Renal Cyst-  Left renal cyst that is approximately 19 mm and stable. Patient has an extensive family history of renal cell carcinoma (brothers x 3). Repeat renal US in August 2024 without any significant findings. Scheduled for a CTA Abdomen in July 2025.     4. History of Left Hydroureteronephrosis- Asymptomatic. Found incidentally during an ED visit on a CT abdomen and pelvis for abdominal pain. Patient was severely constipated. His pain resolved once his constipation was resolved. Renal US August 2024 demonstrated resolution.

## 2025-07-14 PROBLEM — I71.40 ABDOMINAL AORTIC ANEURYSM (AAA) WITHOUT RUPTURE: Status: ACTIVE | Noted: 2025-07-14

## 2025-07-15 ENCOUNTER — LAB (OUTPATIENT)
Dept: LAB | Age: 85
End: 2025-07-15

## 2025-07-15 DIAGNOSIS — R73.01 IFG (IMPAIRED FASTING GLUCOSE): ICD-10-CM

## 2025-07-15 DIAGNOSIS — E78.5 HYPERLIPIDEMIA, UNSPECIFIED HYPERLIPIDEMIA TYPE: ICD-10-CM

## 2025-07-15 DIAGNOSIS — I10 ESSENTIAL HYPERTENSION: ICD-10-CM

## 2025-07-15 DIAGNOSIS — Z13.29 SCREENING FOR THYROID DISORDER: ICD-10-CM

## 2025-07-15 LAB
ALBUMIN SERPL BCG-MCNC: 4 G/DL (ref 3.4–4.9)
ALP SERPL-CCNC: 88 U/L (ref 40–129)
ALT SERPL W/O P-5'-P-CCNC: 31 U/L (ref 10–50)
ANION GAP SERPL CALC-SCNC: 10 MEQ/L (ref 8–16)
AST SERPL-CCNC: 29 U/L (ref 10–50)
BASOPHILS ABSOLUTE: 0.1 THOU/MM3 (ref 0–0.1)
BASOPHILS NFR BLD AUTO: 1.5 %
BILIRUB SERPL-MCNC: 1 MG/DL (ref 0.3–1.2)
BUN SERPL-MCNC: 20 MG/DL (ref 8–23)
CALCIUM SERPL-MCNC: 10.1 MG/DL (ref 8.8–10.2)
CHLORIDE SERPL-SCNC: 103 MEQ/L (ref 98–111)
CHOLEST SERPL-MCNC: 177 MG/DL (ref 100–199)
CO2 SERPL-SCNC: 25 MEQ/L (ref 22–29)
CREAT SERPL-MCNC: 0.8 MG/DL (ref 0.7–1.2)
CREAT UR-MCNC: 117 MG/DL
DEPRECATED MEAN GLUCOSE BLD GHB EST-ACNC: 114 MG/DL (ref 70–126)
DEPRECATED RDW RBC AUTO: 45.1 FL (ref 35–45)
EOSINOPHIL NFR BLD AUTO: 2.1 %
EOSINOPHILS ABSOLUTE: 0.1 THOU/MM3 (ref 0–0.4)
ERYTHROCYTE [DISTWIDTH] IN BLOOD BY AUTOMATED COUNT: 12.7 % (ref 11.5–14.5)
GFR SERPL CREATININE-BSD FRML MDRD: 88 ML/MIN/1.73M2
GLUCOSE FASTING: 107 MG/DL (ref 74–109)
HBA1C MFR BLD HPLC: 5.8 % (ref 4–6)
HCT VFR BLD AUTO: 48.8 % (ref 42–52)
HDLC SERPL-MCNC: 30 MG/DL
HGB BLD-MCNC: 16.6 GM/DL (ref 14–18)
IMM GRANULOCYTES # BLD AUTO: 0.01 THOU/MM3 (ref 0–0.07)
IMM GRANULOCYTES NFR BLD AUTO: 0.2 %
LDLC SERPL CALC-MCNC: 103 MG/DL
LYMPHOCYTES ABSOLUTE: 1.5 THOU/MM3 (ref 1–4.8)
LYMPHOCYTES NFR BLD AUTO: 32.2 %
MCH RBC QN AUTO: 33.1 PG (ref 26–33)
MCHC RBC AUTO-ENTMCNC: 34 GM/DL (ref 32.2–35.5)
MCV RBC AUTO: 97.2 FL (ref 80–94)
MICROALBUMIN UR-MCNC: < 2 MG/DL
MICROALBUMIN/CREAT RATIO PNL UR: 17 MG/G (ref 0–30)
MONOCYTES ABSOLUTE: 0.6 THOU/MM3 (ref 0.4–1.3)
MONOCYTES NFR BLD AUTO: 13.4 %
NEUTROPHILS ABSOLUTE: 2.4 THOU/MM3 (ref 1.8–7.7)
NEUTROPHILS NFR BLD AUTO: 50.6 %
NRBC BLD AUTO-RTO: 0 /100 WBC
PLATELET # BLD AUTO: 198 THOU/MM3 (ref 130–400)
PMV BLD AUTO: 10.5 FL (ref 9.4–12.4)
POTASSIUM SERPL-SCNC: 4.1 MEQ/L (ref 3.5–5.2)
PROT SERPL-MCNC: 7 G/DL (ref 6.4–8.3)
RBC # BLD AUTO: 5.02 MILL/MM3 (ref 4.7–6.1)
SODIUM SERPL-SCNC: 138 MEQ/L (ref 135–145)
TRIGL SERPL-MCNC: 218 MG/DL (ref 0–199)
TSH SERPL DL<=0.05 MIU/L-ACNC: 1.97 UIU/ML (ref 0.27–4.2)
WBC # BLD AUTO: 4.8 THOU/MM3 (ref 4.8–10.8)

## 2025-07-23 RX ORDER — CLOPIDOGREL BISULFATE 75 MG/1
75 TABLET ORAL DAILY
Qty: 90 TABLET | Refills: 2 | Status: SHIPPED | OUTPATIENT
Start: 2025-07-23

## 2025-07-31 ENCOUNTER — OFFICE VISIT (OUTPATIENT)
Dept: FAMILY MEDICINE CLINIC | Age: 85
End: 2025-07-31
Payer: MEDICARE

## 2025-07-31 VITALS
RESPIRATION RATE: 14 BRPM | HEIGHT: 73 IN | HEART RATE: 77 BPM | SYSTOLIC BLOOD PRESSURE: 136 MMHG | BODY MASS INDEX: 30 KG/M2 | OXYGEN SATURATION: 93 % | DIASTOLIC BLOOD PRESSURE: 74 MMHG | WEIGHT: 226.4 LBS

## 2025-07-31 DIAGNOSIS — I10 ESSENTIAL HYPERTENSION: ICD-10-CM

## 2025-07-31 DIAGNOSIS — N40.1 BPH WITH OBSTRUCTION/LOWER URINARY TRACT SYMPTOMS: ICD-10-CM

## 2025-07-31 DIAGNOSIS — N13.8 BPH WITH OBSTRUCTION/LOWER URINARY TRACT SYMPTOMS: ICD-10-CM

## 2025-07-31 DIAGNOSIS — I25.10 CORONARY ARTERY DISEASE INVOLVING NATIVE HEART WITHOUT ANGINA PECTORIS, UNSPECIFIED VESSEL OR LESION TYPE: ICD-10-CM

## 2025-07-31 DIAGNOSIS — C61 PROSTATE CANCER (HCC): ICD-10-CM

## 2025-07-31 DIAGNOSIS — N52.9 ERECTILE DYSFUNCTION, UNSPECIFIED ERECTILE DYSFUNCTION TYPE: ICD-10-CM

## 2025-07-31 DIAGNOSIS — I71.40 ABDOMINAL AORTIC ANEURYSM (AAA) WITHOUT RUPTURE, UNSPECIFIED PART: ICD-10-CM

## 2025-07-31 DIAGNOSIS — I72.3 ILIAC ARTERY ANEURYSM: ICD-10-CM

## 2025-07-31 DIAGNOSIS — Z00.00 MEDICARE ANNUAL WELLNESS VISIT, SUBSEQUENT: Primary | ICD-10-CM

## 2025-07-31 DIAGNOSIS — E78.5 HYPERLIPIDEMIA, UNSPECIFIED HYPERLIPIDEMIA TYPE: ICD-10-CM

## 2025-07-31 DIAGNOSIS — F32.A DEPRESSION, UNSPECIFIED DEPRESSION TYPE: ICD-10-CM

## 2025-07-31 DIAGNOSIS — H35.30 MACULAR DEGENERATION, UNSPECIFIED LATERALITY, UNSPECIFIED TYPE: ICD-10-CM

## 2025-07-31 DIAGNOSIS — R73.01 IFG (IMPAIRED FASTING GLUCOSE): ICD-10-CM

## 2025-07-31 DIAGNOSIS — M62.830 BACK SPASM: ICD-10-CM

## 2025-07-31 PROCEDURE — G0439 PPPS, SUBSEQ VISIT: HCPCS | Performed by: FAMILY MEDICINE

## 2025-07-31 PROCEDURE — 1160F RVW MEDS BY RX/DR IN RCRD: CPT | Performed by: FAMILY MEDICINE

## 2025-07-31 PROCEDURE — 3078F DIAST BP <80 MM HG: CPT | Performed by: FAMILY MEDICINE

## 2025-07-31 PROCEDURE — 3075F SYST BP GE 130 - 139MM HG: CPT | Performed by: FAMILY MEDICINE

## 2025-07-31 PROCEDURE — 1159F MED LIST DOCD IN RCRD: CPT | Performed by: FAMILY MEDICINE

## 2025-07-31 PROCEDURE — 1123F ACP DISCUSS/DSCN MKR DOCD: CPT | Performed by: FAMILY MEDICINE

## 2025-07-31 RX ORDER — CYCLOBENZAPRINE HCL 10 MG
TABLET ORAL
Qty: 30 TABLET | Refills: 0 | Status: SHIPPED | OUTPATIENT
Start: 2025-07-31

## 2025-07-31 SDOH — ECONOMIC STABILITY: FOOD INSECURITY: WITHIN THE PAST 12 MONTHS, THE FOOD YOU BOUGHT JUST DIDN'T LAST AND YOU DIDN'T HAVE MONEY TO GET MORE.: NEVER TRUE

## 2025-07-31 SDOH — ECONOMIC STABILITY: FOOD INSECURITY: WITHIN THE PAST 12 MONTHS, YOU WORRIED THAT YOUR FOOD WOULD RUN OUT BEFORE YOU GOT MONEY TO BUY MORE.: NEVER TRUE

## 2025-07-31 ASSESSMENT — PATIENT HEALTH QUESTIONNAIRE - PHQ9
5. POOR APPETITE OR OVEREATING: NOT AT ALL
10. IF YOU CHECKED OFF ANY PROBLEMS, HOW DIFFICULT HAVE THESE PROBLEMS MADE IT FOR YOU TO DO YOUR WORK, TAKE CARE OF THINGS AT HOME, OR GET ALONG WITH OTHER PEOPLE: NOT DIFFICULT AT ALL
3. TROUBLE FALLING OR STAYING ASLEEP: NOT AT ALL
8. MOVING OR SPEAKING SO SLOWLY THAT OTHER PEOPLE COULD HAVE NOTICED. OR THE OPPOSITE, BEING SO FIGETY OR RESTLESS THAT YOU HAVE BEEN MOVING AROUND A LOT MORE THAN USUAL: NOT AT ALL
SUM OF ALL RESPONSES TO PHQ QUESTIONS 1-9: 0
SUM OF ALL RESPONSES TO PHQ QUESTIONS 1-9: 0
2. FEELING DOWN, DEPRESSED OR HOPELESS: NOT AT ALL
7. TROUBLE CONCENTRATING ON THINGS, SUCH AS READING THE NEWSPAPER OR WATCHING TELEVISION: NOT AT ALL
1. LITTLE INTEREST OR PLEASURE IN DOING THINGS: NOT AT ALL
SUM OF ALL RESPONSES TO PHQ QUESTIONS 1-9: 0
4. FEELING TIRED OR HAVING LITTLE ENERGY: NOT AT ALL
6. FEELING BAD ABOUT YOURSELF - OR THAT YOU ARE A FAILURE OR HAVE LET YOURSELF OR YOUR FAMILY DOWN: NOT AT ALL
9. THOUGHTS THAT YOU WOULD BE BETTER OFF DEAD, OR OF HURTING YOURSELF: NOT AT ALL
SUM OF ALL RESPONSES TO PHQ QUESTIONS 1-9: 0

## 2025-07-31 ASSESSMENT — ENCOUNTER SYMPTOMS
CHEST TIGHTNESS: 0
DIARRHEA: 0
ABDOMINAL PAIN: 0
ANAL BLEEDING: 0
CONSTIPATION: 0
BLOOD IN STOOL: 0
VOMITING: 0
NAUSEA: 0
SHORTNESS OF BREATH: 0

## 2025-07-31 NOTE — PROGRESS NOTES
Medicare Annual Wellness Visit    Ky Londono is here for Medicare AWV (Patient has no concerns or complaints.)    Assessment & Plan   Medicare annual wellness visit, subsequent  IFG (impaired fasting glucose)  -     Hemoglobin A1C; Future  Essential hypertension  Hyperlipidemia, unspecified hyperlipidemia type  -     Lipid Panel; Future  Coronary artery disease involving native heart without angina pectoris, unspecified vessel or lesion type  Depression, unspecified depression type  Erectile dysfunction, unspecified erectile dysfunction type  BPH with obstruction/lower urinary tract symptoms  Macular degeneration, unspecified laterality, unspecified type  Iliac artery aneurysm  -     CTA ABDOMINAL AORTA W BILAT RUNOFF W WO CONTRAST; Future  Abdominal aortic aneurysm (AAA) without rupture, unspecified part  -     CTA ABDOMINAL AORTA W BILAT RUNOFF W WO CONTRAST; Future  Prostate cancer (HCC)  Back spasm  -     cyclobenzaprine (FLEXERIL) 10 MG tablet; TAKE 1/2 TO 1 (ONE-HALF TO ONE) TABLET BY MOUTH THREE TIMES DAILY AS NEEDED FOR MUSCLE SPASM, Disp-30 tablet, R-0Normal       Return in about 6 months (around 1/31/2026).     Subjective     Patient's complete Health Risk Assessment and screening values have been reviewed and are found in Flowsheets. The following problems were reviewed today and where indicated follow up appointments were made and/or referrals ordered.    Positive Risk Factor Screenings with Interventions:    Fall Risk:  Do you feel unsteady or are you worried about falling? : (!) yes  2 or more falls in past year?: no  Fall with injury in past year?: no  Interventions:    Reviewed medications, home hazards, visual acuity, and co-morbidities that can increase risk for falls  When bending over only, otherwise no- \"I'm very aware\"- no work-up desired.                                 Objective   Vitals:    07/31/25 0955   BP: 136/74   BP Site: Left Upper Arm   Patient Position: Sitting   BP Cuff 
increased in size.  3. Median arcuate ligament deforming Celiac artery.     CT Lung Screen (50-80, 20 pk-yrs, smoking or quit <15 years) indicated at this time?  No, quit in 1967  Sleep Medicine referral indicated at this time (Obesity, Snoring, Daytime Somnolence, Apneic Episodes)?  Patient denies any current symptoms      Future Appointments   Date Time Provider Department Center   9/15/2025  8:15 AM Coreen Mathews PA-C [facility]   9/25/2025 11:45 AM Gayle Palencia MD [facility]       ASSESSMENT       Diagnosis Orders   1. Medicare annual wellness visit, subsequent        2. IFG (impaired fasting glucose)  Hemoglobin A1C      3. Essential hypertension        4. Hyperlipidemia, unspecified hyperlipidemia type  Lipid Panel      5. Coronary artery disease involving native heart without angina pectoris, unspecified vessel or lesion type        6. Depression, unspecified depression type        7. Erectile dysfunction, unspecified erectile dysfunction type        8. BPH with obstruction/lower urinary tract symptoms        9. Macular degeneration, unspecified laterality, unspecified type        10. Iliac artery aneurysm  CTA ABDOMINAL AORTA W BILAT RUNOFF W WO CONTRAST      11. Abdominal aortic aneurysm (AAA) without rupture, unspecified part  CTA ABDOMINAL AORTA W BILAT RUNOFF W WO CONTRAST      12. Prostate cancer (HCC)        13. Back spasm  cyclobenzaprine (FLEXERIL) 10 MG tablet          PLAN      Check CTA with runoff due to history of known iliac/aortic aneurysms  Increase Fish OIl to 1000 mg- 2 pills 2x/day at this time to help triglycerides.   Please check with urology regarding stopping Ditropan due to reports of memory loss associated with this medication-may want to consider Myrbetriq (mirabegron) if okay with their office  Check HGA1c and FLP in 6 months.   Home BP's- Call if > 140/90 on a regular basis  Continue max dose Lipitor at this time  Continue current medicines

## 2025-07-31 NOTE — PATIENT INSTRUCTIONS
Check CTA with runoff due to history of known iliac/aortic aneurysms  Increase Fish OIl to 1000 mg- 2 pills 2x/day at this time to help triglycerides.   Please check with urology regarding stopping Ditropan due to reports of memory loss associated with this medication-may want to consider Myrbetriq (mirabegron) if okay with their office  Check HGA1c and FLP in 6 months.   Home BP's- Call if > 140/90 on a regular basis  Continue max dose Lipitor at this time  Continue current medicines   Refill cyclobenzaprine    Follow up in 6 months on Thursday afternoon with Dr. Rivera or Dr. Gonzales  Follow-up in 12 months with me            Preventive Health Topics (updated 7/31/2025):  Encouraged COVID VACCINE booster  Encouraged annual FLU VACCINE  October 1st - March 1st annually.   Encouraged RSV vaccine- pt to check with local preferred pharmacy.  COLONOSCOPY done 1/10/2024 per Dr. Carter-no follow-up colonoscopy indicated.   JOSEFA and PSA management [facility]/ Coreen Mathews- to follow-up 9/15/2025       Preventing Falls: Care Instructions  Injuries and health problems such as trouble walking or poor eyesight can increase your risk of falling. So can some medicines. But there are things you can do to help prevent falls. You can exercise to get stronger. You can also arrange your home to make it safer.    Talk to your doctor about the medicines you take. Ask if any of them increase the risk of falls and whether they can be changed or stopped.   Try to exercise regularly. It can help improve your strength and balance. This can help lower your risk of falling.         Practice fall safety and prevention.   Wear low-heeled shoes that fit well and give your feet good support. Talk to your doctor if you have foot problems that make this hard.  Carry a cellphone or wear a medical alert device that you can use to call for help.  Use stepladders instead of chairs to reach high objects. Don't climb if you're at risk for falls.

## 2025-09-04 ENCOUNTER — HOSPITAL ENCOUNTER (OUTPATIENT)
Dept: CT IMAGING | Age: 85
Discharge: HOME | End: 2025-09-04
Attending: FAMILY MEDICINE
Payer: MEDICARE

## 2025-09-04 DIAGNOSIS — I72.3 ILIAC ARTERY ANEURYSM: ICD-10-CM

## 2025-09-04 DIAGNOSIS — I71.40 ABDOMINAL AORTIC ANEURYSM (AAA) WITHOUT RUPTURE, UNSPECIFIED PART: ICD-10-CM

## 2025-09-04 LAB — POC CREATININE WHOLE BLOOD: 0.8 MG/DL (ref 0.5–1.2)

## 2025-09-04 PROCEDURE — 75635 CT ANGIO ABDOMINAL ARTERIES: CPT

## 2025-09-04 PROCEDURE — 6360000004 HC RX CONTRAST MEDICATION: Performed by: FAMILY MEDICINE

## 2025-09-04 PROCEDURE — 82565 ASSAY OF CREATININE: CPT

## 2025-09-04 RX ORDER — IOPAMIDOL 755 MG/ML
125 INJECTION, SOLUTION INTRAVASCULAR
Status: COMPLETED | OUTPATIENT
Start: 2025-09-04 | End: 2025-09-04

## 2025-09-04 RX ADMIN — IOPAMIDOL 125 ML: 755 INJECTION, SOLUTION INTRAVENOUS at 07:19
